# Patient Record
Sex: FEMALE | Race: BLACK OR AFRICAN AMERICAN | NOT HISPANIC OR LATINO | Employment: UNEMPLOYED | URBAN - METROPOLITAN AREA
[De-identification: names, ages, dates, MRNs, and addresses within clinical notes are randomized per-mention and may not be internally consistent; named-entity substitution may affect disease eponyms.]

---

## 2017-02-09 ENCOUNTER — HISTORICAL (OUTPATIENT)
Dept: PHYSICAL THERAPY | Facility: HOSPITAL | Age: 57
End: 2017-02-09

## 2017-02-23 ENCOUNTER — HISTORICAL (OUTPATIENT)
Dept: PHYSICAL THERAPY | Facility: HOSPITAL | Age: 57
End: 2017-02-23

## 2017-02-27 ENCOUNTER — HISTORICAL (OUTPATIENT)
Dept: PHYSICAL THERAPY | Facility: HOSPITAL | Age: 57
End: 2017-02-27

## 2017-03-02 ENCOUNTER — HISTORICAL (OUTPATIENT)
Dept: PHYSICAL THERAPY | Facility: HOSPITAL | Age: 57
End: 2017-03-02

## 2017-03-06 ENCOUNTER — HISTORICAL (OUTPATIENT)
Dept: PHYSICAL THERAPY | Facility: HOSPITAL | Age: 57
End: 2017-03-06

## 2017-03-08 ENCOUNTER — HISTORICAL (OUTPATIENT)
Dept: PHYSICAL THERAPY | Facility: HOSPITAL | Age: 57
End: 2017-03-08

## 2017-09-19 ENCOUNTER — HISTORICAL (OUTPATIENT)
Dept: WOUND CARE | Facility: HOSPITAL | Age: 57
End: 2017-09-19

## 2020-02-21 ENCOUNTER — HISTORICAL (OUTPATIENT)
Dept: RADIOLOGY | Facility: HOSPITAL | Age: 60
End: 2020-02-21

## 2020-09-11 ENCOUNTER — HOSPITAL ENCOUNTER (INPATIENT)
Facility: HOSPITAL | Age: 60
LOS: 13 days | Discharge: NURSING, REHAB OR LONG-TERM CARE | DRG: 100 | End: 2020-09-24
Attending: EMERGENCY MEDICINE | Admitting: HOSPITALIST
Payer: MEDICAID

## 2020-09-11 DIAGNOSIS — E87.6 HYPOKALEMIA: ICD-10-CM

## 2020-09-11 DIAGNOSIS — F33.3 SEVERE EPISODE OF RECURRENT MAJOR DEPRESSIVE DISORDER, WITH PSYCHOTIC FEATURES: ICD-10-CM

## 2020-09-11 DIAGNOSIS — I63.00 CEREBROVASCULAR ACCIDENT (CVA) DUE TO THROMBOSIS OF PRECEREBRAL ARTERY: ICD-10-CM

## 2020-09-11 DIAGNOSIS — R29.90 NEW ONSET SEIZURE WITH ABNORMAL NEUROLOGICAL EXAM WITHOUT HEAD TRAUMA: Primary | ICD-10-CM

## 2020-09-11 DIAGNOSIS — R90.89 ABNORMAL CT OF BRAIN: ICD-10-CM

## 2020-09-11 DIAGNOSIS — M50.90 CERVICAL DISC DISEASE: ICD-10-CM

## 2020-09-11 DIAGNOSIS — Z74.09 IMPAIRED MOBILITY AND ADLS: ICD-10-CM

## 2020-09-11 DIAGNOSIS — Z78.9 IMPAIRED MOBILITY AND ADLS: ICD-10-CM

## 2020-09-11 DIAGNOSIS — R44.3 HALLUCINATIONS: ICD-10-CM

## 2020-09-11 DIAGNOSIS — G40.901 STATUS EPILEPTICUS: ICD-10-CM

## 2020-09-11 DIAGNOSIS — R56.9 NEW ONSET SEIZURE: ICD-10-CM

## 2020-09-11 DIAGNOSIS — F32.89 OTHER DEPRESSION: ICD-10-CM

## 2020-09-11 DIAGNOSIS — M54.31 BILATERAL SCIATICA: ICD-10-CM

## 2020-09-11 DIAGNOSIS — M54.32 BILATERAL SCIATICA: ICD-10-CM

## 2020-09-11 DIAGNOSIS — G04.90 ENCEPHALITIS: ICD-10-CM

## 2020-09-11 DIAGNOSIS — T42.4X5A: ICD-10-CM

## 2020-09-11 DIAGNOSIS — R41.82 ACUTE ALTERATION IN MENTAL STATUS: ICD-10-CM

## 2020-09-11 DIAGNOSIS — G40.909 SEIZURE DISORDER: ICD-10-CM

## 2020-09-11 DIAGNOSIS — I63.9 STROKE: ICD-10-CM

## 2020-09-11 DIAGNOSIS — Z01.89 ENCOUNTER FOR IMAGING TO SCREEN FOR METAL PRIOR TO MRI: ICD-10-CM

## 2020-09-11 DIAGNOSIS — R56.9 NEW ONSET SEIZURE WITH ABNORMAL NEUROLOGICAL EXAM WITHOUT HEAD TRAUMA: Primary | ICD-10-CM

## 2020-09-11 LAB
ALBUMIN SERPL BCP-MCNC: 3.8 G/DL (ref 3.5–5.2)
ALP SERPL-CCNC: 88 U/L (ref 55–135)
ALT SERPL W/O P-5'-P-CCNC: 25 U/L (ref 10–44)
AMPHET+METHAMPHET UR QL: NEGATIVE
ANION GAP SERPL CALC-SCNC: 13 MMOL/L (ref 8–16)
ANION GAP SERPL CALC-SCNC: 14 MMOL/L (ref 8–16)
AST SERPL-CCNC: 25 U/L (ref 10–40)
BARBITURATES UR QL SCN>200 NG/ML: NEGATIVE
BASOPHILS # BLD AUTO: 0.05 K/UL (ref 0–0.2)
BASOPHILS NFR BLD: 0.5 % (ref 0–1.9)
BENZODIAZ UR QL SCN>200 NG/ML: NORMAL
BILIRUB SERPL-MCNC: 0.3 MG/DL (ref 0.1–1)
BILIRUB UR QL STRIP: NEGATIVE
BUN SERPL-MCNC: 12 MG/DL (ref 6–20)
BUN SERPL-MCNC: 14 MG/DL (ref 6–20)
BZE UR QL SCN: NEGATIVE
CALCIUM SERPL-MCNC: 9 MG/DL (ref 8.7–10.5)
CALCIUM SERPL-MCNC: 9.3 MG/DL (ref 8.7–10.5)
CANNABINOIDS UR QL SCN: NEGATIVE
CHLORIDE SERPL-SCNC: 98 MMOL/L (ref 95–110)
CHLORIDE SERPL-SCNC: 98 MMOL/L (ref 95–110)
CLARITY UR: CLEAR
CO2 SERPL-SCNC: 25 MMOL/L (ref 23–29)
CO2 SERPL-SCNC: 27 MMOL/L (ref 23–29)
COLOR UR: YELLOW
CREAT SERPL-MCNC: 0.7 MG/DL (ref 0.5–1.4)
CREAT SERPL-MCNC: 0.7 MG/DL (ref 0.5–1.4)
CREAT UR-MCNC: 64.7 MG/DL (ref 15–325)
CTP QC/QA: YES
DIFFERENTIAL METHOD: ABNORMAL
EOSINOPHIL # BLD AUTO: 0 K/UL (ref 0–0.5)
EOSINOPHIL NFR BLD: 0.3 % (ref 0–8)
ERYTHROCYTE [DISTWIDTH] IN BLOOD BY AUTOMATED COUNT: 14.3 % (ref 11.5–14.5)
EST. GFR  (AFRICAN AMERICAN): >60 ML/MIN/1.73 M^2
EST. GFR  (AFRICAN AMERICAN): >60 ML/MIN/1.73 M^2
EST. GFR  (NON AFRICAN AMERICAN): >60 ML/MIN/1.73 M^2
EST. GFR  (NON AFRICAN AMERICAN): >60 ML/MIN/1.73 M^2
ETHANOL UR-MCNC: <10 MG/DL
GLUCOSE SERPL-MCNC: 105 MG/DL (ref 70–110)
GLUCOSE SERPL-MCNC: 123 MG/DL (ref 70–110)
GLUCOSE UR QL STRIP: NEGATIVE
HCT VFR BLD AUTO: 34.8 % (ref 37–48.5)
HGB BLD-MCNC: 11.6 G/DL (ref 12–16)
HGB UR QL STRIP: NEGATIVE
IMM GRANULOCYTES # BLD AUTO: 0.04 K/UL (ref 0–0.04)
IMM GRANULOCYTES NFR BLD AUTO: 0.4 % (ref 0–0.5)
KETONES UR QL STRIP: NEGATIVE
LEUKOCYTE ESTERASE UR QL STRIP: NEGATIVE
LYMPHOCYTES # BLD AUTO: 1 K/UL (ref 1–4.8)
LYMPHOCYTES NFR BLD: 9.6 % (ref 18–48)
MAGNESIUM SERPL-MCNC: 1.9 MG/DL (ref 1.6–2.6)
MCH RBC QN AUTO: 29.6 PG (ref 27–31)
MCHC RBC AUTO-ENTMCNC: 33.3 G/DL (ref 32–36)
MCV RBC AUTO: 89 FL (ref 82–98)
METHADONE UR QL SCN>300 NG/ML: NEGATIVE
MONOCYTES # BLD AUTO: 0.5 K/UL (ref 0.3–1)
MONOCYTES NFR BLD: 4.9 % (ref 4–15)
NEUTROPHILS # BLD AUTO: 8.6 K/UL (ref 1.8–7.7)
NEUTROPHILS NFR BLD: 84.3 % (ref 38–73)
NITRITE UR QL STRIP: NEGATIVE
NRBC BLD-RTO: 0 /100 WBC
OPIATES UR QL SCN: NEGATIVE
PCP UR QL SCN>25 NG/ML: NEGATIVE
PH UR STRIP: 6 [PH] (ref 5–8)
PHOSPHATE SERPL-MCNC: 2.8 MG/DL (ref 2.7–4.5)
PLATELET # BLD AUTO: 328 K/UL (ref 150–350)
PMV BLD AUTO: 9.6 FL (ref 9.2–12.9)
POCT GLUCOSE: 127 MG/DL (ref 70–110)
POTASSIUM SERPL-SCNC: 2.9 MMOL/L (ref 3.5–5.1)
POTASSIUM SERPL-SCNC: 3.1 MMOL/L (ref 3.5–5.1)
PROT SERPL-MCNC: 6.7 G/DL (ref 6–8.4)
PROT UR QL STRIP: NEGATIVE
RBC # BLD AUTO: 3.92 M/UL (ref 4–5.4)
SARS-COV-2 RDRP RESP QL NAA+PROBE: NEGATIVE
SODIUM SERPL-SCNC: 137 MMOL/L (ref 136–145)
SODIUM SERPL-SCNC: 138 MMOL/L (ref 136–145)
SP GR UR STRIP: 1.01 (ref 1–1.03)
TOXICOLOGY INFORMATION: NORMAL
URN SPEC COLLECT METH UR: NORMAL
UROBILINOGEN UR STRIP-ACNC: NEGATIVE EU/DL
WBC # BLD AUTO: 10.23 K/UL (ref 3.9–12.7)

## 2020-09-11 PROCEDURE — 25500020 PHARM REV CODE 255: Performed by: HOSPITALIST

## 2020-09-11 PROCEDURE — U0002 COVID-19 LAB TEST NON-CDC: HCPCS | Performed by: EMERGENCY MEDICINE

## 2020-09-11 PROCEDURE — 25000003 PHARM REV CODE 250: Performed by: NURSE PRACTITIONER

## 2020-09-11 PROCEDURE — 63600175 PHARM REV CODE 636 W HCPCS: Performed by: NURSE PRACTITIONER

## 2020-09-11 PROCEDURE — 63600175 PHARM REV CODE 636 W HCPCS: Performed by: EMERGENCY MEDICINE

## 2020-09-11 PROCEDURE — 80048 BASIC METABOLIC PNL TOTAL CA: CPT

## 2020-09-11 PROCEDURE — 99223 1ST HOSP IP/OBS HIGH 75: CPT | Mod: ,,, | Performed by: NURSE PRACTITIONER

## 2020-09-11 PROCEDURE — 12000002 HC ACUTE/MED SURGE SEMI-PRIVATE ROOM

## 2020-09-11 PROCEDURE — A9585 GADOBUTROL INJECTION: HCPCS | Performed by: HOSPITALIST

## 2020-09-11 PROCEDURE — 83735 ASSAY OF MAGNESIUM: CPT

## 2020-09-11 PROCEDURE — 80053 COMPREHEN METABOLIC PANEL: CPT

## 2020-09-11 PROCEDURE — 99223 PR INITIAL HOSPITAL CARE,LEVL III: ICD-10-PCS | Mod: ,,, | Performed by: NURSE PRACTITIONER

## 2020-09-11 PROCEDURE — 80307 DRUG TEST PRSMV CHEM ANLYZR: CPT

## 2020-09-11 PROCEDURE — 81003 URINALYSIS AUTO W/O SCOPE: CPT

## 2020-09-11 PROCEDURE — 85025 COMPLETE CBC W/AUTO DIFF WBC: CPT

## 2020-09-11 PROCEDURE — 84100 ASSAY OF PHOSPHORUS: CPT

## 2020-09-11 RX ORDER — MULTIVITAMIN
1 TABLET ORAL DAILY
Status: ON HOLD | COMMUNITY
End: 2020-09-21 | Stop reason: CLARIF

## 2020-09-11 RX ORDER — DOCUSATE SODIUM 100 MG/1
100 CAPSULE, LIQUID FILLED ORAL 2 TIMES DAILY
Status: DISCONTINUED | OUTPATIENT
Start: 2020-09-11 | End: 2020-09-15

## 2020-09-11 RX ORDER — LEVETIRACETAM 10 MG/ML
1000 INJECTION INTRAVASCULAR ONCE
Status: COMPLETED | OUTPATIENT
Start: 2020-09-11 | End: 2020-09-11

## 2020-09-11 RX ORDER — LEVETIRACETAM 100 MG/ML
1000 SOLUTION ORAL ONCE
Status: DISCONTINUED | OUTPATIENT
Start: 2020-09-11 | End: 2020-09-11

## 2020-09-11 RX ORDER — SERTRALINE HYDROCHLORIDE 25 MG/1
25 TABLET, FILM COATED ORAL DAILY
Status: ON HOLD | COMMUNITY
End: 2020-09-21 | Stop reason: CLARIF

## 2020-09-11 RX ORDER — ONDANSETRON 8 MG/1
8 TABLET, ORALLY DISINTEGRATING ORAL EVERY 8 HOURS PRN
Status: DISCONTINUED | OUTPATIENT
Start: 2020-09-11 | End: 2020-09-12

## 2020-09-11 RX ORDER — QUETIAPINE FUMARATE 25 MG/1
12.5 TABLET, FILM COATED ORAL NIGHTLY
Status: ON HOLD | COMMUNITY
End: 2020-09-21 | Stop reason: DRUGHIGH

## 2020-09-11 RX ORDER — GADOBUTROL 604.72 MG/ML
7 INJECTION INTRAVENOUS
Status: COMPLETED | OUTPATIENT
Start: 2020-09-11 | End: 2020-09-11

## 2020-09-11 RX ORDER — LEVETIRACETAM 500 MG/1
1000 TABLET ORAL ONCE
Status: DISCONTINUED | OUTPATIENT
Start: 2020-09-11 | End: 2020-09-11

## 2020-09-11 RX ORDER — DIAZEPAM 10 MG/2ML
0.1 INJECTION INTRAMUSCULAR ONCE
Status: DISCONTINUED | OUTPATIENT
Start: 2020-09-11 | End: 2020-09-11

## 2020-09-11 RX ORDER — DIAZEPAM 10 MG/2ML
0.1 INJECTION INTRAMUSCULAR EVERY 8 HOURS PRN
Status: DISCONTINUED | OUTPATIENT
Start: 2020-09-11 | End: 2020-09-11

## 2020-09-11 RX ORDER — POTASSIUM CHLORIDE 7.45 MG/ML
10 INJECTION INTRAVENOUS
Status: COMPLETED | OUTPATIENT
Start: 2020-09-11 | End: 2020-09-11

## 2020-09-11 RX ORDER — SODIUM CHLORIDE 0.9 % (FLUSH) 0.9 %
10 SYRINGE (ML) INJECTION
Status: DISCONTINUED | OUTPATIENT
Start: 2020-09-11 | End: 2020-09-24 | Stop reason: HOSPADM

## 2020-09-11 RX ORDER — LEVETIRACETAM 10 MG/ML
1000 INJECTION INTRAVASCULAR EVERY 12 HOURS
Status: DISCONTINUED | OUTPATIENT
Start: 2020-09-12 | End: 2020-09-12

## 2020-09-11 RX ORDER — SERTRALINE HYDROCHLORIDE 25 MG/1
25 TABLET, FILM COATED ORAL DAILY
Status: DISCONTINUED | OUTPATIENT
Start: 2020-09-12 | End: 2020-09-15

## 2020-09-11 RX ORDER — LEVETIRACETAM 100 MG/ML
1000 SOLUTION ORAL 2 TIMES DAILY
Status: DISCONTINUED | OUTPATIENT
Start: 2020-09-11 | End: 2020-09-11

## 2020-09-11 RX ORDER — ONDANSETRON 2 MG/ML
4 INJECTION INTRAMUSCULAR; INTRAVENOUS EVERY 8 HOURS PRN
Status: DISCONTINUED | OUTPATIENT
Start: 2020-09-11 | End: 2020-09-11

## 2020-09-11 RX ORDER — GABAPENTIN 100 MG/1
100 CAPSULE ORAL 3 TIMES DAILY
Status: ON HOLD | COMMUNITY
End: 2020-09-21 | Stop reason: CLARIF

## 2020-09-11 RX ORDER — ACETAMINOPHEN 325 MG/1
650 TABLET ORAL EVERY 4 HOURS PRN
Status: DISCONTINUED | OUTPATIENT
Start: 2020-09-11 | End: 2020-09-16

## 2020-09-11 RX ORDER — SODIUM CHLORIDE 0.9 % (FLUSH) 0.9 %
10 SYRINGE (ML) INJECTION
Status: DISCONTINUED | OUTPATIENT
Start: 2020-09-11 | End: 2020-09-11

## 2020-09-11 RX ADMIN — POTASSIUM CHLORIDE 10 MEQ: 7.46 INJECTION, SOLUTION INTRAVENOUS at 01:09

## 2020-09-11 RX ADMIN — GADOBUTROL 7 ML: 604.72 INJECTION INTRAVENOUS at 03:09

## 2020-09-11 RX ADMIN — DEXTROSE 3000 MG: 50 INJECTION, SOLUTION INTRAVENOUS at 07:09

## 2020-09-11 RX ADMIN — LEVETIRACETAM INJECTION 1000 MG: 10 INJECTION INTRAVENOUS at 06:09

## 2020-09-11 NOTE — ED NOTES
Pt sleeping in bed, calm and cooperative but easily arousble. Pt has 2L NC, even/nonlabored breathing noted. Pt remains in paper scrubs, on continued cardiac monitor, BP and pulse ox. Cecile taylor at bedside with direct observation. Seizure precautions maintained. Will continue to monitor pt.

## 2020-09-11 NOTE — ED NOTES
Report received from REYNALDO Bahena.  Pt resting calmly in bed.  NAD noted.  Respirations even and unlabored.  Pt in facility provided scrubs.  Bear Huber, in direct observation of patient documenting q 15 minute checks.  Pt opens eyes to voice but does not verbally respond.

## 2020-09-11 NOTE — CONSULTS
NEUROLOGY CONSULT NOTE  [facility]    Patient Name:  Donna Arias  Date of Consult: 2020  Admit Date:    MR #:  [id number]  Acct #:  [id number]  :  1960    Physicians:  Primary Doctor No (Family); Kerry Tamayo MD  Consulting Physician:  Patria Evans MD       Chief Complaint/Reason for Consult: Seizure like activity      Assessment and Plan:  Donna Arias is a 60 y.o. w/ a h/o anxiety and depression ( unable to confrim pst history) presents from [facility] for evaluation of recurrent seizure like activity which hs persisted since she arrived. The patient was treated with 2 mg of ativan and 10 mg of Versed. In the ER she has had continued seizure activity lasting between 10 seconds to 2 minutes happening at every 5 minute or less interval.     Head CT reveals hypoattenuation in the right frontal lobe, brain MRI with changes involving the right frontal lobe increased signal , cortical banding which can be seen with seizure activity.     On examination the patient is alert but has intermittent head versive seizures with head turn to the left, left eye deviation, increased tone involving right upper extremity, inability to speak as well.     Assessment:  1. Status epilepticus  2. Right frontal lobe increased signal- possibly 2/2 seizure activity ( stroke less likely)    Recommendations:  1. Load with Keppra  4 grams iv x 1 ( 50 mg /kg) then 1 gram iv bid  2. If no benefit would load with Vimpat 10 mg/kg -  800mg iv x1   3. Stat cEEG  4. Obtain urine analaysis, urine tox screen  5. Monitor electrolytes, maintain magnesium >2     History of Present Illness:  Donna Arias is a 60 y.o.female on whom I have been asked to consult for evaluation and treatment of recurrent seizures.     I was unable to reach the patient's brother. I spoke with the Nurse taking care of her at [facility]. Per Nurse, she received report this morning that the patient was disoriented  "overnight, she was admitted on 9/9 for evaluation of an episode of confusion. She was apparently found in the neighbour's yard in a confused state and was admitted for further evaluation of the same.    Per Nurse, when she went in this am the patient was having recurrent 1 minute episodes of eyes rolling in the back of her head, bilateral upper extremity shaking followed by stiffness involving upper extremities. She would then "come out" of an episode. She would be able to answer questions like her name but not anything other than that. This continued until EMS arrived. She was given a one time dose of Ativan at the hospital without benefit.    EMS administered ativan and 10 mg versed. Per ER physician notes the patient was drowsy and unable to answer questions on arrival. Per patient's ER nurse she has episodes lasting few seconds every 5-20 minutes. The episodes are described as left head turn, left eye deviation , increased tone of the right hand followed by shaking of the right hand. The patient is able to talk 10-20 seconds after the seizure but is often confused.         Duration: 10-60 seconds  Location: left head deviation, eye deviation to the left  Intensity: moderate to severe  Aggravating factors: none  Relieving factors: none  Frequency: very frequent seizures   Timing: since she returned from MRI  Associated symptoms: confusion        Laboratory and Additional Data Reviewed:      Tests to date: All imaging reviewed personally by me in addition to cardiology reports.  MRI Brain W WO Contrast   Final Result      Restricted diffusion and edema throughout the right frontal lobe with sulcal effacement.  Findings concerning for acute infarction in the right CESILIA territory.  No definite underlying mass lesion or abnormal postcontrast enhancement, however, there is significant motion artifact which limits sensitivity.         Electronically signed by: Bravo Novak MD   Date:    09/11/2020   Time:    16:58 "      X-Ray Chest AP Portable   Final Result      No radiographic acute intrathoracic process seen on this single view.         Electronically signed by: Casey Stone MD   Date:    09/11/2020   Time:    13:58      X-Ray Abdomen AP 1 View (KUB)   Final Result      No acute process seen.         Electronically signed by: Adan Santos MD   Date:    09/11/2020   Time:    13:56      CT Head Without Contrast   Final Result      1. Hypoattenuation involving the superior right frontal lobe with involvement of cortex and underlying white matter.  While this may represent acute or subacute ischemia, additional differential consideration given the imaging appearance and clinical history would include neoplastic, infectious, inflammatory processes.  As such, further evaluation contrast-enhanced brain MRI would be recommended for further evaluation.   2. No acute intracranial hemorrhage or midline shift.   This critical information above was relayed by myself by telephone to Dr. Hughes on 09/11/2020 at 09:50.         Electronically signed by: Jose Carlos Penn   Date:    09/11/2020   Time:    09:55      MRI BRAIN EPILEPSY WITHOUT CONTRAST    (Results Pending)       Labs: All labs reviewed by me.  Lab Results   Component Value Date    WBC 10.23 09/11/2020    HGB 11.6 (L) 09/11/2020    HCT 34.8 (L) 09/11/2020     09/11/2020    ALT 25 09/11/2020    AST 25 09/11/2020     09/11/2020    K 3.1 (L) 09/11/2020    CL 98 09/11/2020    CREATININE 0.7 09/11/2020    BUN 12 09/11/2020         History:   No past medical history on file.  No past surgical history on file.  No family history on file.  Social History     Socioeconomic History    Marital status: Single     Spouse name: Not on file    Number of children: Not on file    Years of education: Not on file    Highest education level: Not on file   Occupational History    Not on file   Social Needs    Financial resource strain: Not on file    Food insecurity     Worry:  Not on file     Inability: Not on file    Transportation needs     Medical: Not on file     Non-medical: Not on file   Tobacco Use    Smoking status: Not on file   Substance and Sexual Activity    Alcohol use: Not on file    Drug use: Not on file    Sexual activity: Not on file   Lifestyle    Physical activity     Days per week: Not on file     Minutes per session: Not on file    Stress: Not on file   Relationships    Social connections     Talks on phone: Not on file     Gets together: Not on file     Attends Sikhism service: Not on file     Active member of club or organization: Not on file     Attends meetings of clubs or organizations: Not on file     Relationship status: Not on file   Other Topics Concern    Not on file   Social History Narrative    Not on file       Allergy Information:        I have reviewed the patient's allergies.       Benadryl [diphenhydramine hcl] and Prednisone    Home Medications:   No current facility-administered medications on file prior to encounter.      No current outpatient medications on file prior to encounter.       Hospital Medications Reviewed in EPIC   docusate sodium  100 mg Oral BID    levetiracetam IVPB  1,000 mg Intravenous Once       Review of Systems:    Review of Systems   Unable to perform ROS: Acuity of condition       Physical Examination:  Vital signs in last 24 hours:  Temp:  [98.6 °F (37 °C)] 98.6 °F (37 °C)  Pulse:  [] 98  Resp:  [18-25] 25  SpO2:  [96 %-100 %] 99 %  BP: (118-177)/(59-87) 153/74      GENERAL:  General appearance: Well, non-toxic appearing.  No apparent distress.    Extremities: normal.    MENTAL STATUS:  Alertness, attention span & concentration: patient has several seizures throughout exam lasting 10 -20 seconds, is able to speak and follow commands when not seizing. Ativan ordered  Language: normal.  Orientation to self, place & time: when not having seizure like activity able to state name, Presidents name and month,  unable to speak during seizure like events   Memory, recent & remote: 3/3 immediate recall , 0/3 recent recall    SPEECH:  Clear and fluent when she speaks intermittently     CRANIAL NERVES:  Cranial Nerves II-XII were examined.  II - Visual fields: normal.  III, IV, VI: PERRL, EOMI, No ptosis, No nystagmus.  V - Facial sensation: corneal reflex intact   VII - Face symmetry & mobility: Left facial droop   VIII - Hearing: normal when able to follow commands   IX, X - Palate: does not cooperate with testing   XI - Shoulder shrug: does not cooperate with testing   XII - Tongue protrusion: unable to assess, gives name     GROSS MOTOR:  Gait & station: drowsy, is having recurrent seizures   Tone: normal.  Abnormal movements: none.  Finger-nose & Heel-knee-shin: does not cooperate with testing     MUSCLE STRENGTH:   Patient follows commands intermittently, moves right upper and lower extremities antigravity. No movement of left lower extremity noted, minimally moves fingers on left       REFLEXES:    RIGHT Reflex   LEFT   2+ Biceps 2+   2+ Brachiorad. 2+   2+ Triceps 2+        2+ Patellar 2+   2+ Ankle 2+        Down PLANTAR mute     SENSORY:  Withdraws right upper and lower extremities to painful stimuli , does not withdraw left upper and lower extremities to painful stimuli     40 minutes spent face-to-face, >50% spent advising about: counseling and/or coordination of care           Patria Evans M.D    Medicine-Neurology, Clinical Neurophysiology    This note was created with voice recognition software.  Grammatical, syntax and spelling errors may be inevitable.

## 2020-09-11 NOTE — ED NOTES
Was told to HOLD Diazepam administration due to MRI results per MD arellano. Will continue to monitor.

## 2020-09-11 NOTE — ED NOTES
Pt sleeping in bed, calm and cooperative but easily arousble. Pt has 2L NC, even/nonlabored breathing noted. Pt on continued cardiac monitor, BP and pulse ox. Sitter juan daniel at bedside with direct observation. Seizure precautions maintained. Will continue to monitor pt.

## 2020-09-11 NOTE — ED NOTES
Noted minimal seizure like activity with a blank stair and light shaking that lasted a couple of seconds and then pt snaps out of it and starts to follow commands and answer questions being asked. MD notified. Will continue to monitor.

## 2020-09-11 NOTE — ED PROVIDER NOTES
Encounter Date: 9/11/2020    SCRIBE #1 NOTE: Tyrell PACHECO am scribing for, and in the presence of, Dr. Hughes.       History     Chief Complaint   Patient presents with    Seizures     Focal seizure this am per [facility]. Pt is currently PEC'D for hallucinations. No hx of seizures     Time seen by provider: 9:17 AM    This is a 60 y.o. female who presents via EMS due to new onset seizures. Per EMS, pt is under a PEC at [facility] for hallucinations. Pt has no past history of seizures. A second seizure was witnessed by EMS when they arrived on scene - they treated her with 10 mg Versed and 2 mg of Ativan.    At this time the patient is obtunded and unable to provide any history.    The history is provided by the EMS personnel. The history is limited by the condition of the patient.     Review of patient's allergies indicates:   Allergen Reactions    Benadryl [diphenhydramine hcl]     Prednisone      No past medical history on file.  No past surgical history on file.  No family history on file.  Social History     Tobacco Use    Smoking status: Not on file   Substance Use Topics    Alcohol use: Not on file    Drug use: Not on file     Review of Systems   Unable to perform ROS: Mental status change       Physical Exam     Initial Vitals [09/11/20 0850]   BP Pulse Resp Temp SpO2   128/69 (!) 113 18 98.6 °F (37 °C) 96 %      MAP       --         Physical Exam    Nursing note and vitals reviewed.  Constitutional: She appears well-developed and well-nourished. She appears lethargic. She is not diaphoretic. No distress.   HENT:   Head: Normocephalic and atraumatic.   Eyes: Conjunctivae and EOM are normal. Pupils are equal, round, and reactive to light. No scleral icterus.   Neck: Normal range of motion. Neck supple.   Cardiovascular: Regular rhythm and normal heart sounds. Exam reveals no gallop and no friction rub.    No murmur heard.  Tachycardia.   Pulmonary/Chest:  Breath sounds normal. No respiratory distress. She has no wheezes. She has no rhonchi. She has no rales.   Abdominal: Soft. Bowel sounds are normal. She exhibits no distension. There is no abdominal tenderness. There is no rebound and no guarding.   Musculoskeletal: Normal range of motion. No tenderness or edema.   Neurological: She appears lethargic. She displays no atrophy. She displays no seizure activity. GCS eye subscore is 1. GCS verbal subscore is 3. GCS motor subscore is 4.   Moves all extremities.  Uncooperative with neurological assessment.   Skin: Skin is warm and dry.         ED Course   Procedures  Labs Reviewed   CBC W/ AUTO DIFFERENTIAL - Abnormal; Notable for the following components:       Result Value    RBC 3.92 (*)     Hemoglobin 11.6 (*)     Hematocrit 34.8 (*)     Gran # (ANC) 8.6 (*)     Gran% 84.3 (*)     Lymph% 9.6 (*)     All other components within normal limits   COMPREHENSIVE METABOLIC PANEL - Abnormal; Notable for the following components:    Potassium 2.9 (*)     Glucose 123 (*)     All other components within normal limits   POCT GLUCOSE - Abnormal; Notable for the following components:    POCT Glucose 127 (*)     All other components within normal limits   MAGNESIUM   PHOSPHORUS   URINALYSIS, REFLEX TO URINE CULTURE   POCT GLUCOSE MONITORING CONTINUOUS          Imaging Results          X-Ray Chest AP Portable (Final result)  Result time 09/11/20 13:58:07    Final result by Casey Stone MD (09/11/20 13:58:07)                 Impression:      No radiographic acute intrathoracic process seen on this single view.      Electronically signed by: Casey Stone MD  Date:    09/11/2020  Time:    13:58             Narrative:    EXAMINATION:  XR CHEST AP PORTABLE    TECHNIQUE:  Single frontal view of the chest was performed.    COMPARISON:  None    FINDINGS:  Monitoring leads overlie the chest.  Patient is rotated.  Resolution is limited by body habitus with  underpenetration.    Cardiomediastinal silhouette is midline and within normal limits for age.  The lungs are well expanded without large consolidation, pleural effusion or pneumothorax noting slight nonspecific elevation of the right hemidiaphragm.  Pulmonary vasculature and hilar contours are within normal limits.  No acute osseous process seen.  PA and lateral views can be obtained.                               X-Ray Abdomen AP 1 View (KUB) (Final result)  Result time 09/11/20 13:56:13    Final result by Adan Santos III, MD (09/11/20 13:56:13)                 Impression:      No acute process seen.      Electronically signed by: Adan Santos MD  Date:    09/11/2020  Time:    13:56             Narrative:    EXAMINATION:  XR ABDOMEN AP 1 VIEW    FINDINGS:  No obstruction, ileus, or perforation seen.  No trauma seen.                               CT Head Without Contrast (Final result)  Result time 09/11/20 09:55:45    Final result by Jose Carlos Penn MD (09/11/20 09:55:45)                 Impression:      1. Hypoattenuation involving the superior right frontal lobe with involvement of cortex and underlying white matter.  While this may represent acute or subacute ischemia, additional differential consideration given the imaging appearance and clinical history would include neoplastic, infectious, inflammatory processes.  As such, further evaluation contrast-enhanced brain MRI would be recommended for further evaluation.  2. No acute intracranial hemorrhage or midline shift.  This critical information above was relayed by myself by telephone to Dr. Hughes on 09/11/2020 at 09:50.      Electronically signed by: Jose Carlos Penn  Date:    09/11/2020  Time:    09:55             Narrative:    EXAMINATION:  CT HEAD WITHOUT CONTRAST    CLINICAL HISTORY:  Seizure, nontraumatic (Age => 41y);    TECHNIQUE:  Low dose axial images were obtained through the head.  Coronal and sagittal reformations were also performed.  Contrast was not administered.    COMPARISON:  None.    FINDINGS:  There is hypoattenuation involving superomedial right frontal lobe.  There is some blurring of gray-white differentiation.  Hypoattenuation involve the underlying white matter.    There is no evidence of acute intracranial hemorrhage, mass effect, or midline shift.  Generalized age-related parenchymal volume loss is noted.  No evidence of hydrocephalus is seen.  No extra-axial fluid collection is appreciated.  Intracranial atherosclerosis.  No aggressive appearing osseous lesion.  Relatively minor paranasal sinus mucosal thickening is noted.  Leftward gaze deviation is appreciated.  Extracranial soft tissue structures unremarkable.                                 Medical Decision Making:   History:   Old Medical Records: I decided to obtain old medical records.  Clinical Tests:   Lab Tests: Ordered and Reviewed  Radiological Study: Ordered and Reviewed  ED Management:  Emergent evaluation of 60-year-old female who presents with complaint of new onset seizure.  Vital signs reveal tachycardia, afebrile.  Physical examination is limited, patient did receive a large amount of benzodiazepines to treat seizure activity prior to arrival and it now has altered mental status which I suspect is related to these drugs.  She does move all extremities.  Lab workup shows hypokalemia.  CT of the head shows abnormality in the right frontal lobe.  I did discuss with Stroke Neurology since last no normal 2 hr ago and possible ischemia - he agrees not a candidate for tPA.  MRI is ordered.  Patient is unable to answer MRI screening questions and chest and abdominal x-ray are ordered.  She is admitted to the hospitalist service for MRI and further care.  During reassessment at end of ER course, patient was able to state her name but still lethargic and unable to answer most questions.            Scribe Attestation:   Scribe #1: I performed the above scribed service and  the documentation accurately describes the services I performed. I attest to the accuracy of the note.    Attending Attestation:           Physician Attestation for Scribe:  Physician Attestation Statement for Scribe #1: I, Dr. Hughes, reviewed documentation, as scribed by Tyrell Pack in my presence, and it is both accurate and complete.                 ED Course as of Sep 11 1417   Fri Sep 11, 2020   0956 Discussed case with radiology. Paged vascular neurology.    [DM]   1003 I discussed the case with Dr. Leyva with vascular Neurology.  He agrees that patient is not currently a tPA candidate and recommends MRI as ordered.    [AK]   1307 I paged the hospitalist to discuss admission.    [AK]      ED Course User Index  [AK] Ingrid Hughes MD  [DM] Cheri Pack            Clinical Impression:     1. New onset seizure with abnormal neurological exam without head trauma    2. Encounter for imaging to screen for metal prior to MRI    3. Abnormal CT of brain    4. Benzodiazepine causing adverse effect in therapeutic use, initial encounter    5. Hypokalemia    6. Acute alteration in mental status              ED Disposition Condition    Observation                             Ingrid Hughes MD  09/11/20 [id number]

## 2020-09-11 NOTE — ED NOTES
Pt awake in bed, calm but very limited words have been spoken. Pt has 2L NC, even/nonlabored breathing noted. Pt remains in paper scrubs, on continued cardiac monitor, BP and pulse ox. Cecile taylor at bedside with direct observation. Seizure precautions maintained. Pt having multiple episodes of seizure like activity. Will continue to monitor pt.

## 2020-09-11 NOTE — ED TRIAGE NOTES
Per EMS, pt was sent from [facility] for multiple focal seizures today. He states they said that she was staring off into space and not responding. She had an event while with EMS and they stated her left arm was raised and had a jerking motion. She was given Ativan 2 mg IM per community care and Versed 10 mg IM per EMS. Pt is currenty PEC'd for hallucinations.

## 2020-09-11 NOTE — ED NOTES
Pt sleeping in bed, calm and cooperative but easily arousble. Pt has 2L NC, even/nonlabored breathing noted. Pt remains in paper scrubs, on continued cardiac monitor, BP and pulse ox. Sitter juan daniel at bedside with direct observation. Seizure precautions maintained. Will continue to monitor pt.

## 2020-09-11 NOTE — ED NOTES
Pt had seizure like activity with her eyes rolling to the back of her head. Seizure like activity lasting approximately 16 seconds. Will continue to monitor. MD notified.

## 2020-09-11 NOTE — ED NOTES
Pt sleeping in bed, calm and cooperative but easily arousble. Pt has 2L NC, even/nonlabored breathing noted. Pt on continued cardiac monitor, BP and pulse ox. Sitter juan daniel at bedside with direct observation. Seizure precautions initiated. Will continue to monitor pt.

## 2020-09-11 NOTE — HPI
Donna Arias is a 60 year old female with medical history of The patient has medical history of depression with anxiety, sciatica, and cervical disc disease. She presented to the [facility] via EMS with report of continuous seizures that were first noted this morning at approximately 0815 this morning.  Patient unable to provide history and history gotten from [facility].  Per medical record, the patient was admitted to [facility] as PEC from  [facility] ([place], [place]) after being found with hallucinations and altered mental status.  Report states that patient sustained fall approximately September 3rd, but no other information was available regarding possible head injury.  Per [facility] ED report, EMS treated patient en route with versed and ativan.  Initial imaging in ED with CT head with hypoattenuation involving the superior right frontal lobe with involvement of cortex and underlying white matter, but no evidence of hemorrhage.  MRI with changes involving the right frontal lobe increased signal without ACD correlation. Patient was admitted to Hospital Medicine for further management and evaluation of seizure activity.  Potassium 2.9 and was treated with IV potassium chloride 10 mEp for one dose.       After admission, General Neurology was consulted and the patient was loaded with Keppra 50 mg/kg (4gm).  Urine toxicology pending.  Case discussed with Neurocritical Care and agree with transfer to [facility] for continuous EEG monitoring in Neuro ICU.

## 2020-09-11 NOTE — ED NOTES
Patient moved to ED room 4 via EMS, patient assisted onto stretcher. Patient placed on cardiac monitor, continuous pulse oximetry and automatic blood pressure cuff. Bed placed in low locked position, side rails up x 2, call light is within reach of patient or family, orientation to room and explanation of wait provided to family and patient, alarms set and turned on for monitor and pulse ox, awaiting MD evaluation and orders, will continue to monitor.Bear Cary at bedside to monitor patient with direct visual 1:1 contact.

## 2020-09-11 NOTE — ED NOTES
Pt awake in bed, calm and cooperative but very limited words have been spoken. Pt has 2L NC, even/nonlabored breathing noted. Pt remains in paper scrubs, on continued cardiac monitor, BP and pulse ox. Jordonter claudia at bedside with direct observation. Seizure precautions maintained. Will continue to monitor pt.

## 2020-09-11 NOTE — ED NOTES
Pt had seizure like activity with light shaking and her eyes rolling to the back of her head. Seizure like activity lasting approximately 22 seconds. Will continue to monitor. MD notified.

## 2020-09-12 PROBLEM — R44.3 HALLUCINATIONS: Status: ACTIVE | Noted: 2020-09-12

## 2020-09-12 PROBLEM — I63.521 ACUTE RIGHT ACA STROKE: Status: ACTIVE | Noted: 2020-09-12

## 2020-09-12 LAB
ALBUMIN SERPL BCP-MCNC: 3.6 G/DL (ref 3.5–5.2)
ALP SERPL-CCNC: 84 U/L (ref 55–135)
ALT SERPL W/O P-5'-P-CCNC: 26 U/L (ref 10–44)
AMPHET+METHAMPHET UR QL: NEGATIVE
ANION GAP SERPL CALC-SCNC: 10 MMOL/L (ref 8–16)
ANION GAP SERPL CALC-SCNC: 14 MMOL/L (ref 8–16)
APTT BLDCRRT: 26.2 SEC (ref 21–32)
AST SERPL-CCNC: 23 U/L (ref 10–40)
BACTERIA #/AREA URNS AUTO: ABNORMAL /HPF
BARBITURATES UR QL SCN>200 NG/ML: NEGATIVE
BASOPHILS # BLD AUTO: 0.07 K/UL (ref 0–0.2)
BASOPHILS NFR BLD: 0.7 % (ref 0–1.9)
BENZODIAZ UR QL SCN>200 NG/ML: NORMAL
BILIRUB SERPL-MCNC: 0.4 MG/DL (ref 0.1–1)
BILIRUB UR QL STRIP: NEGATIVE
BUN SERPL-MCNC: 11 MG/DL (ref 6–20)
BUN SERPL-MCNC: 13 MG/DL (ref 6–20)
BZE UR QL SCN: NEGATIVE
CALCIUM SERPL-MCNC: 8.9 MG/DL (ref 8.7–10.5)
CALCIUM SERPL-MCNC: 9.2 MG/DL (ref 8.7–10.5)
CANNABINOIDS UR QL SCN: NEGATIVE
CHLORIDE SERPL-SCNC: 98 MMOL/L (ref 95–110)
CHLORIDE SERPL-SCNC: 99 MMOL/L (ref 95–110)
CLARITY UR REFRACT.AUTO: ABNORMAL
CO2 SERPL-SCNC: 26 MMOL/L (ref 23–29)
CO2 SERPL-SCNC: 28 MMOL/L (ref 23–29)
COLOR UR AUTO: YELLOW
CREAT SERPL-MCNC: 0.6 MG/DL (ref 0.5–1.4)
CREAT SERPL-MCNC: 0.7 MG/DL (ref 0.5–1.4)
CREAT UR-MCNC: 98 MG/DL (ref 15–325)
DIFFERENTIAL METHOD: ABNORMAL
EOSINOPHIL # BLD AUTO: 0.1 K/UL (ref 0–0.5)
EOSINOPHIL NFR BLD: 0.9 % (ref 0–8)
ERYTHROCYTE [DISTWIDTH] IN BLOOD BY AUTOMATED COUNT: 13.9 % (ref 11.5–14.5)
EST. GFR  (AFRICAN AMERICAN): >60 ML/MIN/1.73 M^2
EST. GFR  (AFRICAN AMERICAN): >60 ML/MIN/1.73 M^2
EST. GFR  (NON AFRICAN AMERICAN): >60 ML/MIN/1.73 M^2
EST. GFR  (NON AFRICAN AMERICAN): >60 ML/MIN/1.73 M^2
GLUCOSE SERPL-MCNC: 107 MG/DL (ref 70–110)
GLUCOSE SERPL-MCNC: 109 MG/DL (ref 70–110)
GLUCOSE UR QL STRIP: NEGATIVE
HCT VFR BLD AUTO: 34.7 % (ref 37–48.5)
HGB BLD-MCNC: 11.2 G/DL (ref 12–16)
HGB UR QL STRIP: ABNORMAL
IMM GRANULOCYTES # BLD AUTO: 0.03 K/UL (ref 0–0.04)
IMM GRANULOCYTES NFR BLD AUTO: 0.3 % (ref 0–0.5)
INR PPP: 1 (ref 0.8–1.2)
KETONES UR QL STRIP: ABNORMAL
LEUKOCYTE ESTERASE UR QL STRIP: NEGATIVE
LYMPHOCYTES # BLD AUTO: 1.4 K/UL (ref 1–4.8)
LYMPHOCYTES NFR BLD: 14.8 % (ref 18–48)
MCH RBC QN AUTO: 28.8 PG (ref 27–31)
MCHC RBC AUTO-ENTMCNC: 32.3 G/DL (ref 32–36)
MCV RBC AUTO: 89 FL (ref 82–98)
METHADONE UR QL SCN>300 NG/ML: NEGATIVE
MICROSCOPIC COMMENT: ABNORMAL
MONOCYTES # BLD AUTO: 0.7 K/UL (ref 0.3–1)
MONOCYTES NFR BLD: 7.1 % (ref 4–15)
NEUTROPHILS # BLD AUTO: 7.3 K/UL (ref 1.8–7.7)
NEUTROPHILS NFR BLD: 76.2 % (ref 38–73)
NITRITE UR QL STRIP: NEGATIVE
NRBC BLD-RTO: 0 /100 WBC
OPIATES UR QL SCN: NEGATIVE
PCP UR QL SCN>25 NG/ML: NEGATIVE
PH UR STRIP: 6 [PH] (ref 5–8)
PLATELET # BLD AUTO: 337 K/UL (ref 150–350)
PMV BLD AUTO: 8.8 FL (ref 9.2–12.9)
POCT GLUCOSE: 102 MG/DL (ref 70–110)
POCT GLUCOSE: 136 MG/DL (ref 70–110)
POTASSIUM SERPL-SCNC: 2.8 MMOL/L (ref 3.5–5.1)
POTASSIUM SERPL-SCNC: 3.3 MMOL/L (ref 3.5–5.1)
PROT SERPL-MCNC: 6.4 G/DL (ref 6–8.4)
PROT UR QL STRIP: NEGATIVE
PROTHROMBIN TIME: 11.2 SEC (ref 9–12.5)
RBC # BLD AUTO: 3.89 M/UL (ref 4–5.4)
RBC #/AREA URNS AUTO: 13 /HPF (ref 0–4)
SODIUM SERPL-SCNC: 137 MMOL/L (ref 136–145)
SODIUM SERPL-SCNC: 138 MMOL/L (ref 136–145)
SP GR UR STRIP: 1.02 (ref 1–1.03)
SQUAMOUS #/AREA URNS AUTO: 1 /HPF
T4 FREE SERPL-MCNC: 1.32 NG/DL (ref 0.71–1.51)
THYROGLOB AB SERPL IA-ACNC: <4 IU/ML (ref 0–3.9)
THYROPEROXIDASE IGG SERPL-ACNC: <6 IU/ML
TOXICOLOGY INFORMATION: NORMAL
TSH SERPL DL<=0.005 MIU/L-ACNC: 1.1 UIU/ML (ref 0.4–4)
URN SPEC COLLECT METH UR: ABNORMAL
VIT B12 SERPL-MCNC: >2000 PG/ML (ref 210–950)
WBC # BLD AUTO: 9.62 K/UL (ref 3.9–12.7)
WBC #/AREA URNS AUTO: 3 /HPF (ref 0–5)

## 2020-09-12 PROCEDURE — 80048 BASIC METABOLIC PNL TOTAL CA: CPT

## 2020-09-12 PROCEDURE — 63600175 PHARM REV CODE 636 W HCPCS: Performed by: NURSE PRACTITIONER

## 2020-09-12 PROCEDURE — 86255 FLUORESCENT ANTIBODY SCREEN: CPT | Mod: 59

## 2020-09-12 PROCEDURE — 25000003 PHARM REV CODE 250: Performed by: NURSE PRACTITIONER

## 2020-09-12 PROCEDURE — 81001 URINALYSIS AUTO W/SCOPE: CPT

## 2020-09-12 PROCEDURE — 99233 PR SUBSEQUENT HOSPITAL CARE,LEVL III: ICD-10-PCS | Mod: ,,, | Performed by: PSYCHIATRY & NEUROLOGY

## 2020-09-12 PROCEDURE — 83519 RIA NONANTIBODY: CPT | Mod: 59

## 2020-09-12 PROCEDURE — 95816 EEG AWAKE AND DROWSY: CPT | Mod: 26,,, | Performed by: PSYCHIATRY & NEUROLOGY

## 2020-09-12 PROCEDURE — 84443 ASSAY THYROID STIM HORMONE: CPT

## 2020-09-12 PROCEDURE — 63600175 PHARM REV CODE 636 W HCPCS: Performed by: PSYCHIATRY & NEUROLOGY

## 2020-09-12 PROCEDURE — 86703 HIV-1/HIV-2 1 RESULT ANTBDY: CPT

## 2020-09-12 PROCEDURE — 86617 LYME DISEASE ANTIBODY: CPT | Mod: 59

## 2020-09-12 PROCEDURE — 99233 SBSQ HOSP IP/OBS HIGH 50: CPT | Mod: ,,, | Performed by: NURSE PRACTITIONER

## 2020-09-12 PROCEDURE — C9254 INJECTION, LACOSAMIDE: HCPCS | Performed by: NURSE PRACTITIONER

## 2020-09-12 PROCEDURE — 99233 PR SUBSEQUENT HOSPITAL CARE,LEVL III: ICD-10-PCS | Mod: ,,, | Performed by: NURSE PRACTITIONER

## 2020-09-12 PROCEDURE — 93005 ELECTROCARDIOGRAM TRACING: CPT

## 2020-09-12 PROCEDURE — 96367 TX/PROPH/DG ADDL SEQ IV INF: CPT

## 2020-09-12 PROCEDURE — 85025 COMPLETE CBC W/AUTO DIFF WBC: CPT

## 2020-09-12 PROCEDURE — 82607 VITAMIN B-12: CPT

## 2020-09-12 PROCEDURE — 84425 ASSAY OF VITAMIN B-1: CPT

## 2020-09-12 PROCEDURE — 93010 EKG 12-LEAD: ICD-10-PCS | Mod: ,,, | Performed by: INTERNAL MEDICINE

## 2020-09-12 PROCEDURE — 85730 THROMBOPLASTIN TIME PARTIAL: CPT

## 2020-09-12 PROCEDURE — 80307 DRUG TEST PRSMV CHEM ANLYZR: CPT

## 2020-09-12 PROCEDURE — 95816 PR EEG,W/AWAKE & DROWSY RECORD: ICD-10-PCS | Mod: 26,,, | Performed by: PSYCHIATRY & NEUROLOGY

## 2020-09-12 PROCEDURE — 80321 ALCOHOLS BIOMARKERS 1OR 2: CPT

## 2020-09-12 PROCEDURE — 93010 ELECTROCARDIOGRAM REPORT: CPT | Mod: ,,, | Performed by: INTERNAL MEDICINE

## 2020-09-12 PROCEDURE — 96366 THER/PROPH/DIAG IV INF ADDON: CPT

## 2020-09-12 PROCEDURE — 84439 ASSAY OF FREE THYROXINE: CPT

## 2020-09-12 PROCEDURE — 86800 THYROGLOBULIN ANTIBODY: CPT

## 2020-09-12 PROCEDURE — 99285 PR EMERGENCY DEPT VISIT,LEVEL V: ICD-10-PCS | Mod: ,,, | Performed by: NURSE PRACTITIONER

## 2020-09-12 PROCEDURE — 63600175 PHARM REV CODE 636 W HCPCS: Performed by: STUDENT IN AN ORGANIZED HEALTH CARE EDUCATION/TRAINING PROGRAM

## 2020-09-12 PROCEDURE — 99233 SBSQ HOSP IP/OBS HIGH 50: CPT | Mod: ,,, | Performed by: PSYCHIATRY & NEUROLOGY

## 2020-09-12 PROCEDURE — 99285 EMERGENCY DEPT VISIT HI MDM: CPT | Mod: 25

## 2020-09-12 PROCEDURE — A4216 STERILE WATER/SALINE, 10 ML: HCPCS | Performed by: NURSE PRACTITIONER

## 2020-09-12 PROCEDURE — 86592 SYPHILIS TEST NON-TREP QUAL: CPT

## 2020-09-12 PROCEDURE — 96365 THER/PROPH/DIAG IV INF INIT: CPT

## 2020-09-12 PROCEDURE — 85610 PROTHROMBIN TIME: CPT

## 2020-09-12 PROCEDURE — 12000002 HC ACUTE/MED SURGE SEMI-PRIVATE ROOM

## 2020-09-12 PROCEDURE — 80053 COMPREHEN METABOLIC PANEL: CPT

## 2020-09-12 PROCEDURE — 82962 GLUCOSE BLOOD TEST: CPT

## 2020-09-12 PROCEDURE — 99285 EMERGENCY DEPT VISIT HI MDM: CPT | Mod: ,,, | Performed by: NURSE PRACTITIONER

## 2020-09-12 PROCEDURE — P9612 CATHETERIZE FOR URINE SPEC: HCPCS

## 2020-09-12 PROCEDURE — 25000003 PHARM REV CODE 250

## 2020-09-12 PROCEDURE — 36415 COLL VENOUS BLD VENIPUNCTURE: CPT

## 2020-09-12 PROCEDURE — 86376 MICROSOMAL ANTIBODY EACH: CPT

## 2020-09-12 RX ORDER — LEVETIRACETAM 10 MG/ML
1000 INJECTION INTRAVASCULAR EVERY 12 HOURS
Status: DISCONTINUED | OUTPATIENT
Start: 2020-09-12 | End: 2020-09-12

## 2020-09-12 RX ORDER — SODIUM,POTASSIUM PHOSPHATES 280-250MG
2 POWDER IN PACKET (EA) ORAL
Status: DISCONTINUED | OUTPATIENT
Start: 2020-09-12 | End: 2020-09-16

## 2020-09-12 RX ORDER — ASPIRIN 81 MG/1
81 TABLET ORAL DAILY
Status: DISCONTINUED | OUTPATIENT
Start: 2020-09-12 | End: 2020-09-15

## 2020-09-12 RX ORDER — LANOLIN ALCOHOL/MO/W.PET/CERES
800 CREAM (GRAM) TOPICAL
Status: DISCONTINUED | OUTPATIENT
Start: 2020-09-12 | End: 2020-09-16

## 2020-09-12 RX ORDER — LIDOCAINE HYDROCHLORIDE 10 MG/ML
INJECTION INFILTRATION; PERINEURAL
Status: COMPLETED
Start: 2020-09-12 | End: 2020-09-12

## 2020-09-12 RX ORDER — POTASSIUM CHLORIDE 1.5 G/1.58G
40 POWDER, FOR SOLUTION ORAL
Status: DISCONTINUED | OUTPATIENT
Start: 2020-09-12 | End: 2020-09-16

## 2020-09-12 RX ORDER — LIDOCAINE HYDROCHLORIDE 10 MG/ML
10 INJECTION INFILTRATION; PERINEURAL ONCE
Status: COMPLETED | OUTPATIENT
Start: 2020-09-12 | End: 2020-09-12

## 2020-09-12 RX ORDER — LABETALOL HCL 20 MG/4 ML
10 SYRINGE (ML) INTRAVENOUS EVERY 4 HOURS PRN
Status: DISCONTINUED | OUTPATIENT
Start: 2020-09-12 | End: 2020-09-24 | Stop reason: HOSPADM

## 2020-09-12 RX ORDER — LEVETIRACETAM 10 MG/ML
1000 INJECTION INTRAVASCULAR EVERY 12 HOURS
Status: DISCONTINUED | OUTPATIENT
Start: 2020-09-12 | End: 2020-09-14

## 2020-09-12 RX ORDER — HEPARIN SODIUM 5000 [USP'U]/ML
5000 INJECTION, SOLUTION INTRAVENOUS; SUBCUTANEOUS EVERY 8 HOURS
Status: DISCONTINUED | OUTPATIENT
Start: 2020-09-12 | End: 2020-09-18

## 2020-09-12 RX ORDER — SODIUM CHLORIDE 0.9 % (FLUSH) 0.9 %
3 SYRINGE (ML) INJECTION EVERY 8 HOURS
Status: DISCONTINUED | OUTPATIENT
Start: 2020-09-12 | End: 2020-09-24 | Stop reason: HOSPADM

## 2020-09-12 RX ORDER — POTASSIUM CHLORIDE 1.5 G/1.58G
60 POWDER, FOR SOLUTION ORAL
Status: DISCONTINUED | OUTPATIENT
Start: 2020-09-12 | End: 2020-09-16

## 2020-09-12 RX ORDER — CLOBAZAM 10 MG/1
10 TABLET ORAL DAILY
Status: DISCONTINUED | OUTPATIENT
Start: 2020-09-12 | End: 2020-09-14

## 2020-09-12 RX ORDER — ENOXAPARIN SODIUM 100 MG/ML
40 INJECTION SUBCUTANEOUS EVERY 24 HOURS
Status: DISCONTINUED | OUTPATIENT
Start: 2020-09-12 | End: 2020-09-12

## 2020-09-12 RX ORDER — POTASSIUM CHLORIDE 7.45 MG/ML
10 INJECTION INTRAVENOUS
Status: COMPLETED | OUTPATIENT
Start: 2020-09-12 | End: 2020-09-12

## 2020-09-12 RX ORDER — ONDANSETRON 2 MG/ML
4 INJECTION INTRAMUSCULAR; INTRAVENOUS EVERY 6 HOURS PRN
Status: DISCONTINUED | OUTPATIENT
Start: 2020-09-12 | End: 2020-09-24 | Stop reason: HOSPADM

## 2020-09-12 RX ORDER — ACETAMINOPHEN 650 MG/1
650 SUPPOSITORY RECTAL EVERY 6 HOURS PRN
Status: DISCONTINUED | OUTPATIENT
Start: 2020-09-12 | End: 2020-09-18

## 2020-09-12 RX ORDER — ATORVASTATIN CALCIUM 20 MG/1
40 TABLET, FILM COATED ORAL DAILY
Status: DISCONTINUED | OUTPATIENT
Start: 2020-09-12 | End: 2020-09-15

## 2020-09-12 RX ORDER — SODIUM CHLORIDE 9 MG/ML
INJECTION, SOLUTION INTRAVENOUS CONTINUOUS
Status: DISCONTINUED | OUTPATIENT
Start: 2020-09-12 | End: 2020-09-19

## 2020-09-12 RX ADMIN — SODIUM CHLORIDE 400 MG: 9 INJECTION, SOLUTION INTRAVENOUS at 10:09

## 2020-09-12 RX ADMIN — DOCUSATE SODIUM 100 MG: 100 CAPSULE, LIQUID FILLED ORAL at 08:09

## 2020-09-12 RX ADMIN — POTASSIUM CHLORIDE 10 MEQ: 7.46 INJECTION, SOLUTION INTRAVENOUS at 12:09

## 2020-09-12 RX ADMIN — ENOXAPARIN SODIUM 40 MG: 40 INJECTION SUBCUTANEOUS at 06:09

## 2020-09-12 RX ADMIN — ATORVASTATIN CALCIUM 40 MG: 20 TABLET, FILM COATED ORAL at 08:09

## 2020-09-12 RX ADMIN — THERA TABS 1 TABLET: TAB at 10:09

## 2020-09-12 RX ADMIN — LEVETIRACETAM INJECTION 1000 MG: 10 INJECTION INTRAVENOUS at 08:09

## 2020-09-12 RX ADMIN — LIDOCAINE HYDROCHLORIDE 10 MG: 10 INJECTION, SOLUTION INFILTRATION; PERINEURAL at 01:09

## 2020-09-12 RX ADMIN — SODIUM CHLORIDE: 0.9 INJECTION, SOLUTION INTRAVENOUS at 08:09

## 2020-09-12 RX ADMIN — ASPIRIN 81 MG: 81 TABLET, COATED ORAL at 08:09

## 2020-09-12 RX ADMIN — POTASSIUM CHLORIDE 10 MEQ: 7.46 INJECTION, SOLUTION INTRAVENOUS at 10:09

## 2020-09-12 RX ADMIN — HEPARIN SODIUM 5000 UNITS: 5000 INJECTION INTRAVENOUS; SUBCUTANEOUS at 08:09

## 2020-09-12 RX ADMIN — Medication 3 ML: at 10:09

## 2020-09-12 RX ADMIN — LORAZEPAM 2 MG: 2 INJECTION INTRAMUSCULAR; INTRAVENOUS at 12:09

## 2020-09-12 RX ADMIN — DOCUSATE SODIUM 100 MG: 100 CAPSULE, LIQUID FILLED ORAL at 10:09

## 2020-09-12 RX ADMIN — SERTRALINE 25 MG: 25 TABLET, FILM COATED ORAL at 10:09

## 2020-09-12 RX ADMIN — LIDOCAINE HYDROCHLORIDE 10 MG: 10 INJECTION INFILTRATION; PERINEURAL at 01:09

## 2020-09-12 RX ADMIN — POTASSIUM CHLORIDE 10 MEQ: 7.46 INJECTION, SOLUTION INTRAVENOUS at 02:09

## 2020-09-12 NOTE — CONSULTS
Lumbar puncture procedure note ( Unsuccessful)     Consent was not obtained from patient due to her recurrent seizures.  Consent was signed by 2 physicians given concern for infectious encephalitis    A time-out was performed. My hands were washed immediately prior to the procedure. I wore a mask with protective eyewear, sterile gown and sterile gloves throughout the procedure. The patient was placed in the left lateral position with help from the nursing staff. The area was cleansed and draped in usual sterile fashion using betadine scrub. Anesthesia was achieved with 1% lidocaine ( 10 cc) . A 20-gauge 3.5-inch spinal needle was placed in the L4-L5 and subsequently L5-S1 lumbar interspace.  Initially the spinal needle was placed in the L4-L5 I space with no return of spinal fluid after which the spinal fluid was placed in the L5-S1 interspace.  Unfortunately there was no return of spinal fluid and the procedure was stopped.  A sterile bandaid was placed over the puncture site. The patient had no immediate complications and tolerated the procedure well.  Estimated blood loss was less than 0.5cc.          Patria Evans MD  Medicine-Neurology, Clinical Neurophysiology

## 2020-09-12 NOTE — ASSESSMENT & PLAN NOTE
- On arrival to ED, K+ 2.9  - Replaced with potassium chloride 10mEq x1 dose  - Repeat BMP pending

## 2020-09-12 NOTE — ED NOTES
Hourly rounding on pt completed.  ED sitter Desirae at bedside for direct patient observation performing q 15 min checks per psych protocol.  Pt semi-fajardo's in stretcher, AAOx4, pt remains calm and cooperative, RR unlabored with equal bilateral expansion, Will continue to monitor.

## 2020-09-12 NOTE — ED NOTES
MD Evans at bedside, Pt had an LP performed with several unsuccessful attempts. Pt tolerated procedure well. Pt laying flat on back, resting. Will continue to monitor pt.

## 2020-09-12 NOTE — ASSESSMENT & PLAN NOTE
- History of depression with anxiety and prior psychiatric hospitalizations  - [facility] 9/9/2020 for hallucinations and altered mental status (per medical record from [facility]) and will lift  - Monitor for mood changes  - Continue home dose sertraline 25 mg daily

## 2020-09-12 NOTE — ED NOTES
Hourly rounding on pt completed.  ED sitter Desirae at bedside  for direct patient observation . Pt remains calm and cooperative, RR unlabored with equal bilateral expansion, Will continue to monitor

## 2020-09-12 NOTE — ED NOTES
Pt had seizure like activity that lasted 26 seconds. Finding pt more often starting to repeat herself. Will continue to monitor. MD arellano notified.

## 2020-09-12 NOTE — ED NOTES
Pt resting, NAD noted. Pt under direct observation by ED sitter Desirae. Will continue to monitor.
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
## 2020-09-12 NOTE — ED NOTES
Pt awake, able to follow commands, answer questions. Pt oriented X 4. No seizures noted at this time. Sitter remains at bedside for direct observation

## 2020-09-12 NOTE — CONSULTS
NEUROLOGY CONSULT NOTE  [facility]    Patient Name:  Donna Arias  Date of Consult: 2020  Admit Date:    MR #:  [id number]  Acct #:  [id number]  :  1960    Physicians:  Primary Doctor No (Family); Kerry Tamayo MD  Consulting Physician:  Patria Evans MD       Chief Complaint/Reason for Consult: Seizure like activity      Assessment and Plan:  Donna Arias is a 60 y.o. w/ a h/o anxiety and depression ( unable to confrim pst history) presents from [facility] for evaluation of recurrent seizure like activity which hs persisted since she arrived. The patient was treated with 2 mg of ativan and 10 mg of Versed. In the ER she has had continued seizure activity lasting between 10 seconds to 2 minutes happening at every 5 minute or less interval. She was treated with Keppra 4 gram iv, Vimpat 800mg iv with mild improvement in seizure frequency but she continues to have episodes every 30 minutes or so.     Head CT reveals hypoattenuation in the right frontal lobe, brain MRI with changes involving the right frontal lobe increased signal , cortical banding which can be seen with seizure activity.     On examination the patient is alert but has intermittent head versive seizures with head turn to the left, left eye deviation, increased tone involving right upper extremity, inability to speak as well.     Assessment:  1. Status epilepticus  2. Right frontal lobe increased signal- possibly 2/2 seizure activity ( stroke less likely), would evaluation for infectious/ inflammtory encephalitis   3. H/o C spine stenosis     Recommendations:  1. Load with Keppra  4 grams iv x 1 ( 50 mg /kg) given yesterday , continue 1 gram iv bid  2.Vimpat 10 mg/kg -  800mg iv x1 given then 100 mg bid   3. Stat cEEG- transfer to [facility] Neuro ICU   4. Clobazam 10 mg daily  5. Monitor electrolytes, maintain magnesium >2  , check serum paraneoplastic panel   6. Send CSF for cell count, culture, HSV PCR,  paraneoplastic panel, HSV PCR, WNV PCR, cytology , lyme, cryptococcal antigen   7. Consider treatment for HSV encephalitis pending LP.             Laboratory and Additional Data Reviewed:      Tests to date: All imaging reviewed personally by me in addition to cardiology reports.  MRI Brain W WO Contrast   Final Result   Addendum 1 of 1      Along with acute infarction, encephalitis should also be considered in the    differential.         Electronically signed by: Bravo Novak MD   Date:    09/12/2020   Time:    10:47      Final      X-Ray Chest AP Portable   Final Result      No radiographic acute intrathoracic process seen on this single view.         Electronically signed by: Casey Stone MD   Date:    09/11/2020   Time:    13:58      X-Ray Abdomen AP 1 View (KUB)   Final Result      No acute process seen.         Electronically signed by: Adan Santos MD   Date:    09/11/2020   Time:    13:56      CT Head Without Contrast   Final Result      1. Hypoattenuation involving the superior right frontal lobe with involvement of cortex and underlying white matter.  While this may represent acute or subacute ischemia, additional differential consideration given the imaging appearance and clinical history would include neoplastic, infectious, inflammatory processes.  As such, further evaluation contrast-enhanced brain MRI would be recommended for further evaluation.   2. No acute intracranial hemorrhage or midline shift.   This critical information above was relayed by myself by telephone to Dr. Hughes on 09/11/2020 at 09:50.         Electronically signed by: Jose Carlos Penn   Date:    09/11/2020   Time:    09:55          Labs: All labs reviewed by me.  Lab Results   Component Value Date    WBC 9.62 09/12/2020    HGB 11.2 (L) 09/12/2020    HCT 34.7 (L) 09/12/2020     09/12/2020    ALT 26 09/12/2020    AST 23 09/12/2020     09/12/2020    K 2.8 (L) 09/12/2020    CL 99 09/12/2020    CREATININE 0.6  09/12/2020    BUN 11 09/12/2020    TSH 1.100 09/12/2020         History:   History reviewed. No pertinent past medical history.  History reviewed. No pertinent surgical history.  Family History   Problem Relation Age of Onset    Epilepsy Mother     No Known Problems Father      Social History     Socioeconomic History    Marital status: Single     Spouse name: Not on file    Number of children: Not on file    Years of education: Not on file    Highest education level: Not on file   Occupational History    Not on file   Social Needs    Financial resource strain: Not on file    Food insecurity     Worry: Not on file     Inability: Not on file    Transportation needs     Medical: Not on file     Non-medical: Not on file   Tobacco Use    Smoking status: Current Every Day Smoker     Types: Cigarettes   Substance and Sexual Activity    Alcohol use: Yes    Drug use: Not on file    Sexual activity: Not on file   Lifestyle    Physical activity     Days per week: Not on file     Minutes per session: Not on file    Stress: Not on file   Relationships    Social connections     Talks on phone: Not on file     Gets together: Not on file     Attends Zoroastrian service: Not on file     Active member of club or organization: Not on file     Attends meetings of clubs or organizations: Not on file     Relationship status: Not on file   Other Topics Concern    Not on file   Social History Narrative    Not on file       Allergy Information:        I have reviewed the patient's allergies.       Benadryl [diphenhydramine hcl], Penicillins, and Prednisone    Home Medications:   No current facility-administered medications on file prior to encounter.      Current Outpatient Medications on File Prior to Encounter   Medication Sig Dispense Refill    gabapentin (NEURONTIN) 100 MG capsule Take 100 mg by mouth 3 (three) times daily.      multivitamin (ONE DAILY MULTIVITAMIN) per tablet Take 1 tablet by mouth once daily.       QUEtiapine (SEROQUEL) 25 MG Tab Take 12.5 mg by mouth every evening.      sertraline (ZOLOFT) 25 MG tablet Take 25 mg by mouth once daily.         Hospital Medications Reviewed in EPIC   cloBAZam  10 mg Oral Daily    docusate sodium  100 mg Oral BID    enoxaparin  40 mg Subcutaneous Q24H    [START ON 9/13/2020] lacosamide (VIMPAT) IVPB  200 mg Intravenous Q12H    levetiracetam IVPB  1,000 mg Intravenous Q12H    lorazepam  2 mg Intravenous Once    multivitamin  1 tablet Oral Daily    potassium chloride in water  10 mEq Intravenous Q1H    sertraline  25 mg Oral Daily       Review of Systems:    Review of Systems   Unable to perform ROS: Acuity of condition       Physical Examination:  Vital signs in last 24 hours:  Pulse:  [] 95  Resp:  [18-36] 21  SpO2:  [94 %-100 %] 100 %  BP: (112-177)/() 155/77      GENERAL:  General appearance: Well, non-toxic appearing.  No apparent distress.    Extremities: normal.    MENTAL STATUS:  Alertness, attention span & concentration: patient had 2  seizures throughout exam lasting 30 seconds, is able to speak and follow commands when not seizing. Ativan ordered  Language: normal.  Orientation to self, place & time: when not having seizure like activity able to state name, Presidents name and month, unable to speak during seizure like events   Memory, recent & remote: 3/3 immediate recall , 0/3 recent recall    SPEECH:  Clear and fluent when she speaks intermittently     CRANIAL NERVES:  Cranial Nerves II-XII were examined.  II - Visual fields: normal.  III, IV, VI: PERRL, EOMI, No ptosis, No nystagmus.  V - Facial sensation: corneal reflex intact   VII - Face symmetry & mobility:Mild  Left facial droop   VIII - Hearing: normal when able to follow commands   IX, X - Palate: normal elevation no asymmetry   XI - Shoulder shrug: intact bilaterally  XII - Tongue protrusion: intact     GROSS MOTOR:  Gait & station:  Not examined due to recurrent seizures   Tone:  normal.  Abnormal movements: none.  Finger-nose & Heel-knee-shin: intact     MUSCLE STRENGTH:   5/5 right upper and left upper extremity, left foot DF, pF-4/5     REFLEXES:    RIGHT Reflex   LEFT   2+ Biceps 2+   2+ Brachiorad. 2+   2+ Triceps 2+        3+ Patellar 3+   2+ Ankle 2+        Down PLANTAR down     SENSORY:  Intact to light touch bilaterally      40 minutes spent face-to-face, >50% spent advising about: counseling and/or coordination of care           Patria Evans M.D    Medicine-Neurology, Clinical Neurophysiology    This note was created with voice recognition software.  Grammatical, syntax and spelling errors may be inevitable.

## 2020-09-12 NOTE — H&P
[facility] Hospital Medicine  History & Physical    Patient Name: Donna Arias  MRN: [id number]  Admission Date: 9/11/2020  Attending Physician: Kerry Tamayo MD   Primary Care Provider: Primary Doctor No         Patient information was obtained from past medical records and ER records.     Subjective:     Principal Problem:New onset seizure    Chief Complaint:   Chief Complaint   Patient presents with    Seizures     Focal seizure this am per [facility]. Pt is currently PEC'D for hallucinations. No hx of seizures        HPI: Donna Arias is a 60 year old female with medical history of The patient has medical history of depression with anxiety, sciatica, and cervical disc disease. She presented to the [facility] via EMS with report of continuous seizures that were first noted this morning at approximately 0815 this morning.  Patient unable to provide history and history gotten from [facility].  Per medical record, the patient was admitted to [facility] as PEC from  [facility] ([place], [place]) after being found with hallucinations and altered mental status.  Report states that patient sustained fall approximately September 3rd, but no other information was available regarding possible head injury.  Per [facility] ED report, EMS treated patient en route with versed and ativan.  Initial imaging in ED with CT head with hypoattenuation involving the superior right frontal lobe with involvement of cortex and underlying white matter, but no evidence of hemorrhage.  MRI with changes involving the right frontal lobe increased signal without ACD correlation. Patient was admitted to Hospital Medicine for further management and evaluation of seizure activity.  Potassium 2.9 and was treated with IV potassium chloride 10 mEp for one dose.       After admission, General Neurology was consulted and the patient was loaded with Keppra 50 mg/kg (4gm).   Urine toxicology pending.  Case discussed with Neurocritical Care and agree with transfer to [facility] for continuous EEG monitoring in Neuro ICU.          History reviewed. No pertinent past medical history.    History reviewed. No pertinent surgical history.    Review of patient's allergies indicates:   Allergen Reactions    Benadryl [diphenhydramine hcl]     Penicillins Other (See Comments)     unknown    Prednisone        No current facility-administered medications on file prior to encounter.      Current Outpatient Medications on File Prior to Encounter   Medication Sig    gabapentin (NEURONTIN) 100 MG capsule Take 100 mg by mouth 3 (three) times daily.    multivitamin (ONE DAILY MULTIVITAMIN) per tablet Take 1 tablet by mouth once daily.    QUEtiapine (SEROQUEL) 25 MG Tab Take 12.5 mg by mouth every evening.    sertraline (ZOLOFT) 25 MG tablet Take 25 mg by mouth once daily.     Family History     Problem Relation (Age of Onset)    Epilepsy Mother    No Known Problems Father        Tobacco Use    Smoking status: Current Every Day Smoker     Types: Cigarettes   Substance and Sexual Activity    Alcohol use: Yes    Drug use: Not on file    Sexual activity: Not on file     Review of Systems   Unable to perform ROS: Mental status change     Objective:     Vital Signs (Most Recent):  Temp: 98.6 °F (37 °C) (09/11/20 0850)  Pulse: 91 (09/11/20 1902)  Resp: (!) 25 (09/11/20 1433)  BP: (!) 143/68 (09/11/20 1902)  SpO2: 100 % (09/11/20 1902) Vital Signs (24h Range):  Temp:  [98.6 °F (37 °C)] 98.6 °F (37 °C)  Pulse:  [] 91  Resp:  [18-25] 25  SpO2:  [96 %-100 %] 100 %  BP: (118-177)/(59-87) 143/68     Weight: 78 kg (171 lb 15.3 oz)  Body mass index is 28.65 kg/m².    Physical Exam  Vitals signs and nursing note reviewed.   Constitutional:       Appearance: She is well-developed.   HENT:      Head: Normocephalic and atraumatic.      Mouth/Throat:      Mouth: Mucous membranes are dry.       Pharynx: Oropharynx is clear.   Eyes:      General: Lids are normal. No scleral icterus.     Conjunctiva/sclera: Conjunctivae normal.      Pupils: Pupils are equal, round, and reactive to light.   Neck:      Musculoskeletal: Normal range of motion and neck supple. No neck rigidity.      Vascular: No JVD.   Cardiovascular:      Rate and Rhythm: Regular rhythm. Tachycardia present.      Pulses: Normal pulses.      Heart sounds: Normal heart sounds.   Pulmonary:      Effort: Pulmonary effort is normal.      Breath sounds: Normal breath sounds.   Abdominal:      General: Abdomen is flat. Bowel sounds are normal.      Palpations: Abdomen is soft.      Tenderness: There is no abdominal tenderness.   Skin:     General: Skin is warm and dry.   Neurological:      Mental Status: She is lethargic.      GCS: GCS eye subscore is 4. GCS verbal subscore is 5. GCS motor subscore is 6.      Sensory: Sensation is intact.      Motor: Weakness and seizure activity present.      Coordination: Coordination abnormal.      Gait: Gait (not tested) normal.      Comments: Oriented to self  Follows command   Psychiatric:         Attention and Perception: She is inattentive.         Behavior: Behavior is withdrawn.           CRANIAL NERVES     CN III, IV, VI   Pupils are equal, round, and reactive to light.       Significant Labs:   CBC:   Recent Labs   Lab 09/11/20  0915   WBC 10.23   HGB 11.6*   HCT 34.8*        CMP:   Recent Labs   Lab 09/11/20 0915 09/11/20  1716    138   K 2.9* 3.1*   CL 98 98   CO2 25 27   * 105   BUN 14 12   CREATININE 0.7 0.7   CALCIUM 9.0 9.3   PROT 6.7  --    ALBUMIN 3.8  --    BILITOT 0.3  --    ALKPHOS 88  --    AST 25  --    ALT 25  --    ANIONGAP 14 13   EGFRNONAA >60 >60     Urine Studies:   Recent Labs   Lab 09/11/20  1909   COLORU Yellow   APPEARANCEUA Clear   PHUR 6.0   SPECGRAV 1.015   PROTEINUA Negative   GLUCUA Negative   KETONESU Negative   BILIRUBINUA Negative   OCCULTUA Negative    NITRITE Negative   UROBILINOGEN Negative   LEUKOCYTESUR Negative       All pertinent labs within the past 24 hours have been reviewed.    Significant Imaging: I have reviewed all pertinent imaging results/findings within the past 24 hours.     MRI Brain W WO Contrast  Narrative: EXAMINATION:  MRI BRAIN W WO CONTRAST    CLINICAL HISTORY:  abnormal head CT;    TECHNIQUE:  Multiplanar multisequence MR imaging of the brain was performed before and after the administration of 7 mL Gadavist intravenous contrast.    COMPARISON:  Same-day CT    FINDINGS:  Significant motion artifact throughout all sequences.    There is restricted diffusion in the right frontal lobe cortex with associated T2 hyperintensity and mild sulcal effacement.    Ventricles and sulci are normal in size for age without evidence of hydrocephalus.  No intracranial blood products.  No extra-axial blood or fluid collections.  No definite abnormal enhancement.    Normal vascular flow voids are preserved.    Skull/Extracranial Contents (limited evaluation): Bone marrow signal intensity is normal.  Impression: Restricted diffusion and edema throughout the right frontal lobe with sulcal effacement.  Findings concerning for acute infarction in the right CESILIA territory.  No definite underlying mass lesion or abnormal postcontrast enhancement, however, there is significant motion artifact which limits sensitivity.    Electronically signed by: Bravo Novak MD  Date:    09/11/2020  Time:    16:58  X-Ray Chest AP Portable  Narrative: EXAMINATION:  XR CHEST AP PORTABLE    TECHNIQUE:  Single frontal view of the chest was performed.    COMPARISON:  None    FINDINGS:  Monitoring leads overlie the chest.  Patient is rotated.  Resolution is limited by body habitus with underpenetration.    Cardiomediastinal silhouette is midline and within normal limits for age.  The lungs are well expanded without large consolidation, pleural effusion or pneumothorax noting slight  nonspecific elevation of the right hemidiaphragm.  Pulmonary vasculature and hilar contours are within normal limits.  No acute osseous process seen.  PA and lateral views can be obtained.  Impression: No radiographic acute intrathoracic process seen on this single view.    Electronically signed by: Casey Stone MD  Date:    09/11/2020  Time:    13:58  X-Ray Abdomen AP 1 View (KUB)  Narrative: EXAMINATION:  XR ABDOMEN AP 1 VIEW    FINDINGS:  No obstruction, ileus, or perforation seen.  No trauma seen.  Impression: No acute process seen.    Electronically signed by: Adan Santos MD  Date:    09/11/2020  Time:    13:56  CT Head Without Contrast  Narrative: EXAMINATION:  CT HEAD WITHOUT CONTRAST    CLINICAL HISTORY:  Seizure, nontraumatic (Age => 41y);    TECHNIQUE:  Low dose axial images were obtained through the head.  Coronal and sagittal reformations were also performed. Contrast was not administered.    COMPARISON:  None.    FINDINGS:  There is hypoattenuation involving superomedial right frontal lobe.  There is some blurring of gray-white differentiation.  Hypoattenuation involve the underlying white matter.    There is no evidence of acute intracranial hemorrhage, mass effect, or midline shift.  Generalized age-related parenchymal volume loss is noted.  No evidence of hydrocephalus is seen.  No extra-axial fluid collection is appreciated.  Intracranial atherosclerosis.  No aggressive appearing osseous lesion.  Relatively minor paranasal sinus mucosal thickening is noted.  Leftward gaze deviation is appreciated.  Extracranial soft tissue structures unremarkable.  Impression: 1. Hypoattenuation involving the superior right frontal lobe with involvement of cortex and underlying white matter.  While this may represent acute or subacute ischemia, additional differential consideration given the imaging appearance and clinical history would include neoplastic, infectious, inflammatory processes.  As such, further  evaluation contrast-enhanced brain MRI would be recommended for further evaluation.  2. No acute intracranial hemorrhage or midline shift.  This critical information above was relayed by myself by telephone to Dr. Hughes on 09/11/2020 at 09:50.    Electronically signed by: Jose Carlos Penn  Date:    09/11/2020  Time:    09:55      Assessment/Plan:     * New onset seizure  - Patient presented with new onset seizure activity with report of possible head trauma 9/3/2020  - Per medical record, patient reported found in neighbor yard with altered mental status and report of hallucinations on 9/09/2020  - Subsequent PEC 9/10/2020 and sent to [facility] in [place], [place]  - On arrival to ED, Head CT reveals hypoattenuation in the right frontal lobe, brain MRI with changes involving the right frontal lobe increased signal   - General Neurology consult placed  - Load with Keppra 50 mg/kg (4 gm) and continue Keppra 1000 mg twice daily  - Decision to transfer patient to Neuro Critical Care at [facility] for continuous EEG monitoring      Severe episode of recurrent major depressive disorder, with psychotic features  - History of depression with anxiety  - PEC 9/9/2020 for hallucinations and altered mental status (per medical record from [facility])  - Continue home dose sertraline 25 mg daily        Cervical disc disease  - Noted from medical record  - Monitor      Bilateral sciatica  - Noted from medical record  - Monitor      Hypokalemia  - On arrival to ED, K+ 2.9  - Replaced with potassium chloride 10mEq x1 dose  - Repeat BMP pending      VTE Risk Mitigation (From admission, onward)         Ordered     IP VTE LOW RISK PATIENT  Once      09/11/20 1915     Place sequential compression device  Until discontinued      09/11/20 1718                   Casandra Noyola NP  Department of Hospital Medicine   [facility]

## 2020-09-12 NOTE — ED NOTES
Pt had seizure like activity that lasted 14.5 seconds. MD Evans at bedside to witness. Will continue to monitor.

## 2020-09-12 NOTE — ED NOTES
Hourly rounding on pt completed.  ED sitter Desirae at bedsideperforming q 15 min checks per psych protocol. Pt semi-fajardo's in stretcher, AAOx4, pt remains calm and cooperative, RR unlabored with equal bilateral expansion, Will continue to monitor

## 2020-09-12 NOTE — ED NOTES
Pt care assumed. Pt resting in bed. Sitter at bedside for direct observation. Pt lethargic. Slowly able to open eyes, unable to keep open, only able to follow command for rt hand , unable to follow commands to move toes or left hand . Pupils reactive, round, brisk and equal, size 3 mm bilaterally. No seizure activity noted at this time. Will continue to monitor.

## 2020-09-12 NOTE — ED NOTES
Hourly rounding on pt completed.  ED sitter Desirae at bedside with no personal items for direct patient observation performing q 15 min checks per psych protocol. PEC protocol continued, pt in paper scrubs and non-skid socks, no harmful objects in room, pt remains free from injury.  Pt semi-fajardo's in stretcher, AAOx4, pt remains calm and cooperative, RR unlabored with equal bilateral expansion, Will continue to monitor

## 2020-09-12 NOTE — SUBJECTIVE & OBJECTIVE
History reviewed. No pertinent past medical history.    History reviewed. No pertinent surgical history.    Review of patient's allergies indicates:   Allergen Reactions    Benadryl [diphenhydramine hcl]     Penicillins Other (See Comments)     unknown    Prednisone        No current facility-administered medications on file prior to encounter.      Current Outpatient Medications on File Prior to Encounter   Medication Sig    gabapentin (NEURONTIN) 100 MG capsule Take 100 mg by mouth 3 (three) times daily.    multivitamin (ONE DAILY MULTIVITAMIN) per tablet Take 1 tablet by mouth once daily.    QUEtiapine (SEROQUEL) 25 MG Tab Take 12.5 mg by mouth every evening.    sertraline (ZOLOFT) 25 MG tablet Take 25 mg by mouth once daily.     Family History     Problem Relation (Age of Onset)    Epilepsy Mother    No Known Problems Father        Tobacco Use    Smoking status: Current Every Day Smoker     Types: Cigarettes   Substance and Sexual Activity    Alcohol use: Yes    Drug use: Not on file    Sexual activity: Not on file     Review of Systems   Unable to perform ROS: Mental status change     Objective:     Vital Signs (Most Recent):  Temp: 98.6 °F (37 °C) (09/11/20 0850)  Pulse: 91 (09/11/20 1902)  Resp: (!) 25 (09/11/20 1433)  BP: (!) 143/68 (09/11/20 1902)  SpO2: 100 % (09/11/20 1902) Vital Signs (24h Range):  Temp:  [98.6 °F (37 °C)] 98.6 °F (37 °C)  Pulse:  [] 91  Resp:  [18-25] 25  SpO2:  [96 %-100 %] 100 %  BP: (118-177)/(59-87) 143/68     Weight: 78 kg (171 lb 15.3 oz)  Body mass index is 28.65 kg/m².    Physical Exam  Vitals signs and nursing note reviewed.   Constitutional:       Appearance: She is well-developed.   HENT:      Head: Normocephalic and atraumatic.      Mouth/Throat:      Mouth: Mucous membranes are dry.      Pharynx: Oropharynx is clear.   Eyes:      General: Lids are normal. No scleral icterus.     Conjunctiva/sclera: Conjunctivae normal.      Pupils: Pupils are equal, round,  and reactive to light.   Neck:      Musculoskeletal: Normal range of motion and neck supple. No neck rigidity.      Vascular: No JVD.   Cardiovascular:      Rate and Rhythm: Regular rhythm. Tachycardia present.      Pulses: Normal pulses.      Heart sounds: Normal heart sounds.   Pulmonary:      Effort: Pulmonary effort is normal.      Breath sounds: Normal breath sounds.   Abdominal:      General: Abdomen is flat. Bowel sounds are normal.      Palpations: Abdomen is soft.      Tenderness: There is no abdominal tenderness.   Skin:     General: Skin is warm and dry.   Neurological:      Mental Status: She is lethargic.      GCS: GCS eye subscore is 4. GCS verbal subscore is 5. GCS motor subscore is 6.      Sensory: Sensation is intact.      Motor: Weakness and seizure activity present.      Coordination: Coordination abnormal.      Gait: Gait (not tested) normal.      Comments: Oriented to self  Follows command   Psychiatric:         Attention and Perception: She is inattentive.         Behavior: Behavior is withdrawn.           CRANIAL NERVES     CN III, IV, VI   Pupils are equal, round, and reactive to light.       Significant Labs:   CBC:   Recent Labs   Lab 09/11/20  0915   WBC 10.23   HGB 11.6*   HCT 34.8*        CMP:   Recent Labs   Lab 09/11/20 0915 09/11/20  1716    138   K 2.9* 3.1*   CL 98 98   CO2 25 27   * 105   BUN 14 12   CREATININE 0.7 0.7   CALCIUM 9.0 9.3   PROT 6.7  --    ALBUMIN 3.8  --    BILITOT 0.3  --    ALKPHOS 88  --    AST 25  --    ALT 25  --    ANIONGAP 14 13   EGFRNONAA >60 >60     Urine Studies:   Recent Labs   Lab 09/11/20  1909   COLORU Yellow   APPEARANCEUA Clear   PHUR 6.0   SPECGRAV 1.015   PROTEINUA Negative   GLUCUA Negative   KETONESU Negative   BILIRUBINUA Negative   OCCULTUA Negative   NITRITE Negative   UROBILINOGEN Negative   LEUKOCYTESUR Negative       All pertinent labs within the past 24 hours have been reviewed.    Significant Imaging: I have reviewed  all pertinent imaging results/findings within the past 24 hours.     MRI Brain W WO Contrast  Narrative: EXAMINATION:  MRI BRAIN W WO CONTRAST    CLINICAL HISTORY:  abnormal head CT;    TECHNIQUE:  Multiplanar multisequence MR imaging of the brain was performed before and after the administration of 7 mL Gadavist intravenous contrast.    COMPARISON:  Same-day CT    FINDINGS:  Significant motion artifact throughout all sequences.    There is restricted diffusion in the right frontal lobe cortex with associated T2 hyperintensity and mild sulcal effacement.    Ventricles and sulci are normal in size for age without evidence of hydrocephalus.  No intracranial blood products.  No extra-axial blood or fluid collections.  No definite abnormal enhancement.    Normal vascular flow voids are preserved.    Skull/Extracranial Contents (limited evaluation): Bone marrow signal intensity is normal.  Impression: Restricted diffusion and edema throughout the right frontal lobe with sulcal effacement.  Findings concerning for acute infarction in the right CESILIA territory.  No definite underlying mass lesion or abnormal postcontrast enhancement, however, there is significant motion artifact which limits sensitivity.    Electronically signed by: Bravo Novak MD  Date:    09/11/2020  Time:    16:58  X-Ray Chest AP Portable  Narrative: EXAMINATION:  XR CHEST AP PORTABLE    TECHNIQUE:  Single frontal view of the chest was performed.    COMPARISON:  None    FINDINGS:  Monitoring leads overlie the chest.  Patient is rotated.  Resolution is limited by body habitus with underpenetration.    Cardiomediastinal silhouette is midline and within normal limits for age.  The lungs are well expanded without large consolidation, pleural effusion or pneumothorax noting slight nonspecific elevation of the right hemidiaphragm.  Pulmonary vasculature and hilar contours are within normal limits.  No acute osseous process seen.  PA and lateral views can  be obtained.  Impression: No radiographic acute intrathoracic process seen on this single view.    Electronically signed by: Casey Stone MD  Date:    09/11/2020  Time:    13:58  X-Ray Abdomen AP 1 View (KUB)  Narrative: EXAMINATION:  XR ABDOMEN AP 1 VIEW    FINDINGS:  No obstruction, ileus, or perforation seen.  No trauma seen.  Impression: No acute process seen.    Electronically signed by: Adan Santos MD  Date:    09/11/2020  Time:    13:56  CT Head Without Contrast  Narrative: EXAMINATION:  CT HEAD WITHOUT CONTRAST    CLINICAL HISTORY:  Seizure, nontraumatic (Age => 41y);    TECHNIQUE:  Low dose axial images were obtained through the head.  Coronal and sagittal reformations were also performed. Contrast was not administered.    COMPARISON:  None.    FINDINGS:  There is hypoattenuation involving superomedial right frontal lobe.  There is some blurring of gray-white differentiation.  Hypoattenuation involve the underlying white matter.    There is no evidence of acute intracranial hemorrhage, mass effect, or midline shift.  Generalized age-related parenchymal volume loss is noted.  No evidence of hydrocephalus is seen.  No extra-axial fluid collection is appreciated.  Intracranial atherosclerosis.  No aggressive appearing osseous lesion.  Relatively minor paranasal sinus mucosal thickening is noted.  Leftward gaze deviation is appreciated.  Extracranial soft tissue structures unremarkable.  Impression: 1. Hypoattenuation involving the superior right frontal lobe with involvement of cortex and underlying white matter.  While this may represent acute or subacute ischemia, additional differential consideration given the imaging appearance and clinical history would include neoplastic, infectious, inflammatory processes.  As such, further evaluation contrast-enhanced brain MRI would be recommended for further evaluation.  2. No acute intracranial hemorrhage or midline shift.  This critical information above was  relayed by myself by telephone to Dr. Hughes on 09/11/2020 at 09:50.    Electronically signed by: Jose Carlos Penn  Date:    09/11/2020  Time:    09:55

## 2020-09-12 NOTE — ASSESSMENT & PLAN NOTE
- Patient presented with new onset seizure activity with report of possible head trauma 9/3/2020  - Per medical record, patient reported found in neighbor yard with altered mental status and report of hallucinations on 9/09/2020  - Subsequent PEC 9/10/2020 and sent to [facility] in [place], [place]  - On arrival to ED, Head CT reveals hypoattenuation in the right frontal lobe, brain MRI with changes involving the right frontal lobe increased signal   - General Neurology consult placed  - Load with Keppra 50 mg/kg (4 gm) and continue Keppra 1000 mg twice daily  - Decision to transfer patient to Neuro Critical Care at [facility] for continuous EEG monitoring

## 2020-09-12 NOTE — ASSESSMENT & PLAN NOTE
- On arrival to ED, K+ 2.9, 3.1, 2.8  - Replace as needed    - Follow with labs 2 hours after potassium administration

## 2020-09-12 NOTE — ASSESSMENT & PLAN NOTE
- History of depression with anxiety  - [facility] 9/9/2020 for hallucinations and altered mental status (per medical record from [facility])  - Continue home dose sertraline 25 mg daily

## 2020-09-12 NOTE — SUBJECTIVE & OBJECTIVE
Interval History: Patient continues with iintermittent seizures with head turn to the left, left eye deviation with facial twitching lasting approximate 20 seconds to 41 seconds.  Vital signs stable.  Vimpat loading dose in progress now.  Discussed case with [facility] Broderick and patient awaiting transfer to Neuro ICU.  EEG will be ordered while awaiting transfer.  Will lift PEC as set to .     Review of Systems   Unable to perform ROS: Mental status change     Objective:     Vital Signs (Most Recent):  Temp: 98.6 °F (37 °C) (20 0850)  Pulse: 92 (20 0832)  Resp: (!) 36 (20 0131)  BP: (!) 143/79 (20 0832)  SpO2: 97 % (20 0832) Vital Signs (24h Range):  Pulse:  [] 92  Resp:  [18-36] 36  SpO2:  [94 %-100 %] 97 %  BP: (112-177)/() 143/79     Weight: 78 kg (171 lb 15.3 oz)  Body mass index is 28.65 kg/m².    Intake/Output Summary (Last 24 hours) at 2020 0919  Last data filed at 2020  Gross per 24 hour   Intake 300 ml   Output 1700 ml   Net -1400 ml      Physical Exam  Vitals signs and nursing note reviewed.   Constitutional:       Appearance: She is well-developed. She is ill-appearing.   HENT:      Head: Normocephalic and atraumatic.      Mouth/Throat:      Mouth: Mucous membranes are dry.      Pharynx: Oropharynx is clear.   Eyes:      General: Lids are normal. No scleral icterus.     Conjunctiva/sclera: Conjunctivae normal.      Pupils: Pupils are equal, round, and reactive to light.   Neck:      Musculoskeletal: Normal range of motion and neck supple. No neck rigidity.      Vascular: No JVD.   Cardiovascular:      Rate and Rhythm: Regular rhythm. Tachycardia present.      Pulses: Normal pulses.      Heart sounds: Normal heart sounds.   Pulmonary:      Effort: Pulmonary effort is normal. No respiratory distress.      Breath sounds: Normal breath sounds.   Abdominal:      General: Abdomen is flat. Bowel sounds are normal.      Palpations:  Abdomen is soft.      Tenderness: There is no abdominal tenderness.   Skin:     General: Skin is warm and dry.   Neurological:      Mental Status: She is alert.      GCS: GCS eye subscore is 4. GCS verbal subscore is 5. GCS motor subscore is 6.      Sensory: Sensation is intact.      Motor: Weakness and seizure activity present.      Coordination: Coordination normal.      Gait: Gait (not tested) normal.      Comments: Oriented to self and place  Follows commands when not actively seizing   Psychiatric:         Attention and Perception: She is attentive.         Behavior: Behavior is not withdrawn.      Comments: When not actively seizing, can answer questions and follow commands         Significant Labs:   CBC:   Recent Labs   Lab 09/11/20  0915 09/12/20  0630   WBC 10.23 9.62   HGB 11.6* 11.2*   HCT 34.8* 34.7*    337     CMP:   Recent Labs   Lab 09/11/20  0915 09/11/20  1716 09/12/20  0630    138 137   K 2.9* 3.1* 2.8*   CL 98 98 99   CO2 25 27 28   * 105 109   BUN 14 12 11   CREATININE 0.7 0.7 0.6   CALCIUM 9.0 9.3 8.9   PROT 6.7  --  6.4   ALBUMIN 3.8  --  3.6   BILITOT 0.3  --  0.4   ALKPHOS 88  --  84   AST 25  --  23   ALT 25  --  26   ANIONGAP 14 13 10   EGFRNONAA >60 >60 >60     Urine Studies:   Recent Labs   Lab 09/11/20  1909   COLORU Yellow   APPEARANCEUA Clear   PHUR 6.0   SPECGRAV 1.015   PROTEINUA Negative   GLUCUA Negative   KETONESU Negative   BILIRUBINUA Negative   OCCULTUA Negative   NITRITE Negative   UROBILINOGEN Negative   LEUKOCYTESUR Negative     All pertinent labs within the past 24 hours have been reviewed.    Significant Imaging: I have reviewed all pertinent imaging results/findings within the past 24 hours.

## 2020-09-12 NOTE — ED NOTES
Pt AAOx3, laying in bed calm and cooperative. Pt has even/nonlabored breathing. Pt remains in paper scrubs in a clean, clutter free environment. Pt on continued cardiac, BP and pulse ox. Siteve Howe at bedside with direct observation on pt. Pt denies any SI and any needs at this time. Will continue to monitor.

## 2020-09-12 NOTE — ED NOTES
Pt identifiers Donna Arias were checked and are correct  LOC: The patient is awake, alert, aware of environment with an appropriate affect. Oriented x4, speaking appropriately  APPEARANCE: Pt resting comfortably, in no acute distress, pt is clean and well groomed, clothing properly fastened  Pt has a ramsey catheter to  bag patent draining yellow colored urine  SKIN: Skin warm, dry and intact, normal skin turgor, moist mucus membranes  RESPIRATORY: Airway is open and patent, respirations are spontaneous, even and unlabored, normal effort and rate Breath sounds clear eunice to all lung fields on auscultation  CARDIAC: Normal rate and rhythm, no peripheral edema noted, capillary refill < 3 seconds, bilateral radial pulses 2+  ABDOMEN: Soft, nontender, nondistended. Bowel sounds present to all four quad of abd on auscultation  NEUROLOGIC: PERRL, facial expression is symmetrical, patient moving all extremities spontaneously, normal sensation in all extremities when touched with a finger.  Follows all commands appropriately  MUSCULOSKELETAL: No obvious deformities.

## 2020-09-12 NOTE — ED NOTES
Spoke to pt brother with pt permission. Provided him with a over view and update on pt care. Notified him that I would update/changed his number in the chart. Will continue to monitor pt and update physician on call from the brother.

## 2020-09-12 NOTE — ED NOTES
Pt reports she takes effexor and ativan at night. Reviewed with Hospitalist, NP will not order effexor or ativan, pt NPO status at this time

## 2020-09-12 NOTE — ED NOTES
Pt had focal seizure, head turned to left , eyes gazed to left and twitching to face lasting 30 seconds

## 2020-09-12 NOTE — ED NOTES
Spoke to MD Evans of speaking to pt brother and notified her of pt continued seizure activity. Provided MD with new number for brother for her to call him. Will continue to monitor pt.

## 2020-09-12 NOTE — ASSESSMENT & PLAN NOTE
- On arrival to ED, Head CT reveals hypoattenuation in the right frontal lobe, brain MRI with changes involving the right frontal lobe increased signal   - Continues with intermittent seizures notable by leftward gaze and head turning, +/- facial twitching lasting approximately 20 sec to 41 sec  - General Neurology following   - Load with Keppra 50 mg/kg (4 gm) and continue Keppra 1000 mg twice daily  - Load Vimpat 400 mg IV one dose and continue Vimpat 200 mg twice daily  - [facility]  - EEG pending while awaiting transfer
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
## 2020-09-12 NOTE — ED NOTES
Pt resting while laying flat on back. Pt is arousble by voice and stimulus to her upper extremities. No seizure activity noted at this time. Will continue to monitor.

## 2020-09-12 NOTE — ED NOTES
Performed straight cath on pt with ok orders from MD arellano. During pt straight cath noticed her L sided weakness and less sensitivity to her L leg. Pt is starting to communicate with me better but still zoning out and falling back into he seizure like activity.

## 2020-09-12 NOTE — ED NOTES
Per Neurologist Dr. Evans, hold vimpat administration for now unless patient starts with seizures again.

## 2020-09-12 NOTE — ED PROVIDER NOTES
Encounter Date: 9/11/2020       History     Chief Complaint   Patient presents with    Seizures     Focal seizure this am per [facility]. Pt is currently PEC'D for hallucinations. No hx of seizures    Transfer [facility]     need EEG monitor and it was not available at [facility]      Donna Arias is a 60 y.o. female with history of depression, not taking meds for last month, who was transferred from [facility] for EEG. Per chart review, the patient presented from facility where she was under PEC on 09/11/2020 for hallucinations.  Patient reportedly had no history of seizures prior to that.   While she was in the ER, she had continued seizure activity occurring every 5 min or so.  She was treated with Keppra, Vimpat.  She was evaluated by the Neurology team at [facility] and admitted to the hospital.  Neurology attempted lumbar puncture, but was unsuccessful.   She had a CT scan and MRI scan of the brain, increased signal in the right frontal lobe that Neurology thought might be consistent with seizure activity.  In the emergency room currently, the patient responds to some questionsm but not all of them.      The history is provided by medical records. The history is limited by the condition of the patient. No  was used.        Review of patient's allergies indicates:   Allergen Reactions    Benadryl [diphenhydramine hcl]     Penicillins Other (See Comments)     unknown    Prednisone      Past Medical History:   Diagnosis Date    Anxiety     Depression     Hypertension      History reviewed. No pertinent surgical history.  Family History   Problem Relation Age of Onset    Epilepsy Mother     No Known Problems Father      Social History     Tobacco Use    Smoking status: Current Every Day Smoker     Types: Cigarettes   Substance Use Topics    Alcohol use: Yes    Drug use: Never     Review of Systems   Unable to perform ROS: Mental status change   Constitutional: Negative for fever.    Neurological: Positive for seizures. Negative for facial asymmetry.       Unable to obtain review of systems  Physical Exam     Initial Vitals [09/11/20 0850]   BP Pulse Resp Temp SpO2   128/69 (!) 113 18 98.6 °F (37 °C) 96 %      MAP       --         Physical Exam    Nursing note and vitals reviewed.  Constitutional: She appears well-developed and well-nourished.   HENT:   Head: Normocephalic and atraumatic.   Eyes: Conjunctivae are normal. Pupils are equal, round, and reactive to light.    Ocular movements could not be tested secondary to patient not cooperative with exam     Neck: Neck supple. No tracheal deviation present.   No meningismus    Cardiovascular: Normal rate and regular rhythm.   Pulmonary/Chest: Breath sounds normal. No respiratory distress.   Abdominal: Soft. She exhibits no distension.   Musculoskeletal: Normal range of motion. No edema.   Neurological:   Moves all extremities spontaneously  Nods her head to answer questions    Skin: Skin is warm and dry. Capillary refill takes less than 2 seconds.       Vitals: Afebrile.  Vital signs stable.  General: No acute distress.  Cardio: Regular rate.  Chest: No increased work of breathing.      ED Course   Procedures  Labs Reviewed   CBC W/ AUTO DIFFERENTIAL - Abnormal; Notable for the following components:       Result Value    RBC 3.92 (*)     Hemoglobin 11.6 (*)     Hematocrit 34.8 (*)     Gran # (ANC) 8.6 (*)     Gran% 84.3 (*)     Lymph% 9.6 (*)     All other components within normal limits   COMPREHENSIVE METABOLIC PANEL - Abnormal; Notable for the following components:    Potassium 2.9 (*)     Glucose 123 (*)     All other components within normal limits   BASIC METABOLIC PANEL - Abnormal; Notable for the following components:    Potassium 3.1 (*)     All other components within normal limits   COMPREHENSIVE METABOLIC PANEL - Abnormal; Notable for the following components:    Potassium 2.8 (*)     All other components within normal limits     Narrative:     In AM   CBC W/ AUTO DIFFERENTIAL - Abnormal; Notable for the following components:    RBC 3.89 (*)     Hemoglobin 11.2 (*)     Hematocrit 34.7 (*)     MPV 8.8 (*)     Gran% 76.2 (*)     Lymph% 14.8 (*)     All other components within normal limits   VITAMIN B12 - Abnormal; Notable for the following components:    Vitamin B-12 >2000 (*)     All other components within normal limits    Narrative:     Collection has been rescheduled by [facility] at 09/12/2020 13:09 Reason:   Patient unavailable Dr in room doing a procedure   BASIC METABOLIC PANEL - Abnormal; Notable for the following components:    Potassium 3.3 (*)     All other components within normal limits   URINALYSIS, REFLEX TO URINE CULTURE - Abnormal; Notable for the following components:    Appearance, UA Hazy (*)     Ketones, UA Trace (*)     Occult Blood UA 2+ (*)     All other components within normal limits    Narrative:     Specimen Source->Urine   URINALYSIS MICROSCOPIC - Abnormal; Notable for the following components:    RBC, UA 13 (*)     All other components within normal limits    Narrative:     Specimen Source->Urine   POCT GLUCOSE - Abnormal; Notable for the following components:    POCT Glucose 127 (*)     All other components within normal limits   POCT GLUCOSE - Abnormal; Notable for the following components:    POCT Glucose 136 (*)     All other components within normal limits   CRYPTOCOCCAL ANTIGEN, CSF   CULTURE, CSF  (INCLUDES STAIN)   URINALYSIS, REFLEX TO URINE CULTURE    Narrative:     Specimen Source->Urine   MAGNESIUM   PHOSPHORUS   TOXICOLOGY SCREEN, URINE, RANDOM (COMPLIANCE)    Narrative:     Specimen Source->Urine   TSH    Narrative:     In AM   T4, FREE    Narrative:     In AM   PROTIME-INR   APTT   THYROID PEROXIDASE ANTIBODY   THYROGLOBULIN AB SCREEN   DRUG SCREEN PANEL, URINE EMERGENCY    Narrative:     Specimen Source->Urine   CSF CELL COUNT WITH DIFFERENTIAL   HERPES SIMPLEX (HSV) BY RAPID PCR, CSF   GLUCOSE, CSF    HIV 1 / 2 ANTIBODY   PARANEOPLASTIC AUTOANTIBODY EVAULATION, SERUM   MENINGITIS - ENCEPHALITIS PANEL, CSF   LYME DISEASE WESTERN BLOT   WNV ANTIBODIES, CSF   RPR   PHOSPHATIDYLETHANOL (PETH)    Narrative:     Collection has been rescheduled by TCD at 09/12/2020 13:09 Reason:   Patient unavailable Dr in room doing a procedure   VITAMIN B1    Narrative:     Collection has been rescheduled by TCD at 09/12/2020 13:09 Reason:   Patient unavailable Dr in room doing a procedure   COMPREHENSIVE METABOLIC PANEL   LIPID PANEL   HEMOGLOBIN A1C   CBC W/ AUTO DIFFERENTIAL   MAGNESIUM   PHOSPHORUS   TROPONIN I   CK-MB   PROTIME-INR   SARS-COV-2 RDRP GENE   TYPE & SCREEN   POCT GLUCOSE   POCT GLUCOSE MONITORING CONTINUOUS   POCT GLUCOSE MONITORING CONTINUOUS     EKG Readings: (Independently Interpreted)   Initial Reading: No STEMI. Rhythm: Normal Sinus Rhythm. Heart Rate: 99. Ectopy: No Ectopy. Conduction: Normal. Clinical Impression: Normal Sinus Rhythm       Imaging Results          X-Ray Chest AP Single View (Final result)  Result time 09/12/20 20:07:09    Final result by Casey Stone MD (09/12/20 20:07:09)                 Impression:      No detrimental change or radiographic acute intrathoracic process seen.      Electronically signed by: Casey Stone MD  Date:    09/12/2020  Time:    20:07             Narrative:    EXAMINATION:  XR CHEST 1 VIEW    CLINICAL HISTORY:  stroke;    TECHNIQUE:  Single frontal view of the chest was performed.    COMPARISON:  Chest radiograph 09/11/2020    FINDINGS:  Monitoring leads overlie the chest.  Large body habitus.  No detrimental change.  Trachea is midline and patent.  Cardiomediastinal silhouette is midline and within normal limits.  Pulmonary vasculature and hilar contours are within normal limits.  The lungs are symmetrically well expanded and clear.  No pleural effusion or pneumothorax.  Osseous structures appear intact.                               MRI Brain W WO Contrast  (Edited Result - FINAL)  Result time 09/12/20 10:47:08   Procedure changed from MRI Brain With Contrast     Addendum 1 of 1 by Bravo Novak MD (09/12/20 10:47:08)      Along with acute infarction, encephalitis should also be considered in the differential.      Electronically signed by: Bravo Novak MD  Date:    09/12/2020  Time:    10:47               Final result by Bravo Novak MD (09/11/20 16:58:30)                 Impression:      Restricted diffusion and edema throughout the right frontal lobe with sulcal effacement.  Findings concerning for acute infarction in the right CESILIA territory.  No definite underlying mass lesion or abnormal postcontrast enhancement, however, there is significant motion artifact which limits sensitivity.      Electronically signed by: Bravo Novak MD  Date:    09/11/2020  Time:    16:58             Narrative:    EXAMINATION:  MRI BRAIN W WO CONTRAST    CLINICAL HISTORY:  abnormal head CT;    TECHNIQUE:  Multiplanar multisequence MR imaging of the brain was performed before and after the administration of 7 mL Gadavist intravenous contrast.    COMPARISON:  Same-day CT    FINDINGS:  Significant motion artifact throughout all sequences.    There is restricted diffusion in the right frontal lobe cortex with associated T2 hyperintensity and mild sulcal effacement.    Ventricles and sulci are normal in size for age without evidence of hydrocephalus.  No intracranial blood products.  No extra-axial blood or fluid collections.  No definite abnormal enhancement.    Normal vascular flow voids are preserved.    Skull/Extracranial Contents (limited evaluation): Bone marrow signal intensity is normal.                               X-Ray Chest AP Portable (Final result)  Result time 09/11/20 13:58:07    Final result by Casey Stone MD (09/11/20 13:58:07)                 Impression:      No radiographic acute intrathoracic process seen on this single view.      Electronically  signed by: Casey Stone MD  Date:    09/11/2020  Time:    13:58             Narrative:    EXAMINATION:  XR CHEST AP PORTABLE    TECHNIQUE:  Single frontal view of the chest was performed.    COMPARISON:  None    FINDINGS:  Monitoring leads overlie the chest.  Patient is rotated.  Resolution is limited by body habitus with underpenetration.    Cardiomediastinal silhouette is midline and within normal limits for age.  The lungs are well expanded without large consolidation, pleural effusion or pneumothorax noting slight nonspecific elevation of the right hemidiaphragm.  Pulmonary vasculature and hilar contours are within normal limits.  No acute osseous process seen.  PA and lateral views can be obtained.                               X-Ray Abdomen AP 1 View (KUB) (Final result)  Result time 09/11/20 13:56:13    Final result by Adan Santos III, MD (09/11/20 13:56:13)                 Impression:      No acute process seen.      Electronically signed by: Adan Santos MD  Date:    09/11/2020  Time:    13:56             Narrative:    EXAMINATION:  XR ABDOMEN AP 1 VIEW    FINDINGS:  No obstruction, ileus, or perforation seen.  No trauma seen.                               CT Head Without Contrast (Final result)  Result time 09/11/20 09:55:45    Final result by Jose Carlos Penn MD (09/11/20 09:55:45)                 Impression:      1. Hypoattenuation involving the superior right frontal lobe with involvement of cortex and underlying white matter.  While this may represent acute or subacute ischemia, additional differential consideration given the imaging appearance and clinical history would include neoplastic, infectious, inflammatory processes.  As such, further evaluation contrast-enhanced brain MRI would be recommended for further evaluation.  2. No acute intracranial hemorrhage or midline shift.  This critical information above was relayed by myself by telephone to Dr. Hughes on 09/11/2020 at  09:50.      Electronically signed by: Jose Carlos Penn  Date:    09/11/2020  Time:    09:55             Narrative:    EXAMINATION:  CT HEAD WITHOUT CONTRAST    CLINICAL HISTORY:  Seizure, nontraumatic (Age => 41y);    TECHNIQUE:  Low dose axial images were obtained through the head.  Coronal and sagittal reformations were also performed. Contrast was not administered.    COMPARISON:  None.    FINDINGS:  There is hypoattenuation involving superomedial right frontal lobe.  There is some blurring of gray-white differentiation.  Hypoattenuation involve the underlying white matter.    There is no evidence of acute intracranial hemorrhage, mass effect, or midline shift.  Generalized age-related parenchymal volume loss is noted.  No evidence of hydrocephalus is seen.  No extra-axial fluid collection is appreciated.  Intracranial atherosclerosis.  No aggressive appearing osseous lesion.  Relatively minor paranasal sinus mucosal thickening is noted.  Leftward gaze deviation is appreciated.  Extracranial soft tissue structures unremarkable.                                 Medical Decision Making:   History:   Old Medical Records: I decided to obtain old medical records.  Old Records Summarized: records from previous admission(s).  Initial Assessment:   Donna Arias is a 60 y.o. female who is transferred here for EEG.  We will discuss with neuro critical care/ the Neurology team regarding management.  The EEG machines are in shortage currently, we will place a cap on and evaluate per neuro team. If in status, will likely end up in N[facility].  Also note, the patient did have a PEC from outside facility, unclear if still active.   Concerning this, we will not place new PEC.   Also, we will begin her Keppra again 1 g IV every 12 hr redosed again at 8:00 p.m..  Clinical Tests:   Lab Tests: Ordered and Reviewed  Radiological Study: Ordered and Reviewed  Medical Tests: Ordered and Reviewed              Attending Attestation:   Physician  Attestation Statement for Resident:  As the supervising MD   Physician Attestation Statement: I have personally seen and examined this patient.   I agree with the above history. -: 61 yo female transferred from OSH for EEG.   Previously in psychiatric facility for hallucinations, transferred to OSH for seizures.  Required transfer to this facility for EEG.   Unclear if patient has active PEC.    As the supervising MD I agree with the above PE.   -: Moves all extremities, answering questions by nodding her head, but occasional replies.  Pupils equal and reactive.    As the supervising MD I agree with the above treatment, course, plan, and disposition.   -: Neurology consulted for EEG.   Cap placed in ED and patient accepted for admission.  Psychiatry consulted by neuro team to delineate PEC status.   I have reviewed and agree with the residents interpretation of the following: lab data and CT scans.  I have reviewed the following: records from a referring facility and CT reports.                    ED Course as of Sep 12 2258   Fri Sep 11, 2020   0956 Discussed case with radiology. Paged vascular neurology.    [DM]   1003 I discussed the case with Dr. Leyva with vascular Neurology.  He agrees that patient is not currently a tPA candidate and recommends MRI as ordered.    [AK]   1307 I paged the hospitalist to discuss admission.    [AK]      ED Course User Index  [AK] Ingrid Hughes MD  [DM] Deandrane Major            Clinical Impression:       ICD-10-CM ICD-9-CM   1. New onset seizure with abnormal neurological exam without head trauma  R56.9 780.39    R29.90 781.99   2. Encounter for imaging to screen for metal prior to MRI  Z13.89 V82.89   3. Abnormal CT of brain  R90.89 793.0   4. Benzodiazepine causing adverse effect in therapeutic use, initial encounter  T42.4X5A E939.4   5. Hypokalemia  E87.6 276.8   6. Acute alteration in mental status  R41.82 780.97   7. New onset seizure  R56.9 780.39   8. Bilateral  sciatica  M54.31 724.3    M54.32    9. Cervical disc disease  M50.90 722.91   10. Severe episode of recurrent major depressive disorder, with psychotic features  F33.3 296.34   11. Seizure disorder  G40.909 345.90   12. Encephalitis  G04.90 323.9   13. Stroke  I63.9 434.91   14. Stroke  I63.9 434.91             ED Disposition Condition    Admit                             Noe Louie MD  09/12/20 9615

## 2020-09-12 NOTE — ED TRIAGE NOTES
Pt arrived by stretcher by EMS from [facility] for an EEG   Pt was seen at a [facility] and then went to [facility] for evaluation of focal seizures that has been happening for approx. 1 month   Sent from [facility] for EEG monitoring

## 2020-09-12 NOTE — ED NOTES
Received report from REYNALDO Bahena. Assumed care of patient at this time. Patient lying on stretcher with seizure precautions in place.  AAOx4 and appropriate then will having seizure and become disoriented. Restroom and comfort needs addressed. RR even and unlabored.  SRx2 up, bed in lowest position, call light within reach, ED sitter, Fatia at bedside.  Will continue to monitor.

## 2020-09-12 NOTE — ED NOTES
Pt stating her bilateral upper extremities (hands) are feeling a little numb, denies sensitivity changes. Pt states she feels my hands the same on both sides. Will continue to monitor.

## 2020-09-12 NOTE — ED NOTES
Notified NP plauche about >877 on the BS I performed. Awaiting orders. Will continue to monitor pt.

## 2020-09-12 NOTE — ED NOTES
Pt appears to be having focal seizure Pt turned head to left , eyes gazed to left for approx 30 seconds

## 2020-09-12 NOTE — ED NOTES
On assessment this morning, Pt IV was leaking and then became infiltrated. Removed pt IV and as I was at the bedside for a couple of minutes. Pt was able to communicate with me AAOx3, but kept drifting her head to the left and zoning out. Pt has had 4 episodes of seizure like activity that lasts seconds. The longest one was 20 seconds. After the seizures occur pt snaps out of them and then starts to talk to me. MD notified, awaiting orders and will continue to monitor.

## 2020-09-12 NOTE — ED NOTES
Hourly rounding on pt completed.  ED sitter Hermes, at bedside for direct patient observation performing q 15 min checks per psych protocol.   Pt laying flat on stretcher, seizure precautions in place, pt remains calm and cooperative, RR unlabored with equal bilateral expansion, Will continue to monitor

## 2020-09-12 NOTE — ED NOTES
EMS stated pt was having seizure like activity and her HR jumped up to 115. EMS stated it lasted a couple of seconds. Will notify NP and MD. Will continue to monitor.

## 2020-09-13 LAB
ABO + RH BLD: NORMAL
ALBUMIN SERPL BCP-MCNC: 3.5 G/DL (ref 3.5–5.2)
ALBUMIN SERPL BCP-MCNC: 3.6 G/DL (ref 3.5–5.2)
ALP SERPL-CCNC: 81 U/L (ref 55–135)
ALP SERPL-CCNC: 85 U/L (ref 55–135)
ALT SERPL W/O P-5'-P-CCNC: 23 U/L (ref 10–44)
ALT SERPL W/O P-5'-P-CCNC: 23 U/L (ref 10–44)
ANION GAP SERPL CALC-SCNC: 10 MMOL/L (ref 8–16)
ANION GAP SERPL CALC-SCNC: 11 MMOL/L (ref 8–16)
AST SERPL-CCNC: 19 U/L (ref 10–40)
AST SERPL-CCNC: 20 U/L (ref 10–40)
BASOPHILS # BLD AUTO: 0.08 K/UL (ref 0–0.2)
BASOPHILS # BLD AUTO: 0.08 K/UL (ref 0–0.2)
BASOPHILS NFR BLD: 0.9 % (ref 0–1.9)
BASOPHILS NFR BLD: 0.9 % (ref 0–1.9)
BILIRUB SERPL-MCNC: 0.4 MG/DL (ref 0.1–1)
BILIRUB SERPL-MCNC: 0.4 MG/DL (ref 0.1–1)
BLD GP AB SCN CELLS X3 SERPL QL: NORMAL
BUN SERPL-MCNC: 10 MG/DL (ref 6–20)
BUN SERPL-MCNC: 8 MG/DL (ref 6–20)
CALCIUM SERPL-MCNC: 8.5 MG/DL (ref 8.7–10.5)
CALCIUM SERPL-MCNC: 8.9 MG/DL (ref 8.7–10.5)
CHLORIDE SERPL-SCNC: 102 MMOL/L (ref 95–110)
CHLORIDE SERPL-SCNC: 103 MMOL/L (ref 95–110)
CHOLEST SERPL-MCNC: 145 MG/DL (ref 120–199)
CHOLEST/HDLC SERPL: 3 {RATIO} (ref 2–5)
CK MB SERPL-MCNC: 1.2 NG/ML (ref 0.1–6.5)
CK MB SERPL-RTO: 1.6 % (ref 0–5)
CK SERPL-CCNC: 75 U/L (ref 20–180)
CLARITY CSF: CLEAR
CO2 SERPL-SCNC: 26 MMOL/L (ref 23–29)
CO2 SERPL-SCNC: 29 MMOL/L (ref 23–29)
COLOR CSF: ABNORMAL
CREAT SERPL-MCNC: 0.6 MG/DL (ref 0.5–1.4)
CREAT SERPL-MCNC: 0.6 MG/DL (ref 0.5–1.4)
DIFFERENTIAL METHOD: ABNORMAL
DIFFERENTIAL METHOD: ABNORMAL
EOSINOPHIL # BLD AUTO: 0.1 K/UL (ref 0–0.5)
EOSINOPHIL # BLD AUTO: 0.1 K/UL (ref 0–0.5)
EOSINOPHIL NFR BLD: 1.3 % (ref 0–8)
EOSINOPHIL NFR BLD: 1.4 % (ref 0–8)
ERYTHROCYTE [DISTWIDTH] IN BLOOD BY AUTOMATED COUNT: 13.8 % (ref 11.5–14.5)
ERYTHROCYTE [DISTWIDTH] IN BLOOD BY AUTOMATED COUNT: 14 % (ref 11.5–14.5)
EST. GFR  (AFRICAN AMERICAN): >60 ML/MIN/1.73 M^2
EST. GFR  (AFRICAN AMERICAN): >60 ML/MIN/1.73 M^2
EST. GFR  (NON AFRICAN AMERICAN): >60 ML/MIN/1.73 M^2
EST. GFR  (NON AFRICAN AMERICAN): >60 ML/MIN/1.73 M^2
ESTIMATED AVG GLUCOSE: 117 MG/DL (ref 68–131)
GLUCOSE CSF-MCNC: 50 MG/DL (ref 40–70)
GLUCOSE SERPL-MCNC: 100 MG/DL (ref 70–110)
GLUCOSE SERPL-MCNC: 102 MG/DL (ref 70–110)
HBA1C MFR BLD HPLC: 5.7 % (ref 4–5.6)
HCT VFR BLD AUTO: 34.2 % (ref 37–48.5)
HCT VFR BLD AUTO: 37.4 % (ref 37–48.5)
HDLC SERPL-MCNC: 48 MG/DL (ref 40–75)
HDLC SERPL: 33.1 % (ref 20–50)
HGB BLD-MCNC: 11.1 G/DL (ref 12–16)
HGB BLD-MCNC: 11.6 G/DL (ref 12–16)
IMM GRANULOCYTES # BLD AUTO: 0.01 K/UL (ref 0–0.04)
IMM GRANULOCYTES # BLD AUTO: 0.03 K/UL (ref 0–0.04)
IMM GRANULOCYTES NFR BLD AUTO: 0.1 % (ref 0–0.5)
IMM GRANULOCYTES NFR BLD AUTO: 0.3 % (ref 0–0.5)
INR PPP: 1 (ref 0.8–1.2)
INR PPP: 1 (ref 0.8–1.2)
LDLC SERPL CALC-MCNC: 85.2 MG/DL (ref 63–159)
LYMPHOCYTES # BLD AUTO: 1.8 K/UL (ref 1–4.8)
LYMPHOCYTES # BLD AUTO: 2 K/UL (ref 1–4.8)
LYMPHOCYTES NFR BLD: 19.7 % (ref 18–48)
LYMPHOCYTES NFR BLD: 21.9 % (ref 18–48)
LYMPHOCYTES NFR CSF MANUAL: 46 % (ref 40–80)
MAGNESIUM SERPL-MCNC: 2 MG/DL (ref 1.6–2.6)
MAGNESIUM SERPL-MCNC: 2.1 MG/DL (ref 1.6–2.6)
MCH RBC QN AUTO: 28.7 PG (ref 27–31)
MCH RBC QN AUTO: 29.5 PG (ref 27–31)
MCHC RBC AUTO-ENTMCNC: 31 G/DL (ref 32–36)
MCHC RBC AUTO-ENTMCNC: 32.5 G/DL (ref 32–36)
MCV RBC AUTO: 91 FL (ref 82–98)
MCV RBC AUTO: 93 FL (ref 82–98)
MONOCYTES # BLD AUTO: 0.5 K/UL (ref 0.3–1)
MONOCYTES # BLD AUTO: 0.6 K/UL (ref 0.3–1)
MONOCYTES NFR BLD: 5.4 % (ref 4–15)
MONOCYTES NFR BLD: 6.1 % (ref 4–15)
MONOS+MACROS NFR CSF MANUAL: 7 % (ref 15–45)
NEUTROPHILS # BLD AUTO: 6.5 K/UL (ref 1.8–7.7)
NEUTROPHILS # BLD AUTO: 6.5 K/UL (ref 1.8–7.7)
NEUTROPHILS NFR BLD: 69.6 % (ref 38–73)
NEUTROPHILS NFR BLD: 72.4 % (ref 38–73)
NEUTROPHILS NFR CSF MANUAL: 47 % (ref 0–6)
NONHDLC SERPL-MCNC: 97 MG/DL
NRBC BLD-RTO: 0 /100 WBC
NRBC BLD-RTO: 0 /100 WBC
PHOSPHATE SERPL-MCNC: 3 MG/DL (ref 2.7–4.5)
PHOSPHATE SERPL-MCNC: 3.5 MG/DL (ref 2.7–4.5)
PLATELET # BLD AUTO: 327 K/UL (ref 150–350)
PLATELET # BLD AUTO: 330 K/UL (ref 150–350)
PMV BLD AUTO: 9.3 FL (ref 9.2–12.9)
PMV BLD AUTO: 9.5 FL (ref 9.2–12.9)
POCT GLUCOSE: 88 MG/DL (ref 70–110)
POTASSIUM SERPL-SCNC: 2.7 MMOL/L (ref 3.5–5.1)
POTASSIUM SERPL-SCNC: 2.8 MMOL/L (ref 3.5–5.1)
POTASSIUM SERPL-SCNC: 3.4 MMOL/L (ref 3.5–5.1)
PROT SERPL-MCNC: 6.3 G/DL (ref 6–8.4)
PROT SERPL-MCNC: 6.4 G/DL (ref 6–8.4)
PROTHROMBIN TIME: 11.2 SEC (ref 9–12.5)
PROTHROMBIN TIME: 11.2 SEC (ref 9–12.5)
RBC # BLD AUTO: 3.76 M/UL (ref 4–5.4)
RBC # BLD AUTO: 4.04 M/UL (ref 4–5.4)
RBC # CSF: 2500 /CU MM
SODIUM SERPL-SCNC: 140 MMOL/L (ref 136–145)
SODIUM SERPL-SCNC: 141 MMOL/L (ref 136–145)
SPECIMEN VOL CSF: 3 ML
TRIGL SERPL-MCNC: 59 MG/DL (ref 30–150)
TROPONIN I SERPL DL<=0.01 NG/ML-MCNC: <0.006 NG/ML (ref 0–0.03)
WBC # BLD AUTO: 9.02 K/UL (ref 3.9–12.7)
WBC # BLD AUTO: 9.31 K/UL (ref 3.9–12.7)
WBC # CSF: 5 /CU MM (ref 0–5)

## 2020-09-13 PROCEDURE — 62270 PR SPINAL PUNCTURE,LUMBAR,DIAGNOSTIC: ICD-10-PCS | Mod: ,,, | Performed by: PSYCHIATRY & NEUROLOGY

## 2020-09-13 PROCEDURE — 63600175 PHARM REV CODE 636 W HCPCS: Performed by: STUDENT IN AN ORGANIZED HEALTH CARE EDUCATION/TRAINING PROGRAM

## 2020-09-13 PROCEDURE — 95700 EEG CONT REC W/VID EEG TECH: CPT

## 2020-09-13 PROCEDURE — C9254 INJECTION, LACOSAMIDE: HCPCS | Performed by: NURSE PRACTITIONER

## 2020-09-13 PROCEDURE — 20000000 HC ICU ROOM

## 2020-09-13 PROCEDURE — 87483 CNS DNA AMP PROBE TYPE 12-25: CPT

## 2020-09-13 PROCEDURE — 86403 PARTICLE AGGLUT ANTBDY SCRN: CPT

## 2020-09-13 PROCEDURE — 95720 PR EEG, W/VIDEO, CONT RECORD, I&R, >12<26 HRS: ICD-10-PCS | Mod: ,,, | Performed by: PSYCHIATRY & NEUROLOGY

## 2020-09-13 PROCEDURE — 63600175 PHARM REV CODE 636 W HCPCS: Performed by: NURSE PRACTITIONER

## 2020-09-13 PROCEDURE — 82945 GLUCOSE OTHER FLUID: CPT

## 2020-09-13 PROCEDURE — 85610 PROTHROMBIN TIME: CPT | Mod: 91

## 2020-09-13 PROCEDURE — 99233 SBSQ HOSP IP/OBS HIGH 50: CPT | Mod: ,,, | Performed by: PSYCHIATRY & NEUROLOGY

## 2020-09-13 PROCEDURE — 62270 DX LMBR SPI PNXR: CPT | Mod: ,,, | Performed by: PSYCHIATRY & NEUROLOGY

## 2020-09-13 PROCEDURE — 94761 N-INVAS EAR/PLS OXIMETRY MLT: CPT

## 2020-09-13 PROCEDURE — 80053 COMPREHEN METABOLIC PANEL: CPT

## 2020-09-13 PROCEDURE — 87070 CULTURE OTHR SPECIMN AEROBIC: CPT

## 2020-09-13 PROCEDURE — 83735 ASSAY OF MAGNESIUM: CPT | Mod: 91

## 2020-09-13 PROCEDURE — 86788 WEST NILE VIRUS AB IGM: CPT

## 2020-09-13 PROCEDURE — 82550 ASSAY OF CK (CPK): CPT

## 2020-09-13 PROCEDURE — 25000003 PHARM REV CODE 250: Performed by: NURSE PRACTITIONER

## 2020-09-13 PROCEDURE — 87205 SMEAR GRAM STAIN: CPT

## 2020-09-13 PROCEDURE — A4216 STERILE WATER/SALINE, 10 ML: HCPCS | Performed by: NURSE PRACTITIONER

## 2020-09-13 PROCEDURE — 89051 BODY FLUID CELL COUNT: CPT

## 2020-09-13 PROCEDURE — 85025 COMPLETE CBC W/AUTO DIFF WBC: CPT | Mod: 91

## 2020-09-13 PROCEDURE — 87529 HSV DNA AMP PROBE: CPT

## 2020-09-13 PROCEDURE — 95714 VEEG EA 12-26 HR UNMNTR: CPT

## 2020-09-13 PROCEDURE — 80061 LIPID PANEL: CPT

## 2020-09-13 PROCEDURE — 90792 PR PSYCHIATRIC DIAGNOSTIC EVALUATION W/MEDICAL SERVICES: ICD-10-PCS | Mod: ,,, | Performed by: PSYCHIATRY & NEUROLOGY

## 2020-09-13 PROCEDURE — 85610 PROTHROMBIN TIME: CPT

## 2020-09-13 PROCEDURE — 95720 EEG PHY/QHP EA INCR W/VEEG: CPT | Mod: ,,, | Performed by: PSYCHIATRY & NEUROLOGY

## 2020-09-13 PROCEDURE — 84484 ASSAY OF TROPONIN QUANT: CPT

## 2020-09-13 PROCEDURE — 86850 RBC ANTIBODY SCREEN: CPT

## 2020-09-13 PROCEDURE — 84100 ASSAY OF PHOSPHORUS: CPT

## 2020-09-13 PROCEDURE — 84132 ASSAY OF SERUM POTASSIUM: CPT

## 2020-09-13 PROCEDURE — 80053 COMPREHEN METABOLIC PANEL: CPT | Mod: 91

## 2020-09-13 PROCEDURE — 83735 ASSAY OF MAGNESIUM: CPT

## 2020-09-13 PROCEDURE — 84157 ASSAY OF PROTEIN OTHER: CPT

## 2020-09-13 PROCEDURE — 83036 HEMOGLOBIN GLYCOSYLATED A1C: CPT

## 2020-09-13 PROCEDURE — 90792 PSYCH DIAG EVAL W/MED SRVCS: CPT | Mod: ,,, | Performed by: PSYCHIATRY & NEUROLOGY

## 2020-09-13 PROCEDURE — 84100 ASSAY OF PHOSPHORUS: CPT | Mod: 91

## 2020-09-13 PROCEDURE — 82553 CREATINE MB FRACTION: CPT

## 2020-09-13 PROCEDURE — 99233 PR SUBSEQUENT HOSPITAL CARE,LEVL III: ICD-10-PCS | Mod: ,,, | Performed by: PSYCHIATRY & NEUROLOGY

## 2020-09-13 RX ORDER — MAGNESIUM SULFATE HEPTAHYDRATE 40 MG/ML
4 INJECTION, SOLUTION INTRAVENOUS
Status: DISCONTINUED | OUTPATIENT
Start: 2020-09-13 | End: 2020-09-18

## 2020-09-13 RX ORDER — POTASSIUM CHLORIDE 7.45 MG/ML
10 INJECTION INTRAVENOUS
Status: DISCONTINUED | OUTPATIENT
Start: 2020-09-13 | End: 2020-09-15

## 2020-09-13 RX ORDER — LORAZEPAM 2 MG/ML
2 INJECTION INTRAMUSCULAR
Status: DISCONTINUED | OUTPATIENT
Start: 2020-09-13 | End: 2020-09-24 | Stop reason: HOSPADM

## 2020-09-13 RX ORDER — MAGNESIUM SULFATE HEPTAHYDRATE 40 MG/ML
2 INJECTION, SOLUTION INTRAVENOUS
Status: DISCONTINUED | OUTPATIENT
Start: 2020-09-13 | End: 2020-09-18

## 2020-09-13 RX ORDER — POTASSIUM CHLORIDE 7.45 MG/ML
40 INJECTION INTRAVENOUS
Status: DISCONTINUED | OUTPATIENT
Start: 2020-09-13 | End: 2020-09-15

## 2020-09-13 RX ORDER — POTASSIUM CHLORIDE 7.45 MG/ML
60 INJECTION INTRAVENOUS
Status: DISCONTINUED | OUTPATIENT
Start: 2020-09-13 | End: 2020-09-15

## 2020-09-13 RX ADMIN — SERTRALINE 25 MG: 25 TABLET, FILM COATED ORAL at 09:09

## 2020-09-13 RX ADMIN — SODIUM CHLORIDE: 0.9 INJECTION, SOLUTION INTRAVENOUS at 09:09

## 2020-09-13 RX ADMIN — HEPARIN SODIUM 5000 UNITS: 5000 INJECTION INTRAVENOUS; SUBCUTANEOUS at 05:09

## 2020-09-13 RX ADMIN — POTASSIUM CHLORIDE 60 MEQ: 1.5 POWDER, FOR SOLUTION ORAL at 06:09

## 2020-09-13 RX ADMIN — POTASSIUM CHLORIDE 60 MEQ: 7.46 INJECTION, SOLUTION INTRAVENOUS at 06:09

## 2020-09-13 RX ADMIN — Medication 3 ML: at 05:09

## 2020-09-13 RX ADMIN — LEVETIRACETAM INJECTION 1000 MG: 10 INJECTION INTRAVENOUS at 09:09

## 2020-09-13 RX ADMIN — SODIUM CHLORIDE 200 MG: 0.9 INJECTION, SOLUTION INTRAVENOUS at 01:09

## 2020-09-13 RX ADMIN — ASPIRIN 81 MG: 81 TABLET, COATED ORAL at 09:09

## 2020-09-13 RX ADMIN — SODIUM CHLORIDE 200 MG: 0.9 INJECTION, SOLUTION INTRAVENOUS at 02:09

## 2020-09-13 NOTE — SUBJECTIVE & OBJECTIVE
Psychiatric Review Of Systems - Is patient experiencing or having changes in:  sleep: yes  appetite: yes  weight: yes  energy/anergy: yes  interest/pleasure/anhedonia: yes  somatic symptoms: yes  guilty/hopelessness: yes  concentration: yes  S.I.B.s/risky behavior: no  SI/SA:  no     anxiety/panic: yes  Agoraphobia:  no  Social phobia:  no  Recurrent nightmares:  no  hyper startle response:  no  Avoidance: yes  Recurrent thoughts:  no  Recurrent behaviors:  no     Irritability: no  Racing thoughts: yes  Impulsive behaviors: no  Pressured speech:  no     Paranoia:no  Delusions: no  AVH: intermittent, but denies current     Psychiatric History:  Diagnose(s): Depression, Anxiety  Previous Medication Trials: Zoloft, Effexor,Xanax   Previous Psychiatric Hospitalizations: Yes   Previous Suicide Attempts: No  History of Violence: No  Outpatient Psychiatrist: Yes - Dr. Duffy     Social History:  Marital Status: single  Children: 0   Employment Status: retired  Education: high school diploma/GED  Special Ed: no   History: no  Housing Status: Lives in [facility]  Developmental History: No  History of Abuse: No  Access to Gun: Yes      Substance Abuse History:  Recreational Drugs: Denies  Use of Alcohol: Occasional use  Rehab History: No  Tobacco Use: No  Use of Caffeine: No  Legal consequences of chemical use: No  Is the patient aware of the biomedical complications associated with substance abuse and mental illness? Yes      Legal History:  Past Charges/Incarcerations: No  Pending Charges: No     Family Psychiatric History:   No     Psychosocial Factors:  Psychosocial Stressors: health.   Functioning Relationships: good relationship with brother     Collateral:   See HPI     Medical Review Of Systems:  Pertinent items noted in HPI     Scheduled Meds:   aspirin  81 mg Oral Daily    atorvastatin  40 mg Oral Daily    cloBAZam  10 mg Oral Daily    docusate sodium  100 mg Oral BID    heparin (porcine)   5,000 Units Subcutaneous Q8H    [START ON 9/13/2020] lacosamide (VIMPAT) IVPB  200 mg Intravenous Q12H    levetiracetam IVPB  1,000 mg Intravenous Q12H    multivitamin  1 tablet Oral Daily    sertraline  25 mg Oral Daily    sodium chloride 0.9%  3 mL Intravenous Q8H      acetaminophen, acetaminophen, labetalol, magnesium oxide, magnesium oxide, ondansetron, potassium chloride, potassium chloride, potassium chloride, potassium, sodium phosphates, potassium, sodium phosphates, potassium, sodium phosphates, sodium chloride 0.9%             Psychotherapeutics (From admission, onward)     Start     Stop Route Frequency Ordered     09/12/20 0900   sertraline tablet 25 mg      -- Oral Daily 09/11/20 1915          PRN Meds:  acetaminophen, acetaminophen, labetalol, magnesium oxide, magnesium oxide, ondansetron, potassium chloride, potassium chloride, potassium chloride, potassium, sodium phosphates, potassium, sodium phosphates, potassium, sodium phosphates, sodium chloride 0.9%  Home Meds:          Prior to Admission medications    Medication Sig Start Date End Date Taking? Authorizing Provider   gabapentin (NEURONTIN) 100 MG capsule Take 100 mg by mouth 3 (three) times daily.       Historical Provider   multivitamin (ONE DAILY MULTIVITAMIN) per tablet Take 1 tablet by mouth once daily.       Historical Provider   QUEtiapine (SEROQUEL) 25 MG Tab Take 12.5 mg by mouth every evening.       Historical Provider   sertraline (ZOLOFT) 25 MG tablet Take 25 mg by mouth once daily.       Historical Provider      Allergies:  Benadryl [diphenhydramine hcl], Penicillins, and Prednisone  Past Medical/Surgical History:       Past Medical History:   Diagnosis Date    Anxiety      Depression      Hypertension        History reviewed. No pertinent surgical history.    Past Medical History:   Diagnosis Date    Anxiety     Depression     Hypertension      History reviewed. No pertinent surgical history.  Family History     Problem  "Relation (Age of Onset)    Epilepsy Mother    No Known Problems Father        Tobacco Use    Smoking status: Current Every Day Smoker     Types: Cigarettes   Substance and Sexual Activity    Alcohol use: Yes    Drug use: Never    Sexual activity: Not Currently     Review of patient's allergies indicates:   Allergen Reactions    Benadryl [diphenhydramine hcl]     Penicillins Other (See Comments)     unknown    Prednisone        No current facility-administered medications on file prior to encounter.      Current Outpatient Medications on File Prior to Encounter   Medication Sig    gabapentin (NEURONTIN) 100 MG capsule Take 100 mg by mouth 3 (three) times daily.    multivitamin (ONE DAILY MULTIVITAMIN) per tablet Take 1 tablet by mouth once daily.    QUEtiapine (SEROQUEL) 25 MG Tab Take 12.5 mg by mouth every evening.    sertraline (ZOLOFT) 25 MG tablet Take 25 mg by mouth once daily.     Psychotherapeutics (From admission, onward)    Start     Stop Route Frequency Ordered    09/12/20 0900  sertraline tablet 25 mg      -- Oral Daily 09/11/20 1915          Objective:     Vital Signs (Most Recent):  Temp: 97.6 °F (36.4 °C) (09/13/20 0849)  Pulse: 102 (09/13/20 1102)  Resp: (!) 24 (09/13/20 1101)  BP: (!) 157/77 (09/13/20 1102)  SpO2: 99 % (09/13/20 1102) Vital Signs (24h Range):  Temp:  [97.6 °F (36.4 °C)-99 °F (37.2 °C)] 97.6 °F (36.4 °C)  Pulse:  [] 102  Resp:  [15-24] 24  SpO2:  [95 %-100 %] 99 %  BP: (111-183)/(57-94) 157/77     Height: 5' 2" (157.5 cm)  Weight: 72.6 kg (160 lb)  Body mass index is 29.26 kg/m².      Intake/Output Summary (Last 24 hours) at 9/13/2020 1340  Last data filed at 9/13/2020 1100  Gross per 24 hour   Intake 400 ml   Output 2050 ml   Net -1650 ml     OBJECTIVE      Vital Signs:  Temp:  [98.1 °F (36.7 °C)-99 °F (37.2 °C)]   Pulse:  []   Resp:  [16-36]   BP: (111-168)/()   SpO2:  [94 %-100 %]      Modified CAM-ICU:  1. Acute change and/or fluctuating course of " "mental status: Yes  2. Inattention (SAVEAHAART): Yes  ? "Squeeze my hand, only when you hear, the letter 'A'."  3. Altered Level of Consciousness: No  4. Disorganized Thinking (Errors >1/6): No  ? "Will a stone float on water?"  ? "Are there fish in the sea?"  ? "Does one pound weigh more than two?"  ? "Can you use a hammer to pound a nail?"  ? Command(s):  § "Hold up 2 fingers."  § "Now do the same thing with the other hand."     Score: 1+2 AND, either 3 or 4 present = Positive CAM-ICU     Mental Status Exam:  Appearance: unremarkable, age appropriate  Level of Consciousness: Alert and awake  Behavior/Cooperation: normal, cooperative  Psychomotor: psychomotor retardation   Speech: normal tone, normal pitch, normal volume, slowed, delayed  Language: english, fluid  Orientation: person, place, situation, time/date, year  Attention Span/Concentration: Spelled HOUSE forwards  Memory: Registers and recalls 3/3 objects at 5 minutes  Mood: "Sad"  Affect: blunted  Thought Process: linear, normal and logical  Associations: normal and logical  Thought Content: no suicidality, no homicidality, delusions, or paranoia, Reports some AH of "voices" but does not tell what they are  Fund of Knowledge: Aware of current events  Abstraction: Unable to assess  Insight: Limited  Judgment: Limited     Laboratory Data:  Recent Results         Recent Results (from the past 48 hour(s))   POCT glucose     Collection Time: 09/11/20  9:07 AM   Result Value Ref Range     POCT Glucose 127 (H) 70 - 110 mg/dL   CBC auto differential     Collection Time: 09/11/20  9:15 AM   Result Value Ref Range     WBC 10.23 3.90 - 12.70 K/uL     RBC 3.92 (L) 4.00 - 5.40 M/uL     Hemoglobin 11.6 (L) 12.0 - 16.0 g/dL     Hematocrit 34.8 (L) 37.0 - 48.5 %     Mean Corpuscular Volume 89 82 - 98 fL     Mean Corpuscular Hemoglobin 29.6 27.0 - 31.0 pg     Mean Corpuscular Hemoglobin Conc 33.3 32.0 - 36.0 g/dL     RDW 14.3 11.5 - 14.5 %     Platelets 328 150 - 350 K/uL "     MPV 9.6 9.2 - 12.9 fL     Immature Granulocytes 0.4 0.0 - 0.5 %     Gran # (ANC) 8.6 (H) 1.8 - 7.7 K/uL     Immature Grans (Abs) 0.04 0.00 - 0.04 K/uL     Lymph # 1.0 1.0 - 4.8 K/uL     Mono # 0.5 0.3 - 1.0 K/uL     Eos # 0.0 0.0 - 0.5 K/uL     Baso # 0.05 0.00 - 0.20 K/uL     nRBC 0 0 /100 WBC     Gran% 84.3 (H) 38.0 - 73.0 %     Lymph% 9.6 (L) 18.0 - 48.0 %     Mono% 4.9 4.0 - 15.0 %     Eosinophil% 0.3 0.0 - 8.0 %     Basophil% 0.5 0.0 - 1.9 %     Differential Method Automated     Comprehensive metabolic panel     Collection Time: 09/11/20  9:15 AM   Result Value Ref Range     Sodium 137 136 - 145 mmol/L     Potassium 2.9 (L) 3.5 - 5.1 mmol/L     Chloride 98 95 - 110 mmol/L     CO2 25 23 - 29 mmol/L     Glucose 123 (H) 70 - 110 mg/dL     BUN, Bld 14 6 - 20 mg/dL     Creatinine 0.7 0.5 - 1.4 mg/dL     Calcium 9.0 8.7 - 10.5 mg/dL     Total Protein 6.7 6.0 - 8.4 g/dL     Albumin 3.8 3.5 - 5.2 g/dL     Total Bilirubin 0.3 0.1 - 1.0 mg/dL     Alkaline Phosphatase 88 55 - 135 U/L     AST 25 10 - 40 U/L     ALT 25 10 - 44 U/L     Anion Gap 14 8 - 16 mmol/L     eGFR if African American >60 >60 mL/min/1.73 m^2     eGFR if non African American >60 >60 mL/min/1.73 m^2   Magnesium     Collection Time: 09/11/20  9:15 AM   Result Value Ref Range     Magnesium 1.9 1.6 - 2.6 mg/dL   Phosphorus     Collection Time: 09/11/20  9:15 AM   Result Value Ref Range     Phosphorus 2.8 2.7 - 4.5 mg/dL   POCT COVID-19 Rapid Screening     Collection Time: 09/11/20  4:45 PM   Result Value Ref Range     POC Rapid COVID Negative Negative      Acceptable Yes     Basic metabolic panel     Collection Time: 09/11/20  5:16 PM   Result Value Ref Range     Sodium 138 136 - 145 mmol/L     Potassium 3.1 (L) 3.5 - 5.1 mmol/L     Chloride 98 95 - 110 mmol/L     CO2 27 23 - 29 mmol/L     Glucose 105 70 - 110 mg/dL     BUN, Bld 12 6 - 20 mg/dL     Creatinine 0.7 0.5 - 1.4 mg/dL     Calcium 9.3 8.7 - 10.5 mg/dL     Anion Gap 13 8 - 16  mmol/L     eGFR if African American >60 >60 mL/min/1.73 m^2     eGFR if non African American >60 >60 mL/min/1.73 m^2   Urinalysis, Reflex to Urine Culture Urine, Clean Catch     Collection Time: 09/11/20  7:09 PM     Specimen: Urine   Result Value Ref Range     Specimen UA Urine, Catheterized       Color, UA Yellow Yellow, Straw, Jennifer     Appearance, UA Clear Clear     pH, UA 6.0 5.0 - 8.0     Specific Gravity, UA 1.015 1.005 - 1.030     Protein, UA Negative Negative     Glucose, UA Negative Negative     Ketones, UA Negative Negative     Bilirubin (UA) Negative Negative     Occult Blood UA Negative Negative     Nitrite, UA Negative Negative     Urobilinogen, UA Negative <2.0 EU/dL     Leukocytes, UA Negative Negative   Toxicology screen, urine     Collection Time: 09/11/20  7:09 PM   Result Value Ref Range     Alcohol, Urine <10 <10 mg/dL     Benzodiazepines Presumptive Positive       Methadone metabolites Negative       Cocaine (Metab.) Negative       Opiate Scrn, Ur Negative       Barbiturate Screen, Ur Negative       Amphetamine Screen, Ur Negative       THC Negative       Phencyclidine Negative       Creatinine, Random Ur 64.7 15.0 - 325.0 mg/dL     Toxicology Information SEE COMMENT     Comprehensive metabolic panel     Collection Time: 09/12/20  6:30 AM   Result Value Ref Range     Sodium 137 136 - 145 mmol/L     Potassium 2.8 (L) 3.5 - 5.1 mmol/L     Chloride 99 95 - 110 mmol/L     CO2 28 23 - 29 mmol/L     Glucose 109 70 - 110 mg/dL     BUN, Bld 11 6 - 20 mg/dL     Creatinine 0.6 0.5 - 1.4 mg/dL     Calcium 8.9 8.7 - 10.5 mg/dL     Total Protein 6.4 6.0 - 8.4 g/dL     Albumin 3.6 3.5 - 5.2 g/dL     Total Bilirubin 0.4 0.1 - 1.0 mg/dL     Alkaline Phosphatase 84 55 - 135 U/L     AST 23 10 - 40 U/L     ALT 26 10 - 44 U/L     Anion Gap 10 8 - 16 mmol/L     eGFR if African American >60 >60 mL/min/1.73 m^2     eGFR if non African American >60 >60 mL/min/1.73 m^2   CBC auto differential     Collection Time:  09/12/20  6:30 AM   Result Value Ref Range     WBC 9.62 3.90 - 12.70 K/uL     RBC 3.89 (L) 4.00 - 5.40 M/uL     Hemoglobin 11.2 (L) 12.0 - 16.0 g/dL     Hematocrit 34.7 (L) 37.0 - 48.5 %     Mean Corpuscular Volume 89 82 - 98 fL     Mean Corpuscular Hemoglobin 28.8 27.0 - 31.0 pg     Mean Corpuscular Hemoglobin Conc 32.3 32.0 - 36.0 g/dL     RDW 13.9 11.5 - 14.5 %     Platelets 337 150 - 350 K/uL     MPV 8.8 (L) 9.2 - 12.9 fL     Immature Granulocytes 0.3 0.0 - 0.5 %     Gran # (ANC) 7.3 1.8 - 7.7 K/uL     Immature Grans (Abs) 0.03 0.00 - 0.04 K/uL     Lymph # 1.4 1.0 - 4.8 K/uL     Mono # 0.7 0.3 - 1.0 K/uL     Eos # 0.1 0.0 - 0.5 K/uL     Baso # 0.07 0.00 - 0.20 K/uL     nRBC 0 0 /100 WBC     Gran% 76.2 (H) 38.0 - 73.0 %     Lymph% 14.8 (L) 18.0 - 48.0 %     Mono% 7.1 4.0 - 15.0 %     Eosinophil% 0.9 0.0 - 8.0 %     Basophil% 0.7 0.0 - 1.9 %     Differential Method Automated     TSH     Collection Time: 09/12/20  6:30 AM   Result Value Ref Range     TSH 1.100 0.400 - 4.000 uIU/mL   T4, free     Collection Time: 09/12/20  6:30 AM   Result Value Ref Range     Free T4 1.32 0.71 - 1.51 ng/dL   Protime-INR     Collection Time: 09/12/20 11:51 AM   Result Value Ref Range     Prothrombin Time 11.2 9.0 - 12.5 sec     INR 1.0 0.8 - 1.2   APTT     Collection Time: 09/12/20 11:51 AM   Result Value Ref Range     aPTT 26.2 21.0 - 32.0 sec   POCT glucose     Collection Time: 09/12/20 11:54 AM   Result Value Ref Range     POCT Glucose 136 (H) 70 - 110 mg/dL   Thyroid peroxidase antibody     Collection Time: 09/12/20 12:23 PM   Result Value Ref Range     Thyroperoxidase Antibodies <6.0 <6.0 IU/mL   Anti-thyroglobulin antibody     Collection Time: 09/12/20 12:23 PM   Result Value Ref Range     Thyroglobulin Ab Screen <4.0 0.0 - 3.9 IU/mL   Vitamin B12     Collection Time: 09/12/20  2:51 PM   Result Value Ref Range     Vitamin B-12 >2000 (H) 210 - 950 pg/mL   Basic metabolic panel     Collection Time: 09/12/20  2:51 PM   Result  Value Ref Range     Sodium 138 136 - 145 mmol/L     Potassium 3.3 (L) 3.5 - 5.1 mmol/L     Chloride 98 95 - 110 mmol/L     CO2 26 23 - 29 mmol/L     Glucose 107 70 - 110 mg/dL     BUN, Bld 13 6 - 20 mg/dL     Creatinine 0.7 0.5 - 1.4 mg/dL     Calcium 9.2 8.7 - 10.5 mg/dL     Anion Gap 14 8 - 16 mmol/L     eGFR if African American >60 >60 mL/min/1.73 m^2     eGFR if non African American >60 >60 mL/min/1.73 m^2   POCT glucose     Collection Time: 09/12/20  8:16 PM   Result Value Ref Range     POCT Glucose 102 70 - 110 mg/dL   Drug screen panel, emergency     Collection Time: 09/12/20  8:17 PM   Result Value Ref Range     Benzodiazepines Presumptive Positive       Methadone metabolites Negative       Cocaine (Metab.) Negative       Opiate Scrn, Ur Negative       Barbiturate Screen, Ur Negative       Amphetamine Screen, Ur Negative       THC Negative       Phencyclidine Negative       Creatinine, Random Ur 98.0 15.0 - 325.0 mg/dL     Toxicology Information SEE COMMENT     Urinalysis, Reflex to Urine Culture Urine, Clean Catch     Collection Time: 09/12/20  8:18 PM     Specimen: Urine   Result Value Ref Range     Specimen UA Urine, Catheterized       Color, UA Yellow Yellow, Straw, Jennifer     Appearance, UA Hazy (A) Clear     pH, UA 6.0 5.0 - 8.0     Specific Gravity, UA 1.020 1.005 - 1.030     Protein, UA Negative Negative     Glucose, UA Negative Negative     Ketones, UA Trace (A) Negative     Bilirubin (UA) Negative Negative     Occult Blood UA 2+ (A) Negative     Nitrite, UA Negative Negative     Leukocytes, UA Negative Negative   Urinalysis Microscopic     Collection Time: 09/12/20  8:18 PM   Result Value Ref Range     RBC, UA 13 (H) 0 - 4 /hpf     WBC, UA 3 0 - 5 /hpf     Bacteria Rare None-Occ /hpf     Squam Epithel, UA 1 /hpf     Microscopic Comment SEE COMMENT           No results found for: PHENYTOIN, PHENOBARB, VALPROATE, CBMZ  Imaging:      Imaging Results                   X-Ray Chest AP Single View (Final  result)  Result time 09/12/20 20:07:09                Final result by Casey Stone MD (09/12/20 20:07:09)                               Impression:        No detrimental change or radiographic acute intrathoracic process seen.        Electronically signed by:     Casey Stone MD  Date:                                            09/12/2020  Time:                                            20:07                         Narrative:     EXAMINATION:  XR CHEST 1 VIEW     CLINICAL HISTORY:  stroke;     TECHNIQUE:  Single frontal view of the chest was performed.     COMPARISON:  Chest radiograph 09/11/2020     FINDINGS:  Monitoring leads overlie the chest.  Large body habitus.  No detrimental change.  Trachea is midline and patent.  Cardiomediastinal silhouette is midline and within normal limits.  Pulmonary vasculature and hilar contours are within normal limits.  The lungs are symmetrically well expanded and clear.  No pleural effusion or pneumothorax.  Osseous structures appear intact.                                           MRI Brain W WO Contrast (Edited Result - FINAL)  Result time 09/12/20 10:47:08   Procedure changed from MRI Brain With Contrast                 Addendum 1 of 1 by Bravo Novak MD (09/12/20 10:47:08)        Along with acute infarction, encephalitis should also be considered in the differential.        Electronically signed by:       Bravo Novak MD  Date:                                        09/12/2020  Time:                                       10:47                            Final result by Bravo Novak MD (09/11/20 16:58:30)                               Impression:        Restricted diffusion and edema throughout the right frontal lobe with sulcal effacement.  Findings concerning for acute infarction in the right CESILIA territory.  No definite underlying mass lesion or abnormal postcontrast enhancement, however, there is significant motion artifact which limits  sensitivity.        Electronically signed by:     Bravo Novak MD  Date:                                            09/11/2020  Time:                                            16:58                         Narrative:     EXAMINATION:  MRI BRAIN W WO CONTRAST     CLINICAL HISTORY:  abnormal head CT;     TECHNIQUE:  Multiplanar multisequence MR imaging of the brain was performed before and after the administration of 7 mL Gadavist intravenous contrast.     COMPARISON:  Same-day CT     FINDINGS:  Significant motion artifact throughout all sequences.     There is restricted diffusion in the right frontal lobe cortex with associated T2 hyperintensity and mild sulcal effacement.     Ventricles and sulci are normal in size for age without evidence of hydrocephalus.  No intracranial blood products.  No extra-axial blood or fluid collections.  No definite abnormal enhancement.     Normal vascular flow voids are preserved.     Skull/Extracranial Contents (limited evaluation): Bone marrow signal intensity is normal.                                                  X-Ray Chest AP Portable (Final result)  Result time 09/11/20 13:58:07      Final result by Casey Stone MD (09/11/20 13:58:07)                               Impression:        No radiographic acute intrathoracic process seen on this single view.        Electronically signed by:     Casey Stone MD  Date:                                            09/11/2020  Time:                                            13:58                         Narrative:     EXAMINATION:  XR CHEST AP PORTABLE     TECHNIQUE:  Single frontal view of the chest was performed.     COMPARISON:  None     FINDINGS:  Monitoring leads overlie the chest.  Patient is rotated.  Resolution is limited by body habitus with underpenetration.     Cardiomediastinal silhouette is midline and within normal limits for age.  The lungs are well expanded without large consolidation, pleural effusion or  pneumothorax noting slight nonspecific elevation of the right hemidiaphragm.  Pulmonary vasculature and hilar contours are within normal limits.  No acute osseous process seen.  PA and lateral views can be obtained.                                                  X-Ray Abdomen AP 1 View (KUB) (Final result)  Result time 09/11/20 13:56:13                Final result by Adan Santos III, MD (09/11/20 13:56:13)                               Impression:        No acute process seen.        Electronically signed by:     Adan Santos MD  Date:                                            09/11/2020  Time:                                            13:56                         Narrative:     EXAMINATION:  XR ABDOMEN AP 1 VIEW     FINDINGS:  No obstruction, ileus, or perforation seen.  No trauma seen.                                                  CT Head Without Contrast (Final result)  Result time 09/11/20 09:55:45                Final result by Jose Carlos Penn MD (09/11/20 09:55:45)                               Impression:        1. Hypoattenuation involving the superior right frontal lobe with involvement of cortex and underlying white matter.  While this may represent acute or subacute ischemia, additional differential consideration given the imaging appearance and clinical history would include neoplastic, infectious, inflammatory processes.  As such, further evaluation contrast-enhanced brain MRI would be recommended for further evaluation.  2. No acute intracranial hemorrhage or midline shift.  This critical information above was relayed by myself by telephone to Dr. Hughes on 09/11/2020 at 09:50.        Electronically signed by:     Jose Carlos Penn  Date:                                            09/11/2020  Time:                                            09:55                         Narrative:     EXAMINATION:  CT HEAD WITHOUT CONTRAST     CLINICAL HISTORY:  Seizure, nontraumatic (Age => 41y);      TECHNIQUE:  Low dose axial images were obtained through the head.  Coronal and sagittal reformations were also performed. Contrast was not administered.     COMPARISON:  None.     FINDINGS:  There is hypoattenuation involving superomedial right frontal lobe.  There is some blurring of gray-white differentiation.  Hypoattenuation involve the underlying white matter.     There is no evidence of acute intracranial hemorrhage, mass effect, or midline shift.  Generalized age-related parenchymal volume loss is noted.  No evidence of hydrocephalus is seen.  No extra-axial fluid collection is appreciated.  Intracranial atherosclerosis.  No aggressive appearing osseous lesion.  Relatively minor paranasal sinus mucosal thickening is noted.  Leftward gaze deviation is appreciated.  Extracranial soft tissue structures unremarkable.

## 2020-09-13 NOTE — PT/OT/SLP PROGRESS
Speech Language Pathology  Pt not seen    Donna Arias  MRN: [id number]    Patient not seen today secondary to pt with potential active seizures and not appropriate for PO trials. Awaiting EEG eval. SLP will follow up.     Desirae Sanders CCC-SLP  9/13/2020

## 2020-09-13 NOTE — SUBJECTIVE & OBJECTIVE
Neurologic Chief Complaint: seizures, possibly 2/2 to infectious etiology    Subjective:     Interval History: Patient is seen for follow-up neurological assessment and treatment recommendations: seizures. Epilespy note does not indicate seizure activity, concern for other encephalopathy. MRI suggested encephalitis as possible etiology.  LP obtained, pending results.     HPI, Past Medical, Family, and Social History remains the same as documented in the initial encounter.     Review of Systems   Constitutional: Negative for chills, fatigue and fever.   HENT: Negative for drooling.    Respiratory: Negative for shortness of breath.    Cardiovascular: Negative for chest pain.   Gastrointestinal: Negative for anal bleeding.   Genitourinary: Negative for hematuria.   Neurological: Positive for seizures. Negative for facial asymmetry, speech difficulty, weakness and headaches.   Psychiatric/Behavioral: Negative for agitation and confusion. The patient is not nervous/anxious.      Scheduled Meds:   aspirin  81 mg Oral Daily    atorvastatin  40 mg Oral Daily    cloBAZam  10 mg Oral Daily    docusate sodium  100 mg Oral BID    heparin (porcine)  5,000 Units Subcutaneous Q8H    lacosamide (VIMPAT) IVPB  200 mg Intravenous Q12H    levetiracetam IVPB  1,000 mg Intravenous Q12H    multivitamin  1 tablet Oral Daily    sertraline  25 mg Oral Daily    sodium chloride 0.9%  3 mL Intravenous Q8H     Continuous Infusions:   sodium chloride 0.9% 75 mL/hr at 09/13/20 1805     PRN Meds:acetaminophen, acetaminophen, calcium gluconate IVPB, calcium gluconate IVPB, calcium gluconate IVPB, labetalol, lorazepam, magnesium oxide, magnesium oxide, magnesium sulfate IVPB, magnesium sulfate IVPB, ondansetron, potassium chloride in water **AND** potassium chloride in water **AND** potassium chloride in water, potassium chloride, potassium chloride, potassium chloride, potassium, sodium phosphates, potassium, sodium phosphates,  potassium, sodium phosphates, sodium chloride 0.9%, sodium phosphate IVPB, sodium phosphate IVPB, sodium phosphate IVPB    Objective:     Vital Signs (Most Recent):  Temp: 97.9 °F (36.6 °C) (09/13/20 1553)  Pulse: 100 (09/13/20 1805)  Resp: 19 (09/13/20 1805)  BP: 137/64 (09/13/20 1805)  SpO2: 100 % (09/13/20 1805)  BP Location: Right arm    Vital Signs Range (Last 24H):  Temp:  [97.6 °F (36.4 °C)-99 °F (37.2 °C)]   Pulse:  []   Resp:  [15-25]   BP: (134-183)/(63-94)   SpO2:  [95 %-100 %]   BP Location: Right arm    Physical Exam  Constitutional:       Appearance: Normal appearance.   HENT:      Head: Normocephalic.   Eyes:      Extraocular Movements: Extraocular movements intact.   Cardiovascular:      Rate and Rhythm: Normal rate.   Pulmonary:      Effort: Pulmonary effort is normal.   Abdominal:      Palpations: Abdomen is soft.   Skin:     General: Skin is warm and dry.   Neurological:      General: No focal deficit present.      Mental Status: She is alert and oriented to person, place, and time.      Cranial Nerves: Cranial nerves are intact.      Sensory: Sensation is intact.   Psychiatric:         Speech: Speech normal.         Neurological Exam:   LOC: alert and obtunded  Attention Span: poor  Language: No aphasia, Mute  Articulation: Mute/Anarthric  EOM (CN III, IV, VI): Gaze preference  left    Laboratory:  CMP:   Recent Labs   Lab 09/13/20  0500 09/13/20  1711   CALCIUM 8.5*  --    ALBUMIN 3.6  --    PROT 6.4  --      --    K 2.7* 3.4*   CO2 26  --      --    BUN 8  --    CREATININE 0.6  --    ALKPHOS 85  --    ALT 23  --    AST 19  --    BILITOT 0.4  --      CBC:   Recent Labs   Lab 09/13/20  0500   WBC 9.02   RBC 4.04   HGB 11.6*   HCT 37.4      MCV 93   MCH 28.7   MCHC 31.0*     Hgb A1C:   Recent Labs   Lab 09/13/20  0107   HGBA1C 5.7*     TSH:   Recent Labs   Lab 09/12/20  0630   TSH 1.100       Diagnostic Results     Brain Imaging   MRI Brain 9/13  Impression:  Restricted  diffusion and edema throughout the right frontal lobe with sulcal effacement.  Findings concerning for acute infarction in the right CESILIA territory.  No definite underlying mass lesion or abnormal postcontrast enhancement, however, there is significant motion artifact which limits sensitivity.  Along with acute infarction, encephalitis should also be considered in the differential.

## 2020-09-13 NOTE — ASSESSMENT & PLAN NOTE
60 y.o. female PMH HTN, depression and anxiety presents as transfer from OSH for seizures. MRI read concerning for acute infarction in R CESILIA. Vascular Neurology consulted for stroke concern. NIH 0. Seizure like activity while examining pt, L sided gaze and head twitching. Patient event marked on EEG, notified primary team and nurse.     CT head on 9/11 showed hypoattenuation in the right frontal lobe. MRI brain with changes involving the right frontal lobe increased signal, cortical banding which can be seen with seizure activity. Given presentation stroke less likely.     - Continuous EEG in place. Management per primary   - Please contact Vascular Neurology for any questions or concerns.

## 2020-09-13 NOTE — PLAN OF CARE
Psychiatric Care Plan    POC reviewed with Donna Arias and family at 1400. Pt verbalized understanding. Questions and concerns addressed. No acute events today. Pt progressing toward goals. Will continue to monitor. See below and flowsheets for full assessment and VS info.       Neuro:  Rudyard Coma Scale  Best Eye Response: 4-->(E4) spontaneous  Best Motor Response: 4-->(M4) withdraws from pain  Best Verbal Response: 4-->(V4) confused  Eben Coma Scale Score: 12  Assessment Qualifiers: no eye obstruction present        24 hr Temp:  [97.6 °F (36.4 °C)-99 °F (37.2 °C)]     CV:      BP goals:   SBP < 220  MAP > 65    Resp:   O2 Device (Oxygen Therapy): room air       Plan:  continuous EEG, LP    GI/:  AVERY Total Score: 20  Diet/Nutrition Received: NPO  Last Bowel Movement: 09/12/20       Intake/Output Summary (Last 24 hours) at 9/13/2020 1720  Last data filed at 9/13/2020 1705  Gross per 24 hour   Intake 1842.5 ml   Output 1445 ml   Net 397.5 ml     Unmeasured Output  Stool Occurrence: 1    Labs/Accuchecks:  Recent Labs   Lab 09/13/20  0500   WBC 9.02   RBC 4.04   HGB 11.6*   HCT 37.4         Recent Labs   Lab 09/13/20  0500      K 2.7*   CO2 26      BUN 8   CREATININE 0.6   ALKPHOS 85   ALT 23   AST 19   BILITOT 0.4      Recent Labs   Lab 09/12/20  1151  09/13/20  0657   INR 1.0   < > 1.0   APTT 26.2  --   --     < > = values in this interval not displayed.      Recent Labs   Lab 09/13/20  0107   CPK 75   CPKMB 1.2   TROPONINI <0.006   MB 1.6       Electrolytes: Electrolytes replaced  Accuchecks: Q6H    Gtts:   sodium chloride 0.9% 75 mL/hr at 09/13/20 1705       LDA/Wounds:  Lines/Drains/Airways       Drain                   Urethral Catheter 09/12/20 1600 Straight-tip 16 Fr. 1 day              Peripheral Intravenous Line                   Peripheral IV - Single Lumen 09/12/20 0935 Left Antecubital 1 day         Peripheral IV - Single Lumen 09/13/20 0656 24 G Left Hand less than 1 day                   Wounds: No  Wound care consulted: No

## 2020-09-13 NOTE — HPI
Ms Arias is a 61 yo female with sig pmhx of depression with noncompliance of meds for a month, Anxiety, and HTN who presents to [facility] for a higher level of care for right CESILIA and R/O seizure. Patient also PEC on 9/11/20 at OSH for Hallucinations. Per OSH note pt had continued seizure activity occurring every 5 min and was treated with Keppra and Vimpat. Neurology attempted lumbar puncture x 2, but was unsuccessful. CT scan and MRI brain revealed increased signal in the right frontal lobe that Neurology thought might be consistent with seizure activity. Patient is currently AAO x 3 and has no neurological deficits on exam.   9/17 EEG shows fluctuating epileptiform activity. Requiring frequent I/O caths. silodosin started. MRI brain today. Wound care consulted for skin breakdown to coccyx.
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
## 2020-09-13 NOTE — SUBJECTIVE & OBJECTIVE
Interval History:      Review of Systems   Reason unable to perform ROS: answers yes or no to general questions otherwise difficult to acquire ROS    Constitutional: Positive for activity change. Negative for fever.   Respiratory: Negative for cough and shortness of breath.    Gastrointestinal: Negative for nausea.   Musculoskeletal: Negative for joint swelling.   Skin: Negative for color change.   Neurological: Positive for seizures and headaches. Negative for dizziness.        Catatonic like seizure      Objective:     Vitals:  Temp: 97.9 °F (36.6 °C)  Pulse: 83  BP: (!) 159/79  MAP (mmHg): 111  Resp: 18  SpO2: 100 %  O2 Device (Oxygen Therapy): room air    Temp  Min: 97.6 °F (36.4 °C)  Max: 99 °F (37.2 °C)  Pulse  Min: 75  Max: 111  BP  Min: 134/63  Max: 183/84  MAP (mmHg)  Min: 91  Max: 131  Resp  Min: 15  Max: 24  SpO2  Min: 95 %  Max: 100 %    09/12 0701 - 09/13 0700  In: 600   Out: 1050 [Urine:1050]   Unmeasured Output  Stool Occurrence: 1       Physical Exam  Vitals signs reviewed.   Constitutional:       General: She is not in acute distress.  HENT:      Head: Normocephalic.      Mouth/Throat:      Mouth: Mucous membranes are moist.   Eyes:      Pupils: Pupils are equal, round, and reactive to light.   Neck:      Musculoskeletal: Neck supple.   Cardiovascular:      Rate and Rhythm: Normal rate and regular rhythm.      Pulses: Normal pulses.   Pulmonary:      Effort: Pulmonary effort is normal. No respiratory distress.      Breath sounds: Normal breath sounds.   Abdominal:      General: Abdomen is flat.      Palpations: Abdomen is soft.   Musculoskeletal:      Comments: Difficult to assess. Pt does move LE and will squeeze your hand BL UE    Skin:     General: Skin is warm.   Neurological:      Cranial Nerves: No cranial nerve deficit.          Medications:  Continuoussodium chloride 0.9%, Last Rate: 75 mL/hr at 09/13/20 1605    Scheduledaspirin, 81 mg, Daily  atorvastatin, 40 mg, Daily  cloBAZam, 10 mg,  Daily  docusate sodium, 100 mg, BID  heparin (porcine), 5,000 Units, Q8H  lacosamide (VIMPAT) IVPB, 200 mg, Q12H  levetiracetam IVPB, 1,000 mg, Q12H  multivitamin, 1 tablet, Daily  sertraline, 25 mg, Daily  sodium chloride 0.9%, 3 mL, Q8H    PRNacetaminophen, 650 mg, Q6H PRN  acetaminophen, 650 mg, Q4H PRN  calcium gluconate IVPB, 1 g, PRN  calcium gluconate IVPB, 2 g, PRN  calcium gluconate IVPB, 3 g, PRN  labetalol, 10 mg, Q4H PRN  magnesium oxide, 800 mg, PRN  magnesium oxide, 800 mg, PRN  magnesium sulfate IVPB, 2 g, PRN  magnesium sulfate IVPB, 4 g, PRN  ondansetron, 4 mg, Q6H PRN  potassium chloride in water, 40 mEq, PRN    And  potassium chloride in water, 60 mEq, PRN    And  potassium chloride in water, 10 mEq, PRN  potassium chloride, 40 mEq, PRN  potassium chloride, 40 mEq, PRN  potassium chloride, 60 mEq, PRN  potassium, sodium phosphates, 2 packet, PRN  potassium, sodium phosphates, 2 packet, PRN  potassium, sodium phosphates, 2 packet, PRN  sodium chloride 0.9%, 10 mL, PRN  sodium phosphate IVPB, 15 mmol, PRN  sodium phosphate IVPB, 20.01 mmol, PRN  sodium phosphate IVPB, 30 mmol, PRN      Today I personally reviewed pertinent medications, lines/drains/airways, imaging, cardiology results, laboratory results, microbiology results, notably:    Diet  Diet NPO  Diet NPO

## 2020-09-13 NOTE — ED NOTES
RADHA Lezama, NP informed of pt with left arm, leg and right leg drift at time, with some effort against gravity.  Pt also with frequent episodes of left gaze with not following commands, episodes lasting less than 30 seconds.  During episodes pt blinks when object approaches eyes.  VSS, pt maintaining airway.  Lise states that he will round on the pt.  No new orders received.

## 2020-09-13 NOTE — HOSPITAL COURSE
9/13: Seen in ED with NCC. Patient with vEEG showing mild slowing of the background suggestive of a mild encephalopathy but no evidence of an epileptic process;no seizures were recorded during this study. Concern for infectious encephalitis.

## 2020-09-13 NOTE — PROGRESS NOTES
[facility]  Vascular Neurology  Comprehensive Stroke Center  Progress Note    Assessment/Plan:     * New onset seizure with abnormal neurological exam without head trauma   60 y.o. female PMH HTN, depression and anxiety presents as transfer from OSH for seizures. MRI read concerning for acute infarction in R CESILIA. Vascular Neurology consulted for stroke concern. NIH 0. Seizure like activity while examining pt, L sided gaze and head twitching. Patient event marked on EEG, notified primary team and nurse.     CT head on 9/11 showed hypoattenuation in the right frontal lobe. MRI brain with changes involving the right frontal lobe increased signal, cortical banding which can be seen with seizure activity. Given presentation stroke less likely.     - Continuous EEG in place. Management per primary. Epilepsy not convinced of seizure per vEEG 9/13  - Please contact Vascular Neurology for any questions or concerns.   - LP obtained, results pending           9/13: Seen in ED with NCC. Patient with vEEG showing mild slowing of the background suggestive of a mild encephalopathy but no evidence of an epileptic process;no seizures were recorded during this study. Concern for infectious encephalitis.    STROKE DOCUMENTATION        NIH Scale:  1a. Level of Consciousness: 1-->Not alert, but arousable by minor stimulation to obey, answer, or respond  1b. LOC Questions: 0-->Answers both questions correctly  1c. LOC Commands: 0-->Performs both tasks correctly  2. Best Gaze: 1-->Partial gaze palsy, gaze is abnormal in one or both eyes, but forced deviation or total gaze paresis is not present  3. Visual: 0-->No visual loss  4. Facial Palsy: 0-->Normal symmetrical movements  5a. Motor Arm, Left: 0-->No drift, limb holds 90 (or 45) degrees for full 10 secs  5b. Motor Arm, Right: 0-->No drift, limb holds 90 (or 45) degrees for full 10 secs  6a. Motor Leg, Left: 0-->No drift, leg holds 30 degree position for full 5  secs  6b. Motor Leg, Right: 0-->No drift, leg holds 30 degree position for full 5 secs  7. Limb Ataxia: 0-->Absent  8. Sensory: 0-->Normal, no sensory loss  9. Best Language: 3-->Mute, global aphasia, no usable speech or auditory comprehension  10. Dysarthria: 0-->Normal  11. Extinction and Inattention (formerly Neglect): 0-->No abnormality  Total (NIH Stroke Scale): 5       Modified Janna Score: 0  Eben Coma Scale:12   ABCD2 Score:    [id number] Score:   HAS -BLED Score:   ICH Score:   Hunt & Fisher Classification:      Hemorrhagic change of an Ischemic Stroke: Does this patient have an ischemic stroke with hemorrhagic changes? No     Neurologic Chief Complaint: seizures, possibly 2/2 to infectious etiology    Subjective:     Interval History: Patient is seen for follow-up neurological assessment and treatment recommendations: seizures. Epilespy note does not indicate seizure activity, concern for other encephalopathy. MRI suggested encephalitis as possible etiology.  LP obtained, pending results.     HPI, Past Medical, Family, and Social History remains the same as documented in the initial encounter.     Review of Systems   Constitutional: Negative for chills, fatigue and fever.   HENT: Negative for drooling.    Respiratory: Negative for shortness of breath.    Cardiovascular: Negative for chest pain.   Gastrointestinal: Negative for anal bleeding.   Genitourinary: Negative for hematuria.   Neurological: Positive for seizures. Negative for facial asymmetry, speech difficulty, weakness and headaches.   Psychiatric/Behavioral: Negative for agitation and confusion. The patient is not nervous/anxious.      Scheduled Meds:   aspirin  81 mg Oral Daily    atorvastatin  40 mg Oral Daily    cloBAZam  10 mg Oral Daily    docusate sodium  100 mg Oral BID    heparin (porcine)  5,000 Units Subcutaneous Q8H    lacosamide (VIMPAT) IVPB  200 mg Intravenous Q12H    levetiracetam IVPB  1,000 mg Intravenous Q12H     multivitamin  1 tablet Oral Daily    sertraline  25 mg Oral Daily    sodium chloride 0.9%  3 mL Intravenous Q8H     Continuous Infusions:   sodium chloride 0.9% 75 mL/hr at 09/13/20 1805     PRN Meds:acetaminophen, acetaminophen, calcium gluconate IVPB, calcium gluconate IVPB, calcium gluconate IVPB, labetalol, lorazepam, magnesium oxide, magnesium oxide, magnesium sulfate IVPB, magnesium sulfate IVPB, ondansetron, potassium chloride in water **AND** potassium chloride in water **AND** potassium chloride in water, potassium chloride, potassium chloride, potassium chloride, potassium, sodium phosphates, potassium, sodium phosphates, potassium, sodium phosphates, sodium chloride 0.9%, sodium phosphate IVPB, sodium phosphate IVPB, sodium phosphate IVPB    Objective:     Vital Signs (Most Recent):  Temp: 97.9 °F (36.6 °C) (09/13/20 1553)  Pulse: 100 (09/13/20 1805)  Resp: 19 (09/13/20 1805)  BP: 137/64 (09/13/20 1805)  SpO2: 100 % (09/13/20 1805)  BP Location: Right arm    Vital Signs Range (Last 24H):  Temp:  [97.6 °F (36.4 °C)-99 °F (37.2 °C)]   Pulse:  []   Resp:  [15-25]   BP: (134-183)/(63-94)   SpO2:  [95 %-100 %]   BP Location: Right arm    Physical Exam  Constitutional:       Appearance: Normal appearance.   HENT:      Head: Normocephalic.   Eyes:      Extraocular Movements: Extraocular movements intact.   Cardiovascular:      Rate and Rhythm: Normal rate.   Pulmonary:      Effort: Pulmonary effort is normal.   Abdominal:      Palpations: Abdomen is soft.   Skin:     General: Skin is warm and dry.   Neurological:      General: No focal deficit present.      Mental Status: She is alert and oriented to person, place, and time.      Cranial Nerves: Cranial nerves are intact.      Sensory: Sensation is intact.   Psychiatric:         Speech: Speech normal.         Neurological Exam:   LOC: alert and obtunded  Attention Span: poor  Language: No aphasia, Mute  Articulation: Mute/Anarthric  EOM (CN III, IV,  VI): Gaze preference  left    Laboratory:  CMP:   Recent Labs   Lab 09/13/20  0500 09/13/20  1711   CALCIUM 8.5*  --    ALBUMIN 3.6  --    PROT 6.4  --      --    K 2.7* 3.4*   CO2 26  --      --    BUN 8  --    CREATININE 0.6  --    ALKPHOS 85  --    ALT 23  --    AST 19  --    BILITOT 0.4  --      CBC:   Recent Labs   Lab 09/13/20  0500   WBC 9.02   RBC 4.04   HGB 11.6*   HCT 37.4      MCV 93   MCH 28.7   MCHC 31.0*     Hgb A1C:   Recent Labs   Lab 09/13/20  0107   HGBA1C 5.7*     TSH:   Recent Labs   Lab 09/12/20  0630   TSH 1.100       Diagnostic Results     Brain Imaging   MRI Brain 9/13  Impression:  Restricted diffusion and edema throughout the right frontal lobe with sulcal effacement.  Findings concerning for acute infarction in the right CESILIA territory.  No definite underlying mass lesion or abnormal postcontrast enhancement, however, there is significant motion artifact which limits sensitivity.  Along with acute infarction, encephalitis should also be considered in the differential.        Tracie Sellers MD  Comprehensive Stroke Center  Department of Vascular Neurology   [facility]

## 2020-09-13 NOTE — ED NOTES
Received report.  Pt in bed with siderails padded for seizure precautions.  Pt presently awake and alert.  Pt updated on status and v/u.  LOC: The patient is awake, alert and aware of environment with an appropriate affect, the patient is oriented x 3 and speaking appropriately.    APPEARANCE: Patient resting comfortably and in no acute distress, patient is clean and well groomed, patient's clothing is properly fastened.  SKIN: The skin is warm and dry, color consistent with ethnicity, patient has normal skin turgor and moist mucus membranes.  MUSCULOSKELETAL: Patient moving all extremities spontaneously, no obvious swelling or deformities noted.  RESPIRATORY: Airway is open and patent, respirations are spontaneous, patient has a normal effort and rate, no accessory muscle use noted.  CARDIAC: Patient has tachycardia and regular rhythm, no periphreal edema noted, capillary refill < 3 seconds.  ABDOMEN: Soft and non tender to palpation, no distention noted, normoactive bowel sounds present in all four quadrants.  NEUROLOGIC:  facial expression is symmetrical, patient moving all extremities spontaneously, + tingling sensation in all extremities when touched with a finger.  Attempts to follow all commands appropriately.  : Cox catheter in place draining clear yellow urine.

## 2020-09-13 NOTE — PT/OT/SLP PROGRESS
Physical Therapy  Missed Visit    Patient Name:  Donna Arias   MRN:  [id number]  Admit Date: 9/11/2020  Admitting Diagnosis:  New onset seizure with abnormal neurological exam without head trauma   Length of Stay: 2 days  Recent Surgery: * No surgery found *      Patient not seen today secondary to Other (Comment)(Pt with worsening neuro status, appears to be having seizures. RN aware.). Will follow-up as POC allows.    Taylor Sifuentes PT, DPT  9/13/2020  Pager: [phone]

## 2020-09-13 NOTE — CONSULTS
[facility]  Vascular Neurology  Comprehensive Stroke Center  Consult Note    Inpatient consult to Vascular (Stroke) Neurology  Consult performed by: Manjit Santillan NP  Consult ordered by: Andres Clements NP        Assessment/Plan:     Patient is a 60 y.o. year old female with:    * New onset seizure with abnormal neurological exam without head trauma   60 y.o. female PMH HTN, depression and anxiety presents as transfer from OSH for seizures. MRI read concerning for acute infarction in R CESILIA. Vascular Neurology consulted for stroke concern. NIH 0. Seizure like activity while examining pt, L sided gaze and head twitching. Patient event marked on EEG, notified primary team and nurse.     CT head on 9/11 showed hypoattenuation in the right frontal lobe. MRI brain with changes involving the right frontal lobe increased signal, cortical banding which can be seen with seizure activity. Given presentation stroke less likely.     - Continuous EEG in place. Management per primary   - Please contact Vascular Neurology for any questions or concerns.           STROKE DOCUMENTATION          NIH Scale:  1a. Level of Consciousness: 0-->Alert, keenly responsive  1b. LOC Questions: 0-->Answers both questions correctly  1c. LOC Commands: 0-->Performs both tasks correctly  2. Best Gaze: 0-->Normal  3. Visual: 0-->No visual loss  4. Facial Palsy: 0-->Normal symmetrical movements  5a. Motor Arm, Left: 0-->No drift, limb holds 90 (or 45) degrees for full 10 secs  5b. Motor Arm, Right: 0-->No drift, limb holds 90 (or 45) degrees for full 10 secs  6a. Motor Leg, Left: 0-->No drift, leg holds 30 degree position for full 5 secs  6b. Motor Leg, Right: 0-->No drift, leg holds 30 degree position for full 5 secs  7. Limb Ataxia: 0-->Absent  8. Sensory: 0-->Normal, no sensory loss  9. Best Language: 0-->No aphasia, normal  10. Dysarthria: 0-->Normal  11. Extinction and Inattention (formerly Neglect): 0-->No  abnormality  Total (NIH Stroke Scale): 0    Modified Janna Score: 0  Eben Coma Scale:    ABCD2 Score:    [id number] Score:   HAS -BLED Score:   ICH Score:   Hunt & Fisher Classification:       Thrombolysis Candidate? No, Seizure at onset , Strong suspicion for stroke mimic or alternative diagnosis     Delays to Thrombolysis?  No    Interventional Revascularization Candidate?   Is the patient eligible for mechanical endovascular reperfusion (RONALD)?  No; No significant neurological deficit      Hemorrhagic change of an Ischemic Stroke: Does this patient have an ischemic stroke with hemorrhagic changes? No     Subjective:     History of Present Illness:  Donna Arias is a 60 y.o. female PMH HTN, depression and anxiety presents as transfer from OSH for seizures. MRI read concerning for acute infarction in R CESILIA. Vascular Neurology consulted for stroke concern. NIH 0. Seizure like activity while examining pt, L sided gaze and head twitching. Patient event marked on EEG, notified primary team and nurse.     CT head on 9/11 showed hypoattenuation in the right frontal lobe. MRI brain with changes involving the right frontal lobe increased signal, cortical banding which can be seen with seizure activity. Given presentation stroke less likely. Continuous EEG in place.         Past Medical History:   Diagnosis Date    Anxiety     Depression     Hypertension      History reviewed. No pertinent surgical history.  Family History   Problem Relation Age of Onset    Epilepsy Mother     No Known Problems Father      Social History     Tobacco Use    Smoking status: Current Every Day Smoker     Types: Cigarettes   Substance Use Topics    Alcohol use: Yes    Drug use: Never     Review of patient's allergies indicates:   Allergen Reactions    Benadryl [diphenhydramine hcl]     Penicillins Other (See Comments)     unknown    Prednisone        Medications: I have reviewed the current medication administration record.    (Not  in a hospital admission)      Review of Systems   Constitutional: Negative for chills, fatigue and fever.   Respiratory: Negative for shortness of breath.    Cardiovascular: Negative for chest pain.   Neurological: Positive for seizures. Negative for facial asymmetry, speech difficulty, weakness and headaches.   Psychiatric/Behavioral: Negative for agitation and confusion. The patient is not nervous/anxious.      Objective:     Vital Signs (Most Recent):  Temp: 99 °F (37.2 °C) (09/12/20 2001)  Pulse: 105 (09/12/20 2101)  Resp: 18 (09/12/20 2107)  BP: (!) 168/86 (09/12/20 2101)  SpO2: 97 % (09/12/20 2100)    Vital Signs Range (Last 24H):  Temp:  [98.1 °F (36.7 °C)-99 °F (37.2 °C)]   Pulse:  []   Resp:  [16-36]   BP: (111-168)/()   SpO2:  [94 %-100 %]     Physical Exam  Constitutional:       Appearance: Normal appearance.   HENT:      Head: Normocephalic.   Eyes:      Extraocular Movements: Extraocular movements intact.   Cardiovascular:      Rate and Rhythm: Normal rate.   Pulmonary:      Effort: Pulmonary effort is normal.   Abdominal:      Palpations: Abdomen is soft.   Skin:     General: Skin is warm and dry.   Neurological:      General: No focal deficit present.      Mental Status: She is alert and oriented to person, place, and time.      Cranial Nerves: Cranial nerves are intact.      Sensory: Sensation is intact.   Psychiatric:         Speech: Speech normal.         Neurological Exam:   LOC: alert  Attention Span: Good   Language: No aphasia  Articulation: No dysarthria  Orientation: Person, Place, Time   Visual Fields: Full  EOM (CN III, IV, VI): Full/intact  Facial Movement (CN VII): Symmetric facial expression    Motor: Arm left  Normal 5/5  Leg left  Normal 5/5  Arm right  Normal 5/5  Leg right Normal 5/5  Cebellar: No evidence of appendicular or axial ataxia  Sensation: Intact to light touch, temperature and vibration  Tone: Normal tone throughout      Laboratory:  CMP:   Recent Labs   Lab  09/12/20  0630 09/12/20  1451   CALCIUM 8.9 9.2   ALBUMIN 3.6  --    PROT 6.4  --     138   K 2.8* 3.3*   CO2 28 26   CL 99 98   BUN 11 13   CREATININE 0.6 0.7   ALKPHOS 84  --    ALT 26  --    AST 23  --    BILITOT 0.4  --      CBC:   Recent Labs   Lab 09/12/20  0630   WBC 9.62   RBC 3.89*   HGB 11.2*   HCT 34.7*      MCV 89   MCH 28.8   MCHC 32.3     Lipid Panel: No results for input(s): CHOL, LDLCALC, HDL, TRIG in the last 168 hours.  Hgb A1C: No results for input(s): HGBA1C in the last 168 hours.  TSH:   Recent Labs   Lab 09/12/20  0630   TSH 1.100       Diagnostic Results:      Brain imaging:  MRI brain 9/12/2020  MRI brain with changes involving the right frontal lobe increased signal, cortical banding which can be seen with seizure activity.    CT head 9/11/2020 (OSH)  1. Hypoattenuation involving the superior right frontal lobe with involvement of cortex and underlying white matter.  While this may represent acute or subacute ischemia, additional differential consideration given the imaging appearance and clinical history would include neoplastic, infectious, inflammatory processes.  As such, further evaluation contrast-enhanced brain MRI would be recommended for further evaluation.  2. No acute intracranial hemorrhage or midline shift.          Manjit Santillan NP  [facility]  Department of Vascular Neurology   [facility]

## 2020-09-13 NOTE — PROGRESS NOTES
[facility]  Neurocritical Care  Progress Note    Admit Date: 9/11/2020  Service Date: 09/13/2020  Length of Stay: 2    Subjective:     Chief Complaint: New onset seizure with abnormal neurological exam without head trauma    History of Present Illness: Ms Arias is a 61 yo female with sig pmhx of depression with noncompliance of meds for a month, Anxiety, and HTN who presents to [facility] for a higher level of care for right CESILIA and R/O seizure. Patient also PEC on 9/11/20 at OSH for Hallucinations. Per OSH note pt had continued seizure activity occurring every 5 min and was treated with Keppra and Vimpat. Neurology attempted lumbar puncture x 2, but was unsuccessful. CT scan and MRI brain revealed increased signal in the right frontal lobe that Neurology thought might be consistent with seizure activity. Patient is currently AAO x 3 and has no neurological deficits on exam.         Hospital Course: 9/12/2020 Admit to [facility], EEG  09/13/2020: No epilepsy on EEG. Successful LP once Pt arrived on the floor. Pt with waxing and waning neuro exam. Upon arrival Pt able to answer questions and able to follow some commands.     Interval History:      Review of Systems   Reason unable to perform ROS: answers yes or no to general questions otherwise difficult to acquire ROS    Constitutional: Positive for activity change. Negative for fever.   Respiratory: Negative for cough and shortness of breath.    Gastrointestinal: Negative for nausea.   Musculoskeletal: Negative for joint swelling.   Skin: Negative for color change.   Neurological: Positive for seizures and headaches. Negative for dizziness.        Catatonic like seizure      Objective:     Vitals:  Temp: 97.9 °F (36.6 °C)  Pulse: 83  BP: (!) 159/79  MAP (mmHg): 111  Resp: 18  SpO2: 100 %  O2 Device (Oxygen Therapy): room air    Temp  Min: 97.6 °F (36.4 °C)  Max: 99 °F (37.2 °C)  Pulse  Min: 75  Max: 111  BP  Min: 134/63  Max: 183/84  MAP (mmHg)  Min: 91   Max: 131  Resp  Min: 15  Max: 24  SpO2  Min: 95 %  Max: 100 %    09/12 0701 - 09/13 0700  In: 600   Out: 1050 [Urine:1050]   Unmeasured Output  Stool Occurrence: 1       Physical Exam  Vitals signs reviewed.   Constitutional:       General: She is not in acute distress.  HENT:      Head: Normocephalic.      Mouth/Throat:      Mouth: Mucous membranes are moist.   Eyes:      Pupils: Pupils are equal, round, and reactive to light.   Neck:      Musculoskeletal: Neck supple.   Cardiovascular:      Rate and Rhythm: Normal rate and regular rhythm.      Pulses: Normal pulses.   Pulmonary:      Effort: Pulmonary effort is normal. No respiratory distress.      Breath sounds: Normal breath sounds.   Abdominal:      General: Abdomen is flat.      Palpations: Abdomen is soft.   Musculoskeletal:      Comments: Difficult to assess. Pt does move LE and will squeeze your hand BL UE    Skin:     General: Skin is warm.   Neurological:      Cranial Nerves: No cranial nerve deficit.          Medications:  Continuoussodium chloride 0.9%, Last Rate: 75 mL/hr at 09/13/20 1605    Scheduledaspirin, 81 mg, Daily  atorvastatin, 40 mg, Daily  cloBAZam, 10 mg, Daily  docusate sodium, 100 mg, BID  heparin (porcine), 5,000 Units, Q8H  lacosamide (VIMPAT) IVPB, 200 mg, Q12H  levetiracetam IVPB, 1,000 mg, Q12H  multivitamin, 1 tablet, Daily  sertraline, 25 mg, Daily  sodium chloride 0.9%, 3 mL, Q8H    PRNacetaminophen, 650 mg, Q6H PRN  acetaminophen, 650 mg, Q4H PRN  calcium gluconate IVPB, 1 g, PRN  calcium gluconate IVPB, 2 g, PRN  calcium gluconate IVPB, 3 g, PRN  labetalol, 10 mg, Q4H PRN  magnesium oxide, 800 mg, PRN  magnesium oxide, 800 mg, PRN  magnesium sulfate IVPB, 2 g, PRN  magnesium sulfate IVPB, 4 g, PRN  ondansetron, 4 mg, Q6H PRN  potassium chloride in water, 40 mEq, PRN    And  potassium chloride in water, 60 mEq, PRN    And  potassium chloride in water, 10 mEq, PRN  potassium chloride, 40 mEq, PRN  potassium chloride, 40 mEq,  PRN  potassium chloride, 60 mEq, PRN  potassium, sodium phosphates, 2 packet, PRN  potassium, sodium phosphates, 2 packet, PRN  potassium, sodium phosphates, 2 packet, PRN  sodium chloride 0.9%, 10 mL, PRN  sodium phosphate IVPB, 15 mmol, PRN  sodium phosphate IVPB, 20.01 mmol, PRN  sodium phosphate IVPB, 30 mmol, PRN      Today I personally reviewed pertinent medications, lines/drains/airways, imaging, cardiology results, laboratory results, microbiology results, notably:    Diet  Diet NPO  Diet NPO        Assessment/Plan:     Neuro  Acute right CESILIA stroke  -- Continue Neuro checks q 1hr  -- Vascular Neurology consulted-  -- MRI (9/12/20) - changes involving the right frontal lobe increased signal, cortical banding which can be seen with seizure activity. Given presentation stroke less likely.   -- SBP goal <220  -- PT/OT/Speech  -- ASA 81 mg daily, Atorvastatin 40 mg daily, SQH 5000 units q8h  -- OK to restart SQ heparin.     Abnormal CT of brain  -- See Acute right CESILIA stroke    Renal/  Hypokalemia  -- electrolyte replacement ordered    Other  * New onset seizure with abnormal neurological exam without head trauma  -- Continue Neuro checks q 1hr  -- Epilepsy Service consulted- recommend 2 mg of ativan as needed if Pt has catatonic like seizure.   -- Seizure Precautions  -- Continue vEEG  -- Continue cloBAZam Tab 10 mg, Vimpat 200 mg bid, and Keppra 1000 mg bid  -- concern for encephalitis due to infectious etiology- LP performed 9/13/20. Follow up infectious work-up. Consider acyclovir and antimicrobials if Neuro status does not improve or clinically Pt declines.             The patient is being Prophylaxed for:  Venous Thromboembolism with: Mechanical or Chemical  Stress Ulcer with: None  Ventilator Pneumonia with: none    Activity Orders          Diet NPO: NPO starting at 09/12 1910        Full Code    Gregory Ortega MD  Neurocritical Care  [facility]

## 2020-09-13 NOTE — ASSESSMENT & PLAN NOTE
-- Continue Neuro checks q 1hr  -- Vascular Neurology consulted  -- SBP goal <220  -- PT/OT/Speech  -- CXR   -- 2D echo  -- ASA 81 mg daily, Atorvastatin 40 mg daily, SQH 5000 units q8h

## 2020-09-13 NOTE — ASSESSMENT & PLAN NOTE
60 y.o. female PMH HTN, depression and anxiety presents as transfer from OSH for seizures. MRI read concerning for acute infarction in R CESILIA. Vascular Neurology consulted for stroke concern. NIH 0. Seizure like activity while examining pt, L sided gaze and head twitching. Patient event marked on EEG, notified primary team and nurse.     CT head on 9/11 showed hypoattenuation in the right frontal lobe. MRI brain with changes involving the right frontal lobe increased signal, cortical banding which can be seen with seizure activity. Given presentation stroke less likely.     - Continuous EEG in place. Management per primary. Epilepsy not convinced of seizure per vEEG 9/13  - Please contact Vascular Neurology for any questions or concerns.   - LP obtained, results pending

## 2020-09-13 NOTE — SUBJECTIVE & OBJECTIVE
Past Medical History:   Diagnosis Date    Anxiety     Depression     Hypertension      History reviewed. No pertinent surgical history.   No current facility-administered medications on file prior to encounter.      Current Outpatient Medications on File Prior to Encounter   Medication Sig Dispense Refill    gabapentin (NEURONTIN) 100 MG capsule Take 100 mg by mouth 3 (three) times daily.      multivitamin (ONE DAILY MULTIVITAMIN) per tablet Take 1 tablet by mouth once daily.      QUEtiapine (SEROQUEL) 25 MG Tab Take 12.5 mg by mouth every evening.      sertraline (ZOLOFT) 25 MG tablet Take 25 mg by mouth once daily.        Allergies: Benadryl [diphenhydramine hcl], Penicillins, and Prednisone    Family History   Problem Relation Age of Onset    Epilepsy Mother     No Known Problems Father      Social History     Tobacco Use    Smoking status: Current Every Day Smoker     Types: Cigarettes   Substance Use Topics    Alcohol use: Yes    Drug use: Never     Review of Systems   Constitutional: Negative for fever and malaise/fatigue.   Eyes: Negative for blurred vision, double vision and discharge.   Respiratory: Negative for cough, shortness of breath and wheezing.    Cardiovascular: Negative for chest pain, palpitations, claudication, leg swelling and PND.   Gastrointestinal: Negative for abdominal pain, constipation, diarrhea, nausea and vomiting.   Genitourinary: Negative for dysuria, frequency and urgency.   Musculoskeletal:  Negative generalized weakness. Negative for back pain and myalgias.   Skin: Negative for itching and rash.   Neurological: Negative for dizziness, speech change, seizures, and headaches.   Psychiatric/Behavioral: Negative for depression. The patient is not nervous/anxious. Negative for hallucinations.    Objective:     Vitals:    Temp: 98.5 °F (36.9 °C)  Pulse: 84  BP: 136/78  MAP (mmHg): 92  Resp: 20  SpO2: 97 %  O2 Device (Oxygen Therapy): room air    Temp  Min: 98.1 °F (36.7 °C)   Max: 98.5 °F (36.9 °C)  Pulse  Min: 76  Max: 110  BP  Min: 111/57  Max: 166/79  MAP (mmHg)  Min: 77  Max: 120  Resp  Min: 16  Max: 36  SpO2  Min: 94 %  Max: 100 %    09/11 0701 - 09/12 0700  In: 300   Out: 1700 [Urine:1700]   Unmeasured Output  Stool Occurrence: 1       Physical Exam  GA: Alert, comfortable, no acute distress.   HEENT: No scleral icterus or JVD.   Pulmonary: Clear to auscultation A/P/L. No wheezing, crackles, or rhonchi.  Cardiac: RRR S1 & S2 w/o rubs/murmurs/gallops.   Abdominal: Bowel sounds present x 4. No appreciable hepatosplenomegaly.  Skin: No jaundice, rashes, or visible lesions.  Neuro:  --GCS: E4 V5 M6  --Mental Status:  AAO x 3, following commands in all exts, no deficits   --CN II-XII grossly intact.   --Pupils 3mm, PERRL.   --Corneal reflex, gag, cough intact.  --LUE strength: 5/5  --LLE strength: 5/5  --RUE strength: 5/5  --RLE strength: 5/5    Today I personally reviewed pertinent medications, lines/drains/airways, imaging, laboratory results, notably:

## 2020-09-13 NOTE — ASSESSMENT & PLAN NOTE
-- Continue Neuro checks q 1hr  -- Epilepsy Service consulted- recommend 2 mg of ativan as needed if Pt has catatonic like seizure.   -- Seizure Precautions  -- Continue vEEG  -- Continue cloBAZam Tab 10 mg, Vimpat 200 mg bid, and Keppra 1000 mg bid  -- concern for encephalitis due to infectious etiology- LP performed 9/13/20. Follow up infectious work-up. Consider acyclovir and antimicrobials if Neuro status does not improve or clinically Pt declines.

## 2020-09-13 NOTE — NURSING
Patient arrived to [facility] from [facility] via [facility] ([address] [facility] -> main [place] ED -> [id number]    Type of stroke/diagnosis:  Seizures    TPA start and end time N/A    Thrombectomy start and end time N/a    Current symptoms: L gaze, pupils brisk orient to self and hospital    Skin assessment done: Y    Wounds noted: 2 lumbar puncture sites     AVERY Armband Applied: yes    Patient Belongings on Admit: green scrubs    NCC notified: MD Jordan

## 2020-09-13 NOTE — CONSULTS
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                                                  CT Head Without Contrast (Final result)  Result time 09/11/20 09:55:45                Final result by Jose Carlos Penn MD (09/11/20 09:55:45)                               Impression:        1. Hypoattenuation involving the superior right frontal lobe with involvement of cortex and underlying white matter.  While this may represent acute or subacute ischemia, additional differential consideration given the imaging appearance and clinical history would include neoplastic, infectious, inflammatory processes.  As such, further evaluation contrast-enhanced brain MRI would be recommended for further evaluation.  2. No acute intracranial hemorrhage or midline shift.  This critical information above was relayed by myself by telephone to Dr. Hughes on 09/11/2020 at 09:50.        Electronically signed by:     Jose Carlos Penn  Date:                                            09/11/2020  Time:                                            09:55                         Narrative:     EXAMINATION:  CT HEAD WITHOUT CONTRAST     CLINICAL HISTORY:  Seizure, nontraumatic (Age => 41y);     TECHNIQUE:  Low dose axial images were obtained through the head.  Coronal and sagittal reformations were also performed. Contrast was not  administered.     COMPARISON:  None.     FINDINGS:  There is hypoattenuation involving superomedial right frontal lobe.  There is some blurring of gray-white differentiation.  Hypoattenuation involve the underlying white matter.     There is no evidence of acute intracranial hemorrhage, mass effect, or midline shift.  Generalized age-related parenchymal volume loss is noted.  No evidence of hydrocephalus is seen.  No extra-axial fluid collection is appreciated.  Intracranial atherosclerosis.  No aggressive appearing osseous lesion.  Relatively minor paranasal sinus mucosal thickening is noted.  Leftward gaze deviation is appreciated.  Extracranial soft tissue structures unremarkable.                                     Assessment/Plan:     Hallucinations  ASSESSMENT      Donna Arias is a 60 y.o. female with a past psychiatric history of Depression and Anxiety, currently presenting with New onset seizure with abnormal neurological exam without head trauma.  Psychiatry was originally consulted to address the patient's symptoms of visual hallucinations.     Given pt's deteroritaing exam that is severe concern that she is suffering from a siezure vs CESILIA storke. Hallucinations are sequla of these conditions and the areas that they are affecting. Recommend further medical workup.    IMPRESSION  Psychological factors from a general medical condition     RECOMMENDATION(S)       1. Scheduled Medication(s):  none     2. PRN Medication(s):  none     3.  Monitor:  Please obtain daily EKG to monitor QTc; siezure, eeg     4. Legal Status/Precaution(s):  Does not meet criteria     5. Other:  [facility]- unable to perform on repeat exam  DELIRIUM BEHAVIOR MANAGEMENT  · PLEASE utilize CHEMICAL restraints with PRN meds first for agitation. Minimize use of PHYSICAL restraints  · Keep window shades open and room lit during day and room dim at night in order to promote normal sleep-wake cycles  · Encourage family at bedside. [place]  patient often to situation, location, date.  · Continue to Limit or Discontinue use of Narcotics, Benzos and Anti-cholinergic medications as they may worsen delirium.  · Continue medical workup for causative etiology of Delirium.        Case discussed with Dr. Day    Total Time:  45 minutes     Kendrick Simpson MD   Psychiatry  [facility]

## 2020-09-13 NOTE — PROCEDURES
DATE: 9/12/20    EEG NUMBER: FH -1    REFERRING PHYSICIAN:  Dr. Louie     This EEG was performed to assess for subclinical seizures      ELECTROENCEPHALOGRAM REPORT     METHODOLOGY:  Electroencephalographic (EEG) is recorded with electrodes placed according to the International 10-20 placement system.  Thirty two (32) channels of digital signal (sampling rate of 512/sec), including T1 and T2, were simultaneously recorded from the scalp and may include EKG, EMG, and/or eye monitors.  Recording band pass was 0.1 to 512 Hz.  Digital video recording of the patient is simultaneously recorded with the EEG.  The patient is instructed to report clinical symptoms which may occur during the recording session.  EEG and video recording are stored and archived in digital format.  Activation procedures, which include photic stimulation, hyperventilation and instructing patients to perform simple tasks, are done in selected patients.     The EEG is displayed on a monitor screen and can be reviewed using different montages.  Computer-assisted analysis is employed to detect spike and electrographic seizure activity.  The entire record is submitted for computer analysis.  The entire recording is visually reviewed, and the times identified by computer analysis as being spikes or seizures are reviewed again.     Compressed spectral analysis (CSA) is also performed on the activity recorded from each individual channel.  This is displayed as a power display of frequencies from 0 to 30 Hz over time.  The CSA is reviewed looking for asymmetries in power between homologous areas of the scalp, then compared with the original EEG recording.     Webstep software was also utilized in the review of this study.  This software suite analyzes the EEG recording in multiple domains.  Coherence and rhythmicity are computed to identify EEG sections which may contain organized seizures.  Each channel undergoes analysis to detect the presence of  spike and sharp waves which have special and morphological characteristics of epileptic activity.  The routine EEG recording is converted from special into frequency domain.  This is then displayed comparing homologous areas to identify areas of significant asymmetry.  Algorithm to identify non-cortically generated artifact is used to separate artifact from the EEG.     Recording times  Start on September 12, 2020 hr 18 min 59 sec 16  End on September 12, 2020 at hours 21 min 48 sec 51  The total time of EEG recording for the study was 2 hr and 22 min    EEG FINDINGS:  The recording was obtained with a number of standard bipolar and referential montages during wakefulness, drowsiness.  In the alert state, the posterior background rhythm was a symmetric, well-modulated 7-8 Hz activity, which reacted symmetrically to eye opening.  Activation procedures were not performed.  This study is largely obscured by technical artifacts.  There were no interictal epileptiform Maranda no clinical or electrographic seizures were recorded.    The EKG channel was obscured by technical artifact     IMPRESSION:  This is an abnormal EEG during wakefulness, drowsiness. Diffuse slowing was noted.      CLINICAL CORRELATION:  The patient is a 60 year-old female with a history of new onset seizures was currently maintained on Onfi Vimpat and Keppra.  This is an abnormal EEG during wakefulness and drowsiness.  Mild slowing of the background was noted suggestive of a mild encephalopathy nonspecific to the cause. There is no evidence of an epileptic process on this recording.  No seizures were recorded during this study.

## 2020-09-13 NOTE — PT/OT/SLP PROGRESS
Occupational Therapy      Patient Name:  Donna Arias   MRN:  [id number]    Patient not seen today secondary to Unavailable: Pt actively seizing with Lward gaze and cervical rotation. RN present and aware. EEG in place. OT to follow up tomorrow as appropriate. Will follow-up 9/13.    Anusha Cain, OT  9/13/2020

## 2020-09-13 NOTE — ASSESSMENT & PLAN NOTE
-- Continue Neuro checks q 1hr  -- Vascular Neurology consulted-  -- MRI (9/12/20) - changes involving the right frontal lobe increased signal, cortical banding which can be seen with seizure activity. Given presentation stroke less likely.   -- SBP goal <220  -- PT/OT/Speech  -- ASA 81 mg daily, Atorvastatin 40 mg daily, SQH 5000 units q8h  -- OK to restart SQ heparin.

## 2020-09-13 NOTE — ASSESSMENT & PLAN NOTE
ASSESSMENT      Donna Arias is a 60 y.o. female with a past psychiatric history of Depression and Anxiety, currently presenting with New onset seizure with abnormal neurological exam without head trauma.  Psychiatry was originally consulted to address the patient's symptoms of visual hallucinations.     Given pt's deteroritaing exam that is severe concern that she is suffering from a siezure vs CESILIA storke. Hallucinations are sequla of these conditions and the areas that they are affecting. Recommend further medical workup.    IMPRESSION  Psychological factors from a general medical condition     RECOMMENDATION(S)       1. Scheduled Medication(s):  none     2. PRN Medication(s):  none     3.  Monitor:  Please obtain daily EKG to monitor QTc; siezure, eeg     4. Legal Status/Precaution(s):  Does not meet criteria     5. Other:  [facility]- unable to perform on repeat exam  DELIRIUM BEHAVIOR MANAGEMENT  · PLEASE utilize CHEMICAL restraints with PRN meds first for agitation. Minimize use of PHYSICAL restraints  · Keep window shades open and room lit during day and room dim at night in order to promote normal sleep-wake cycles  · Encourage family at bedside. [place] patient often to situation, location, date.  · Continue to Limit or Discontinue use of Narcotics, Benzos and Anti-cholinergic medications as they may worsen delirium.  · Continue medical workup for causative etiology of Delirium.

## 2020-09-13 NOTE — ED NOTES
Pt transferred to hosp bed.  No pressure wounds noted.  Bed rail padding applied for seizure precaution.

## 2020-09-13 NOTE — SUBJECTIVE & OBJECTIVE
Past Medical History:   Diagnosis Date    Anxiety     Depression     Hypertension      History reviewed. No pertinent surgical history.  Family History   Problem Relation Age of Onset    Epilepsy Mother     No Known Problems Father      Social History     Tobacco Use    Smoking status: Current Every Day Smoker     Types: Cigarettes   Substance Use Topics    Alcohol use: Yes    Drug use: Never     Review of patient's allergies indicates:   Allergen Reactions    Benadryl [diphenhydramine hcl]     Penicillins Other (See Comments)     unknown    Prednisone        Medications: I have reviewed the current medication administration record.    (Not in a hospital admission)      Review of Systems   Constitutional: Negative for chills, fatigue and fever.   Respiratory: Negative for shortness of breath.    Cardiovascular: Negative for chest pain.   Neurological: Positive for seizures. Negative for facial asymmetry, speech difficulty, weakness and headaches.   Psychiatric/Behavioral: Negative for agitation and confusion. The patient is not nervous/anxious.      Objective:     Vital Signs (Most Recent):  Temp: 99 °F (37.2 °C) (09/12/20 2001)  Pulse: 105 (09/12/20 2101)  Resp: 18 (09/12/20 2107)  BP: (!) 168/86 (09/12/20 2101)  SpO2: 97 % (09/12/20 2100)    Vital Signs Range (Last 24H):  Temp:  [98.1 °F (36.7 °C)-99 °F (37.2 °C)]   Pulse:  []   Resp:  [16-36]   BP: (111-168)/()   SpO2:  [94 %-100 %]     Physical Exam  Constitutional:       Appearance: Normal appearance.   HENT:      Head: Normocephalic.   Eyes:      Extraocular Movements: Extraocular movements intact.   Cardiovascular:      Rate and Rhythm: Normal rate.   Pulmonary:      Effort: Pulmonary effort is normal.   Abdominal:      Palpations: Abdomen is soft.   Skin:     General: Skin is warm and dry.   Neurological:      General: No focal deficit present.      Mental Status: She is alert and oriented to person, place, and time.      Cranial  Nerves: Cranial nerves are intact.      Sensory: Sensation is intact.   Psychiatric:         Speech: Speech normal.         Neurological Exam:   LOC: alert  Attention Span: Good   Language: No aphasia  Articulation: No dysarthria  Orientation: Person, Place, Time   Visual Fields: Full  EOM (CN III, IV, VI): Full/intact  Facial Movement (CN VII): Symmetric facial expression    Motor: Arm left  Normal 5/5  Leg left  Normal 5/5  Arm right  Normal 5/5  Leg right Normal 5/5  Cebellar: No evidence of appendicular or axial ataxia  Sensation: Intact to light touch, temperature and vibration  Tone: Normal tone throughout      Laboratory:  CMP:   Recent Labs   Lab 09/12/20 0630 09/12/20  1451   CALCIUM 8.9 9.2   ALBUMIN 3.6  --    PROT 6.4  --     138   K 2.8* 3.3*   CO2 28 26   CL 99 98   BUN 11 13   CREATININE 0.6 0.7   ALKPHOS 84  --    ALT 26  --    AST 23  --    BILITOT 0.4  --      CBC:   Recent Labs   Lab 09/12/20  0630   WBC 9.62   RBC 3.89*   HGB 11.2*   HCT 34.7*      MCV 89   MCH 28.8   MCHC 32.3     Lipid Panel: No results for input(s): CHOL, LDLCALC, HDL, TRIG in the last 168 hours.  Hgb A1C: No results for input(s): HGBA1C in the last 168 hours.  TSH:   Recent Labs   Lab 09/12/20  0630   TSH 1.100       Diagnostic Results:      Brain imaging:  MRI brain 9/12/2020  MRI brain with changes involving the right frontal lobe increased signal, cortical banding which can be seen with seizure activity.    CT head 9/11/2020 ([facility])  1. Hypoattenuation involving the superior right frontal lobe with involvement of cortex and underlying white matter.  While this may represent acute or subacute ischemia, additional differential consideration given the imaging appearance and clinical history would include neoplastic, infectious, inflammatory processes.  As such, further evaluation contrast-enhanced brain MRI would be recommended for further evaluation.  2. No acute intracranial hemorrhage or midline shift.

## 2020-09-13 NOTE — H&P
[facility]  Neurocritical Care  History & Physical    Admit Date: 9/11/2020  Service Date: 09/12/2020  Length of Stay: 1    Subjective:     Chief Complaint: New onset seizure with abnormal neurological exam without head trauma    History of Present Illness: Ms Arias is a 59 yo female with sig pmhx of depression with noncompliance of meds for a month, Anxiety, and HTN who presents to [facility] for a higher level of care for right CESILIA and R/O seizure. Patient also PEC on 9/11/20 at OSH for Hallucinations. Per OSH note pt had continued seizure activity occurring every 5 min and was treated with Keppra and Vimpat. Neurology attempted lumbar puncture x 2, but was unsuccessful. CT scan and MRI brain revealed increased signal in the right frontal lobe that Neurology thought might be consistent with seizure activity. Patient is currently AAO x 3 and has no neurological deficits on exam.         Past Medical History:   Diagnosis Date    Anxiety     Depression     Hypertension      History reviewed. No pertinent surgical history.   No current facility-administered medications on file prior to encounter.      Current Outpatient Medications on File Prior to Encounter   Medication Sig Dispense Refill    gabapentin (NEURONTIN) 100 MG capsule Take 100 mg by mouth 3 (three) times daily.      multivitamin (ONE DAILY MULTIVITAMIN) per tablet Take 1 tablet by mouth once daily.      QUEtiapine (SEROQUEL) 25 MG Tab Take 12.5 mg by mouth every evening.      sertraline (ZOLOFT) 25 MG tablet Take 25 mg by mouth once daily.        Allergies: Benadryl [diphenhydramine hcl], Penicillins, and Prednisone    Family History   Problem Relation Age of Onset    Epilepsy Mother     No Known Problems Father      Social History     Tobacco Use    Smoking status: Current Every Day Smoker     Types: Cigarettes   Substance Use Topics    Alcohol use: Yes    Drug use: Never     Review of Systems   Constitutional: Negative for  fever and malaise/fatigue.   Eyes: Negative for blurred vision, double vision and discharge.   Respiratory: Negative for cough, shortness of breath and wheezing.    Cardiovascular: Negative for chest pain, palpitations, claudication, leg swelling and PND.   Gastrointestinal: Negative for abdominal pain, constipation, diarrhea, nausea and vomiting.   Genitourinary: Negative for dysuria, frequency and urgency.   Musculoskeletal:  Negative generalized weakness. Negative for back pain and myalgias.   Skin: Negative for itching and rash.   Neurological: Negative for dizziness, speech change, seizures, and headaches.   Psychiatric/Behavioral: Negative for depression. The patient is not nervous/anxious. Negative for hallucinations.    Objective:     Vitals:    Temp: 98.5 °F (36.9 °C)  Pulse: 84  BP: 136/78  MAP (mmHg): 92  Resp: 20  SpO2: 97 %  O2 Device (Oxygen Therapy): room air    Temp  Min: 98.1 °F (36.7 °C)  Max: 98.5 °F (36.9 °C)  Pulse  Min: 76  Max: 110  BP  Min: 111/57  Max: 166/79  MAP (mmHg)  Min: 77  Max: 120  Resp  Min: 16  Max: 36  SpO2  Min: 94 %  Max: 100 %    09/11 0701 - 09/12 0700  In: 300   Out: 1700 [Urine:1700]   Unmeasured Output  Stool Occurrence: 1       Physical Exam  GA: Alert, comfortable, no acute distress.   HEENT: No scleral icterus or JVD.   Pulmonary: Clear to auscultation A/P/L. No wheezing, crackles, or rhonchi.  Cardiac: RRR S1 & S2 w/o rubs/murmurs/gallops.   Abdominal: Bowel sounds present x 4. No appreciable hepatosplenomegaly.  Skin: No jaundice, rashes, or visible lesions.  Neuro:  --GCS: E4 V5 M6  --Mental Status:  AAO x 3, following commands in all exts, no deficits   --CN II-XII grossly intact.   --Pupils 3mm, PERRL.   --Corneal reflex, gag, cough intact.  --LUE strength: 5/5  --LLE strength: 5/5  --RUE strength: 5/5  --RLE strength: 5/5    Today I personally reviewed pertinent medications, lines/drains/airways, imaging, laboratory results, notably:        Assessment/Plan:      Neuro  Acute right CESILIA stroke  -- Continue Neuro checks q 1hr  -- Vascular Neurology consulted  -- SBP goal <220  -- PT/OT/Speech  -- CXR   -- 2D echo  -- ASA 81 mg daily, Atorvastatin 40 mg daily, SQH 5000 units q8h    Abnormal CT of brain  -- See Acute right CESILIA stroke    Renal/  Hypokalemia  -- electrolyte replacement ordered    Other  * New onset seizure with abnormal neurological exam without head trauma  -- Continue Neuro checks q 1hr  -- Epilepsy Service consulted  -- Seizure Precautions  -- Continue vEEG  -- Continue cloBAZam Tab 10 mg, Vimpat 200 mg bid, and Keppra 1000 mg bid  -- Pending further recommendations from Epilepsy service    Hallucinations  -- Consulted Psyc  -- Sitter ordered            Activity Orders          Diet NPO: NPO starting at 09/12 1910        Full Code    Andres Clements NP  Neurocritical Care  [facility]

## 2020-09-13 NOTE — HPI
Donna Arias is a 60 y.o. female PMH HTN, depression and anxiety presents as transfer from OSH for seizures. MRI read concerning for acute infarction in R CESILIA. Vascular Neurology consulted for stroke concern. NIH 0. Seizure like activity while examining pt, L sided gaze and head twitching. Patient event marked on EEG, notified primary team and nurse.     CT head on 9/11 showed hypoattenuation in the right frontal lobe. MRI brain with changes involving the right frontal lobe increased signal, cortical banding which can be seen with seizure activity. Given presentation stroke less likely. Continuous EEG in place.

## 2020-09-13 NOTE — ED NOTES
Pt continues with episodes of left sided head turning/stare.  Pt answers questions during episode.  Awaiting EEG monitoring.  Pt updated on status and v/u.  Remains in bed with padded rails.

## 2020-09-13 NOTE — HOSPITAL COURSE
9/12/2020 Admit to [facility], EEG  09/13/2020: No epilepsy on EEG. Successful LP once Pt arrived on the floor. Pt with waxing and waning neuro exam. Upon arrival Pt able to answer questions and able to follow some commands.   9/14/2020: YOVANI, on EEG-   9/15/2020: 2 clinical seizures per nurse early this morning, pending epilepsy reccs, continue EEG monitoring  09/16/2020: cEEG. Start TF. Autoimmune workup pending. Continue to adjust AEDs, appreciate epilepsy reccs.  9/17/2020Off EEG currently. AAOx3 Continue AEDs. Will step down to hospital with epilepsy following.

## 2020-09-13 NOTE — PROCEDURES
"Donna Arias is a 60 y.o. female patient.    Temp: 97.9 °F (36.6 °C) (09/13/20 1553)  Pulse: 83 (09/13/20 1605)  Resp: 18 (09/13/20 1605)  BP: (!) 159/79 (09/13/20 1605)  SpO2: 100 % (09/13/20 1605)  Weight: 72.6 kg (160 lb) (09/13/20 1553)  Height: 5' 2.01" (157.5 cm) (09/13/20 1553)       Lumbar Puncture    Date/Time: 9/13/2020 4:36 PM  Location procedure was performed: [facility] NEURO CRITICAL [facility]ARE  Performed by: Gregory Ortega MD  Authorized by: Gregory Ortega MD   Consent Done: Yes  Indications: evaluation for altered mental status and evaluation for infection  Anesthesia: local infiltration    Anesthesia:  Local Anesthetic: lidocaine 1% with epinephrine  Patient sedated: no  Preparation: Patient was prepped and draped in the usual sterile fashion.  Lumbar space: L4-L5 interspace  Patient's position: left lateral decubitus  Needle gauge: 18  Needle type: spinal needle - Quincke tip  Needle length: 3.5 in  Number of attempts: 1  Opening pressure: 9 cm H2O  Fluid appearance: blood-tinged then clearing  Tubes of fluid: 4  Total volume: 15 ml  Post-procedure: site cleaned and adhesive bandage applied  Complications: No  Specimens: No  Implants: No  Patient tolerance: Patient tolerated the procedure well with no immediate complications          Gregory Ortega  9/13/2020  "

## 2020-09-13 NOTE — ASSESSMENT & PLAN NOTE
-- Continue Neuro checks q 1hr  -- Epilepsy Service consulted  -- Seizure Precautions  -- Continue vEEG  -- Continue cloBAZam Tab 10 mg, Vimpat 200 mg bid, and Keppra 1000 mg bid  -- Pending further recommendations from Epilepsy service
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
## 2020-09-14 PROBLEM — I63.9 STROKE: Status: ACTIVE | Noted: 2020-09-12

## 2020-09-14 PROBLEM — F32.A DEPRESSION: Status: ACTIVE | Noted: 2020-09-14

## 2020-09-14 PROBLEM — G40.909 SEIZURE DISORDER: Status: ACTIVE | Noted: 2020-09-11

## 2020-09-14 LAB
ALBUMIN SERPL BCP-MCNC: 3.7 G/DL (ref 3.5–5.2)
ALLENS TEST: ABNORMAL
ALP SERPL-CCNC: 93 U/L (ref 55–135)
ALT SERPL W/O P-5'-P-CCNC: 25 U/L (ref 10–44)
ANION GAP SERPL CALC-SCNC: 12 MMOL/L (ref 8–16)
ASCENDING AORTA: 3.79 CM
AST SERPL-CCNC: 23 U/L (ref 10–40)
AV INDEX (PROSTH): 1.28
AV MEAN GRADIENT: 2 MMHG
AV PEAK GRADIENT: 4 MMHG
AV VALVE AREA: 4.97 CM2
AV VELOCITY RATIO: 0.92
BASOPHILS # BLD AUTO: 0.05 K/UL (ref 0–0.2)
BASOPHILS NFR BLD: 0.5 % (ref 0–1.9)
BILIRUB SERPL-MCNC: 0.4 MG/DL (ref 0.1–1)
BSA FOR ECHO PROCEDURE: 1.78 M2
BUN SERPL-MCNC: 8 MG/DL (ref 6–20)
CALCIUM SERPL-MCNC: 8.9 MG/DL (ref 8.7–10.5)
CHLORIDE SERPL-SCNC: 104 MMOL/L (ref 95–110)
CO2 SERPL-SCNC: 22 MMOL/L (ref 23–29)
CREAT SERPL-MCNC: 0.6 MG/DL (ref 0.5–1.4)
CRYPTOC AG CSF QL LA: NEGATIVE
CV ECHO LV RWT: 0.44 CM
DELSYS: ABNORMAL
DIFFERENTIAL METHOD: ABNORMAL
DOP CALC AO PEAK VEL: 0.98 M/S
DOP CALC AO VTI: 15.63 CM
DOP CALC LVOT AREA: 3.9 CM2
DOP CALC LVOT DIAMETER: 2.22 CM
DOP CALC LVOT PEAK VEL: 0.9 M/S
DOP CALC LVOT STROKE VOLUME: 77.69 CM3
DOP CALCLVOT PEAK VEL VTI: 20.08 CM
E WAVE DECELERATION TIME: 158.53 MSEC
E/A RATIO: 0.91
E/E' RATIO: 9.38 M/S
ECHO LV POSTERIOR WALL: 0.91 CM (ref 0.6–1.1)
EOSINOPHIL # BLD AUTO: 0.1 K/UL (ref 0–0.5)
EOSINOPHIL NFR BLD: 1 % (ref 0–8)
ERYTHROCYTE [DISTWIDTH] IN BLOOD BY AUTOMATED COUNT: 13.8 % (ref 11.5–14.5)
EST. GFR  (AFRICAN AMERICAN): >60 ML/MIN/1.73 M^2
EST. GFR  (NON AFRICAN AMERICAN): >60 ML/MIN/1.73 M^2
FIO2: 21
FRACTIONAL SHORTENING: 38 % (ref 28–44)
GLUCOSE SERPL-MCNC: 99 MG/DL (ref 70–110)
HCO3 UR-SCNC: 26.6 MMOL/L (ref 24–28)
HCT VFR BLD AUTO: 36.7 % (ref 37–48.5)
HGB BLD-MCNC: 11.8 G/DL (ref 12–16)
HIV 1+2 AB+HIV1 P24 AG SERPL QL IA: NEGATIVE
IMM GRANULOCYTES # BLD AUTO: 0.04 K/UL (ref 0–0.04)
IMM GRANULOCYTES NFR BLD AUTO: 0.4 % (ref 0–0.5)
INR PPP: 1 (ref 0.8–1.2)
INTERVENTRICULAR SEPTUM: 1.03 CM (ref 0.6–1.1)
LA MAJOR: 3.85 CM
LA MINOR: 3.96 CM
LA WIDTH: 3.35 CM
LEFT ATRIUM SIZE: 2.79 CM
LEFT ATRIUM VOLUME INDEX: 17.8 ML/M2
LEFT ATRIUM VOLUME: 31.02 CM3
LEFT INTERNAL DIMENSION IN SYSTOLE: 2.6 CM (ref 2.1–4)
LEFT VENTRICLE DIASTOLIC VOLUME INDEX: 44.73 ML/M2
LEFT VENTRICLE DIASTOLIC VOLUME: 77.77 ML
LEFT VENTRICLE MASS INDEX: 75 G/M2
LEFT VENTRICLE SYSTOLIC VOLUME INDEX: 14.2 ML/M2
LEFT VENTRICLE SYSTOLIC VOLUME: 24.65 ML
LEFT VENTRICULAR INTERNAL DIMENSION IN DIASTOLE: 4.18 CM (ref 3.5–6)
LEFT VENTRICULAR MASS: 130.55 G
LV LATERAL E/E' RATIO: 7.63 M/S
LV SEPTAL E/E' RATIO: 12.2 M/S
LYMPHOCYTES # BLD AUTO: 1.7 K/UL (ref 1–4.8)
LYMPHOCYTES NFR BLD: 17.8 % (ref 18–48)
MAGNESIUM SERPL-MCNC: 1.9 MG/DL (ref 1.6–2.6)
MCH RBC QN AUTO: 29.4 PG (ref 27–31)
MCHC RBC AUTO-ENTMCNC: 32.2 G/DL (ref 32–36)
MCV RBC AUTO: 91 FL (ref 82–98)
MODE: ABNORMAL
MONOCYTES # BLD AUTO: 0.5 K/UL (ref 0.3–1)
MONOCYTES NFR BLD: 4.9 % (ref 4–15)
MV PEAK A VEL: 0.67 M/S
MV PEAK E VEL: 0.61 M/S
MV STENOSIS PRESSURE HALF TIME: 45.97 MS
MV VALVE AREA P 1/2 METHOD: 4.79 CM2
NEUTROPHILS # BLD AUTO: 7.3 K/UL (ref 1.8–7.7)
NEUTROPHILS NFR BLD: 75.4 % (ref 38–73)
NRBC BLD-RTO: 0 /100 WBC
PCO2 BLDA: 37.5 MMHG (ref 35–45)
PH SMN: 7.46 [PH] (ref 7.35–7.45)
PHOSPHATE SERPL-MCNC: 3.3 MG/DL (ref 2.7–4.5)
PISA TR MAX VEL: 3.1 M/S
PLATELET # BLD AUTO: 350 K/UL (ref 150–350)
PMV BLD AUTO: 8.9 FL (ref 9.2–12.9)
PO2 BLDA: 87 MMHG (ref 80–100)
POC BE: 3 MMOL/L
POC SATURATED O2: 97 % (ref 95–100)
POC TCO2: 28 MMOL/L (ref 23–27)
POCT GLUCOSE: 105 MG/DL (ref 70–110)
POCT GLUCOSE: 146 MG/DL (ref 70–110)
POCT GLUCOSE: 84 MG/DL (ref 70–110)
POTASSIUM SERPL-SCNC: 3.9 MMOL/L (ref 3.5–5.1)
PROT CSF-MCNC: 167 MG/DL (ref 15–40)
PROT SERPL-MCNC: 6.6 G/DL (ref 6–8.4)
PROTHROMBIN TIME: 11 SEC (ref 9–12.5)
RA MAJOR: 3.56 CM
RA PRESSURE: 3 MMHG
RA WIDTH: 2.56 CM
RBC # BLD AUTO: 4.02 M/UL (ref 4–5.4)
RPR SER QL: NORMAL
RV TISSUE DOPPLER FREE WALL SYSTOLIC VELOCITY 1 (APICAL 4 CHAMBER VIEW): 17.05 CM/S
SAMPLE: ABNORMAL
SINUS: 3.72 CM
SITE: ABNORMAL
SODIUM SERPL-SCNC: 138 MMOL/L (ref 136–145)
SP02: 100
STJ: 2.89 CM
TDI LATERAL: 0.08 M/S
TDI SEPTAL: 0.05 M/S
TDI: 0.07 M/S
TR MAX PG: 38 MMHG
TRICUSPID ANNULAR PLANE SYSTOLIC EXCURSION: 2.14 CM
TV REST PULMONARY ARTERY PRESSURE: 41 MMHG
VALPROATE SERPL-MCNC: 191.7 UG/ML (ref 50–100)
WBC # BLD AUTO: 9.75 K/UL (ref 3.9–12.7)

## 2020-09-14 PROCEDURE — 99233 PR SUBSEQUENT HOSPITAL CARE,LEVL III: ICD-10-PCS | Mod: ,,, | Performed by: PSYCHIATRY & NEUROLOGY

## 2020-09-14 PROCEDURE — 94761 N-INVAS EAR/PLS OXIMETRY MLT: CPT

## 2020-09-14 PROCEDURE — 95720 EEG PHY/QHP EA INCR W/VEEG: CPT | Mod: ,,, | Performed by: PSYCHIATRY & NEUROLOGY

## 2020-09-14 PROCEDURE — 36600 WITHDRAWAL OF ARTERIAL BLOOD: CPT

## 2020-09-14 PROCEDURE — 85610 PROTHROMBIN TIME: CPT

## 2020-09-14 PROCEDURE — 63600175 PHARM REV CODE 636 W HCPCS: Performed by: PSYCHIATRY & NEUROLOGY

## 2020-09-14 PROCEDURE — 25000003 PHARM REV CODE 250: Performed by: PSYCHIATRY & NEUROLOGY

## 2020-09-14 PROCEDURE — 85025 COMPLETE CBC W/AUTO DIFF WBC: CPT

## 2020-09-14 PROCEDURE — 80053 COMPREHEN METABOLIC PANEL: CPT

## 2020-09-14 PROCEDURE — 95714 VEEG EA 12-26 HR UNMNTR: CPT

## 2020-09-14 PROCEDURE — 63600175 PHARM REV CODE 636 W HCPCS: Performed by: STUDENT IN AN ORGANIZED HEALTH CARE EDUCATION/TRAINING PROGRAM

## 2020-09-14 PROCEDURE — 80164 ASSAY DIPROPYLACETIC ACD TOT: CPT

## 2020-09-14 PROCEDURE — 63600175 PHARM REV CODE 636 W HCPCS: Performed by: NURSE PRACTITIONER

## 2020-09-14 PROCEDURE — 97162 PT EVAL MOD COMPLEX 30 MIN: CPT

## 2020-09-14 PROCEDURE — C9254 INJECTION, LACOSAMIDE: HCPCS | Performed by: STUDENT IN AN ORGANIZED HEALTH CARE EDUCATION/TRAINING PROGRAM

## 2020-09-14 PROCEDURE — 84100 ASSAY OF PHOSPHORUS: CPT

## 2020-09-14 PROCEDURE — 20000000 HC ICU ROOM

## 2020-09-14 PROCEDURE — 83735 ASSAY OF MAGNESIUM: CPT

## 2020-09-14 PROCEDURE — 99233 SBSQ HOSP IP/OBS HIGH 50: CPT | Mod: ,,, | Performed by: PSYCHIATRY & NEUROLOGY

## 2020-09-14 PROCEDURE — 99233 PR SUBSEQUENT HOSPITAL CARE,LEVL III: ICD-10-PCS | Mod: ,,, | Performed by: NURSE PRACTITIONER

## 2020-09-14 PROCEDURE — 95720 PR EEG, W/VIDEO, CONT RECORD, I&R, >12<26 HRS: ICD-10-PCS | Mod: ,,, | Performed by: PSYCHIATRY & NEUROLOGY

## 2020-09-14 PROCEDURE — 82803 BLOOD GASES ANY COMBINATION: CPT

## 2020-09-14 PROCEDURE — 92610 EVALUATE SWALLOWING FUNCTION: CPT

## 2020-09-14 PROCEDURE — 97166 OT EVAL MOD COMPLEX 45 MIN: CPT

## 2020-09-14 PROCEDURE — 99900035 HC TECH TIME PER 15 MIN (STAT)

## 2020-09-14 PROCEDURE — 25000003 PHARM REV CODE 250: Performed by: NURSE PRACTITIONER

## 2020-09-14 PROCEDURE — 82800 BLOOD PH: CPT

## 2020-09-14 PROCEDURE — 99233 SBSQ HOSP IP/OBS HIGH 50: CPT | Mod: ,,, | Performed by: NURSE PRACTITIONER

## 2020-09-14 PROCEDURE — C9254 INJECTION, LACOSAMIDE: HCPCS | Performed by: NURSE PRACTITIONER

## 2020-09-14 PROCEDURE — 25000003 PHARM REV CODE 250: Performed by: STUDENT IN AN ORGANIZED HEALTH CARE EDUCATION/TRAINING PROGRAM

## 2020-09-14 PROCEDURE — A4216 STERILE WATER/SALINE, 10 ML: HCPCS | Performed by: NURSE PRACTITIONER

## 2020-09-14 PROCEDURE — 97530 THERAPEUTIC ACTIVITIES: CPT

## 2020-09-14 RX ORDER — LORAZEPAM 2 MG/ML
2 INJECTION INTRAMUSCULAR ONCE
Status: COMPLETED | OUTPATIENT
Start: 2020-09-14 | End: 2020-09-14

## 2020-09-14 RX ORDER — CLOBAZAM 10 MG/1
20 TABLET ORAL 2 TIMES DAILY
Status: DISCONTINUED | OUTPATIENT
Start: 2020-09-14 | End: 2020-09-15

## 2020-09-14 RX ADMIN — CLOBAZAM 20 MG: 10 TABLET ORAL at 10:09

## 2020-09-14 RX ADMIN — THERA TABS 1 TABLET: TAB at 08:09

## 2020-09-14 RX ADMIN — DOCUSATE SODIUM 100 MG: 100 CAPSULE, LIQUID FILLED ORAL at 10:09

## 2020-09-14 RX ADMIN — SODIUM CHLORIDE 200 MG: 0.9 INJECTION, SOLUTION INTRAVENOUS at 01:09

## 2020-09-14 RX ADMIN — Medication 3 ML: at 10:09

## 2020-09-14 RX ADMIN — HEPARIN SODIUM 5000 UNITS: 5000 INJECTION INTRAVENOUS; SUBCUTANEOUS at 02:09

## 2020-09-14 RX ADMIN — SERTRALINE 25 MG: 25 TABLET, FILM COATED ORAL at 08:09

## 2020-09-14 RX ADMIN — ASPIRIN 81 MG: 81 TABLET, COATED ORAL at 08:09

## 2020-09-14 RX ADMIN — SODIUM CHLORIDE 400 MG: 9 INJECTION, SOLUTION INTRAVENOUS at 06:09

## 2020-09-14 RX ADMIN — LEVETIRACETAM INJECTION 1000 MG: 10 INJECTION INTRAVENOUS at 08:09

## 2020-09-14 RX ADMIN — DEXTROSE MONOHYDRATE 2900 MG: 50 INJECTION, SOLUTION INTRAVENOUS at 01:09

## 2020-09-14 RX ADMIN — SODIUM CHLORIDE 200 MG: 0.9 INJECTION, SOLUTION INTRAVENOUS at 11:09

## 2020-09-14 RX ADMIN — CLOBAZAM 10 MG: 10 TABLET ORAL at 08:09

## 2020-09-14 RX ADMIN — DOCUSATE SODIUM 100 MG: 100 CAPSULE, LIQUID FILLED ORAL at 08:09

## 2020-09-14 RX ADMIN — ATORVASTATIN CALCIUM 40 MG: 20 TABLET, FILM COATED ORAL at 08:09

## 2020-09-14 RX ADMIN — VALPROATE SODIUM 750 MG: 100 INJECTION, SOLUTION INTRAVENOUS at 07:09

## 2020-09-14 RX ADMIN — ACYCLOVIR SODIUM 500 MG: 1000 INJECTION, SOLUTION INTRAVENOUS at 02:09

## 2020-09-14 RX ADMIN — SODIUM CHLORIDE 200 MG: 0.9 INJECTION, SOLUTION INTRAVENOUS at 12:09

## 2020-09-14 RX ADMIN — LEVETIRACETAM 2000 MG: 100 INJECTION, SOLUTION, CONCENTRATE INTRAVENOUS at 09:09

## 2020-09-14 RX ADMIN — ACETAMINOPHEN 650 MG: 325 TABLET ORAL at 11:09

## 2020-09-14 RX ADMIN — HEPARIN SODIUM 5000 UNITS: 5000 INJECTION INTRAVENOUS; SUBCUTANEOUS at 05:09

## 2020-09-14 RX ADMIN — HEPARIN SODIUM 5000 UNITS: 5000 INJECTION INTRAVENOUS; SUBCUTANEOUS at 09:09

## 2020-09-14 RX ADMIN — ACYCLOVIR SODIUM 500 MG: 1000 INJECTION, SOLUTION INTRAVENOUS at 12:09

## 2020-09-14 RX ADMIN — Medication 3 ML: at 02:09

## 2020-09-14 RX ADMIN — ACYCLOVIR SODIUM 500 MG: 1000 INJECTION, SOLUTION INTRAVENOUS at 09:09

## 2020-09-14 RX ADMIN — SODIUM CHLORIDE: 0.9 INJECTION, SOLUTION INTRAVENOUS at 01:09

## 2020-09-14 RX ADMIN — LORAZEPAM 2 MG: 2 INJECTION INTRAMUSCULAR; INTRAVENOUS at 06:09

## 2020-09-14 NOTE — PROCEDURES
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  Evaluate for evidence of epileptiform activity.    METHODOLOGY   Electroencephalographic (EEG) recording is with electrodes placed according to the International 10-20 placement system.  Thirty two (32) channels of digital signal (sampling rate of 512/sec) including T1 and T2 was simultaneously recorded from the scalp and may include  EKG, EMG, and/or eye monitors.  Recording band pass was 0.1 to 512 hz.  Digital video recording of the patient is simultaneously recorded with the EEG.  The patient is instructed report clinical symptoms which may occur during the recording session.  EEG and video recording is stored and archived in digital format.  Activation procedures which include photic stimulation, hyperventilation and instructing patients to perform simple task are done in selected patients.   The EEG is displayed on a monitor screen and can be reviewed using different montages.  Computer assisted analysis is employed to detect spike and electrographic seizure activity.   The entire record is submitted for computer analysis.  The entire recording is visually reviewed and the times identified by computer analysis as being spikes or seizures are reviewed again.  Compresses spectral analysis (CSA) is also performed on the activity recorded from each individual channel.  This is displayed as a power display of frequencies from 0 to 30 Hz over time.   The CSA is reviewed looking for asymmetries in power between homologous areas of the scalp and then compared with the original EEG recording.     [employer] software is also utilized in the review of this study.  This software suite analyzes the EEG  recording in multiple domains.  Coherence and rhythmicity is computed to identify EEG sections which may contain organized seizures.  Each channel undergoes analysis to detect presence of spike and sharp waves which have special and morphological characteristic of epileptic activity.  The routine EEG recording is converted from spacial into frequency domain.  This is then displayed comparing homologous areas to identify areas of significant asymmetry.  Algorithm to identify non-cortically generated artifact is used to separate eye movement, EMG and other artifact from the EEG.      RECORDING TIMES  Start on 09/13/2020 at 10:04 a.m.  Stop on 09/14/2020 at 07:00 a.m.  A total of 20 hr and 9 min of EEG recording is obtained.    EEG FINDINGS  Background activity:   The background is continuous, disorganized, and asymmetric.  Over the left hemisphere, there is a mixture of prominent delta activity, often occurring in long rhythmic runs, with overriding faster frequencies.  On the right, there is sharply contoured slowing most prominent at F4/F8.  Frequently, throughout the study,, there is evolution of this activity with the development of higher amplitude, rhythmic delta -> theta activity with superimposed fast/sharp activity which then abruptly ceases.  During these periods, the patient has a prominent left head turn with left eye version.  After these episodes cease, the patient often returns her head to midline.  There are pushbutton activations at 10:30 a.m., 12:30 p.m., and 21:00 p.m. for clinical episodes of prominent left head version at times with right arm flexion across the body which appear consistent with epileptic seizures.    Sleep:  There is poorly formed sleep architecture    Activation procedures:   Hyperventilation is not performed  Photic stimulation is not performed    Cardiac Monitor:   Heart rate appears generally regular on a single lead EKG.    Impression:   This is an abnormal continuous EEG  monitoring study because recurrent electroclinical seizures characterized by prominent left head and eye version throughout the recording session.  Findings and recommendations to escalate anticonvulsant therapy discussed with the primary team.      LTM Summary 09/13/2020 - TBD:  Patient events/Seizures:   recurrent electroclinical seizures characterized by evolution of sharply contoured slowing over the right anterior quadrant into rhythmic delta -> theta activity with superimposed sharp/fast activity which abruptly ceases.  Clinically, this activity is associated with prominent left head/eye version.  Interictal findings:   disorganized asynchronous slowing, right>left  Other notable abnormalities:  None    Elizabeth Funes MD PhD  Neurology-Epilepsy  [facility].  [facility]

## 2020-09-14 NOTE — ASSESSMENT & PLAN NOTE
ASSESSMENT      Donna Arias is a 60 y.o. female with a past psychiatric history of Depression and Anxiety, currently presenting with New onset seizure with abnormal neurological exam without head trauma.  Psychiatry was originally consulted to address the patient's symptoms of visual hallucinations.     Given pt's deteroritaing exam that is severe concern that she is suffering from a siezure vs CESILIA storke. Hallucinations are sequla of these conditions and the areas that they are affecting. Recommend further medical workup.    IMPRESSION  Psychological factors from a general medical condition     RECOMMENDATION(S)       1. Scheduled Medication(s):  none     2. PRN Medication(s):  none     3.  Monitor:  Please obtain daily EKG to monitor QTc; siezure, eeg     4. Legal Status/Precaution(s):  Does not meet criteria     5. Other:  CAM ICU- unable to perform on repeat exam  DELIRIUM BEHAVIOR MANAGEMENT  · PLEASE utilize CHEMICAL restraints with PRN meds first for agitation. Minimize use of PHYSICAL restraints  · Keep window shades open and room lit during day and room dim at night in order to promote normal sleep-wake cycles  · Encourage family at bedside. Dana patient often to situation, location, date.  · Continue to Limit or Discontinue use of Narcotics, Benzos and Anti-cholinergic medications as they may worsen delirium.  · Continue medical workup for causative etiology of Delirium.     Thank you for having psychiatry participate in the care of this patient.  We will sign off.  Please contact us or call us should any other concerns arise.

## 2020-09-14 NOTE — SUBJECTIVE & OBJECTIVE
Past Medical History:   Diagnosis Date    Anxiety     Depression     Hypertension        History reviewed. No pertinent surgical history.    Review of patient's allergies indicates:   Allergen Reactions    Benadryl [diphenhydramine hcl]     Penicillins Other (See Comments)     unknown    Prednisone        No current facility-administered medications on file prior to encounter.      Current Outpatient Medications on File Prior to Encounter   Medication Sig    gabapentin (NEURONTIN) 100 MG capsule Take 100 mg by mouth 3 (three) times daily.    multivitamin (ONE DAILY MULTIVITAMIN) per tablet Take 1 tablet by mouth once daily.    QUEtiapine (SEROQUEL) 25 MG Tab Take 12.5 mg by mouth every evening.    sertraline (ZOLOFT) 25 MG tablet Take 25 mg by mouth once daily.     Continuous Infusions:   sodium chloride 0.9% 75 mL/hr at 09/14/20 1505       Family History     Problem Relation (Age of Onset)    Epilepsy Mother    No Known Problems Father        Tobacco Use    Smoking status: Current Every Day Smoker     Types: Cigarettes   Substance and Sexual Activity    Alcohol use: Yes    Drug use: Never    Sexual activity: Not Currently     Review of Systems   Constitutional: Negative for chills and fever.   Eyes: Negative for photophobia and redness.   Respiratory: Negative for cough and shortness of breath.    Cardiovascular: Negative for chest pain and leg swelling.   Gastrointestinal: Negative for nausea and vomiting.   Musculoskeletal: Positive for neck pain. Negative for neck stiffness.   Skin: Negative for pallor.   Neurological: Positive for seizures. Negative for dizziness, speech difficulty, weakness and headaches.   Psychiatric/Behavioral: Positive for hallucinations. Negative for agitation. The patient is not hyperactive.      Objective:     Vital Signs (Most Recent):  Temp: 97.8 °F (36.6 °C) (09/14/20 1505)  Pulse: 95 (09/14/20 1505)  Resp: 18 (09/14/20 1505)  BP: (!) 140/69 (09/14/20 1505)  SpO2: 99 %  (09/14/20 5185) Vital Signs (24h Range):  Temp:  [97.8 °F (36.6 °C)-99.7 °F (37.6 °C)] 97.8 °F (36.6 °C)  Pulse:  [] 95  Resp:  [18-42] 18  SpO2:  [96 %-100 %] 99 %  BP: (130-173)/(64-98) 140/69     Weight: 72.6 kg (160 lb)  Body mass index is 29.26 kg/m².    Physical Exam  Vitals signs and nursing note reviewed.   Constitutional:       Appearance: Normal appearance. She is normal weight.   HENT:      Head: Normocephalic.   Eyes:      Extraocular Movements: Extraocular movements intact.      Conjunctiva/sclera: Conjunctivae normal.      Pupils: Pupils are equal, round, and reactive to light.   Neck:      Musculoskeletal: Neck supple. No neck rigidity.   Cardiovascular:      Pulses: Normal pulses.   Pulmonary:      Effort: Pulmonary effort is normal. No respiratory distress.   Musculoskeletal:         General: No deformity.   Skin:     General: Skin is warm and dry.   Neurological:      Mental Status: She is alert and oriented to person, place, and time.         NEUROLOGICAL EXAMINATION:     MENTAL STATUS   Oriented to person, place, and time.   Attention: normal. Concentration: normal.   Level of consciousness: alert    CRANIAL NERVES     CN III, IV, VI   Pupils are equal, round, and reactive to light.    CN VII   Facial expression full, symmetric.     CN VIII   CN VIII normal.       Significant Labs: All pertinent lab results from the past 24 hours have been reviewed.    Significant Studies: I have reviewed all pertinent imaging results/findings within the past 24 hours.

## 2020-09-14 NOTE — ASSESSMENT & PLAN NOTE
59 yo female with no known history of seizures transferred from [facility] for management of recurrent episodes concerning for seizure. Started on Vimpat, Keppra, Onfi at OSH. EEG initiated 9/13>9/14 showing recurrent electroclinical seizures with clinical association of prominent left head/eye version.    Recommendations:   - Continue vEEG  - Add VPA - loading dose followed by 750 mg TID  - Check VPA level 2 hours after loading dose given  - Continue Onfi 10 mg daily, Vimpat 200 mg BID, Keppra 1000 mg BID  - LP completed 9/13, Protein 167, further CSF results pending. On empiric Acyclovir    Plan of care discussed with NCC team, patient at bedside. Will continue to [facility].

## 2020-09-14 NOTE — ASSESSMENT & PLAN NOTE
-- Continue Neuro checks q 1hr  -- Epilepsy Service consulted- recommend 2 mg of ativan as needed if Pt has catatonic like seizure.   -- Seizure Precautions  -- Continue vEEG  -- Continue cloBAZam Tab 10 mg, Vimpat 200 mg bid, and Keppra 1000 mg bid  -- concern for encephalitis due to infectious etiology- LP performed 9/13/20. Follow up infectious work-up. Consider acyclovir and antimicrobials if Neuro status does not improve or clinically Pt declines.   9/14/2020: follow EEG

## 2020-09-14 NOTE — PLAN OF CARE
PT Eval complete and POC established.    Pattie Sinha, PT, DPT  2020      Problem: Physical Therapy Goal  Goal: Physical Therapy Goal  Description: Goals to be met by: 2020    Patient will increase functional independence with mobility by performin. Pt will perform bed mobility (rolling L/R, scooting, and bridging) with Nikole.  2. Pt will perform supine to/from sit with Nikole.  3. Pt will sit EOB x 15 mins with B UE support with min assistance.  4. Pt will perform sit to stand with min assistance.  5. Pt will ambulate 50 feet with min assistance.  6. Pt will perform there-ex from handout x 15 reps to improve strength for functional mobility.  7. Pt will ascend/descend 2 steps with 1 HR and Nikole.        Outcome: Ongoing, Progressing

## 2020-09-14 NOTE — PLAN OF CARE
Problem: Occupational Therapy Goal  Goal: Occupational Therapy Goal  Description: Goals to be met by: 9/28/2020     Patient will increase functional independence with ADLs by performing:    Grooming while EOB with Minimal Assistance.  Toileting from bedside commode with Minimal Assistance for hygiene and clothing management.   Sitting at edge of bed x10 minutes with Contact Guard Assistance while engaging in functional tasks   Stand pivot transfers with Minimal Assistance.  Toilet transfer to bedside commode with Minimal Assistance.    Outcome: Ongoing, Progressing    Maxine Salguero, OTR/L  Pager: [phone]  9/14/2020

## 2020-09-14 NOTE — SUBJECTIVE & OBJECTIVE
Review of Systems  Review of Symptoms:  Constitutional: Denies fevers, weight loss, chills, or weakness.  Eyes: Denies changes in vision.  ENT: Denies dysphagia, nasal discharge, ear pain or discharge.  Cardiovascular: Denies chest pain, palpitations, orthopnea, or claudication.  Respiratory: Denies shortness of breath, cough, hemoptysis, or wheezing.  GI: Denies nausea/vomitting, hematochezia, melena, abd pain, or changes in appetite.  : Denies dysuria, incontinence, or hematuria.  Musculoskeletal: Denies joint pain or myalgias.  Skin/breast: Denies rashes, lumps, lesions, or discharge.  Neurologic: Denies headache, dizziness, vertigo, or paresthesias.  Psychiatric: Denies changes in mood or hallucinations.  Endocrine: Denies polyuria, polydipsia, heat/cold intolerance.  Hematologic/Lymph: Denies lymphadenopathy, easy bruising or easy bleeding.  Allergic/Immunologic: Denies rash, rhinitis.   Objective:     Vitals:  Temp: 99.6 °F (37.6 °C)  Pulse: (!) 111  Rhythm: normal sinus rhythm  BP: 130/70  MAP (mmHg): 94  Resp: (!) 39  SpO2: 100 %  O2 Device (Oxygen Therapy): room air    Temp  Min: 97.9 °F (36.6 °C)  Max: 99.6 °F (37.6 °C)  Pulse  Min: 81  Max: 111  BP  Min: 130/70  Max: 183/84  MAP (mmHg)  Min: 92  Max: 120  Resp  Min: 18  Max: 42  SpO2  Min: 96 %  Max: 100 %    09/13 0701 - 09/14 0700  In: 3611.3 [I.V.:2511.3]  Out: 3170 [Urine:3170]   Unmeasured Output  Stool Occurrence: 0       Physical Exam  GA:  comfortable, no acute distress.   HEENT: No scleral icterus or JVD.   Pulmonary: Clear to auscultation A/L. No wheezing, crackles, or rhonchi.  Cardiac: RRR S1 & S2 w/o rubs/murmurs/gallops.   Abdominal: Bowel sounds present x 4. No appreciable hepatosplenomegaly.  Skin: No jaundice, rashes, or visible lesions.  Neuro:  --GCS: E4 V5 M6  --Mental Status:  Awake, oriented X4, follows commands,  follows commands, intermittently confused  --CN II-XII grossly intact.   --Pupils mm, PERRL.   --Corneal reflex,  gag, cough intact.  --MCDANIEL spont and against gravity    Medications:  Continuoussodium chloride 0.9%, Last Rate: 75 mL/hr at 09/14/20 0600    Scheduledacyclovir, 10 mg/kg (Ideal), Q8H  aspirin, 81 mg, Daily  atorvastatin, 40 mg, Daily  cloBAZam, 10 mg, Daily  docusate sodium, 100 mg, BID  heparin (porcine), 5,000 Units, Q8H  lacosamide (VIMPAT) IVPB, 200 mg, Q12H  levetiracetam IVPB, 1,000 mg, Q12H  multivitamin, 1 tablet, Daily  sertraline, 25 mg, Daily  sodium chloride 0.9%, 3 mL, Q8H    PRNacetaminophen, 650 mg, Q6H PRN  acetaminophen, 650 mg, Q4H PRN  calcium gluconate IVPB, 1 g, PRN  calcium gluconate IVPB, 2 g, PRN  calcium gluconate IVPB, 3 g, PRN  labetalol, 10 mg, Q4H PRN  lorazepam, 2 mg, Q1H PRN  magnesium oxide, 800 mg, PRN  magnesium oxide, 800 mg, PRN  magnesium sulfate IVPB, 2 g, PRN  magnesium sulfate IVPB, 4 g, PRN  ondansetron, 4 mg, Q6H PRN  potassium chloride in water, 40 mEq, PRN    And  potassium chloride in water, 60 mEq, PRN    And  potassium chloride in water, 10 mEq, PRN  potassium chloride, 40 mEq, PRN  potassium chloride, 40 mEq, PRN  potassium chloride, 60 mEq, PRN  potassium, sodium phosphates, 2 packet, PRN  potassium, sodium phosphates, 2 packet, PRN  potassium, sodium phosphates, 2 packet, PRN  sodium chloride 0.9%, 10 mL, PRN  sodium phosphate IVPB, 15 mmol, PRN  sodium phosphate IVPB, 20.01 mmol, PRN  sodium phosphate IVPB, 30 mmol, PRN      Today I personally reviewed pertinent medications, lines/drains/airways, imaging, cardiology results, laboratory results,     Diet  Diet Adult Regular (IDDSI Level 7)

## 2020-09-14 NOTE — ASSESSMENT & PLAN NOTE
"- Patient reports intermittent hallucinations prior to current hospitalization, "seeing a person in the next room"  - Psychiatry following  - Follow up CSF results, infectious workup - concern for underlying encephalitis  "

## 2020-09-14 NOTE — PLAN OF CARE
"Admit Date:  9/11/2020  8:50 AM      Admit Diagnosis:  Hypokalemia [E87.6]  Encephalitis [G04.90]  Bilateral sciatica [M54.31, M54.32]  Stroke [I63.9]  Seizure disorder [G40.909]  Abnormal CT of brain [R90.89]  Encounter for imaging to screen for metal prior to MRI [Z13.89]  New onset seizure [R56.9]  Cervical disc disease [M50.90]  Benzodiazepine causing adverse effect in therapeutic use, initial encounter [T42.4X5A]  Severe episode of recurrent major depressive disorder, with psychotic features [F33.3]  Acute alteration in mental status [R41.82]  New onset seizure with abnormal neurological exam without head trauma [R56.9, R29.90]      CM met with patient in room for [place] Planning Assessment.  Patient is unable to answer questions.  Phone call to Brother, Paulino Arias, [phone].  Per brother, the patient lives alone in a single story house with 1-2 step(s) to enter and a ramp.    Per brother, the patient was independent with ADLS and used no dme for ambulation. Brother stated that the patient was admitted to [facility] twice in the past month for confusion and hallucinations, and once at [facility].  Per brother, the patient was discharged to a "Psych Hospital' from [facility].  Brother stated that the patient does not have any children and is not .  Brother stated that patient has no one to assist her and he works out of town and cannot assist.  Per brother, he is not sure that the patient will have a ride home.   Brother stated that the patient sleeps all day and is up all night.  Discharge Planning Booklet left in room for patient.  All questions addressed.  CM will follow for needs.           09/14/20 1142   Discharge Assessment   Assessment Type Discharge Planning Assessment   Confirmed/corrected address and phone number on facesheet? Yes   Assessment information obtained from? Caregiver  (Brother, Paulino Arias, [phone])   Expected Length of Stay (days) 6   Communicated " expected length of stay with patient/caregiver yes   Prior to hospitilization cognitive status: Alert/Oriented  (Per brother, the patient has been hosptialized 3 times in the last month for confusion)   Prior to hospitalization functional status: Independent   Current cognitive status: Unable to Assess   Current Functional Status: Needs Assistance   Facility Arrived From: [facility]   Lives With alone   Able to Return to Prior Arrangements other (see comments)  (александр)   Is patient able to care for self after discharge? Unable to determine at this time (comments)   Who are your caregiver(s) and their phone number(s)? BrotherPaulino, [phone]   Patient's perception of discharge disposition other (comments)  (александр)   Readmission Within the Last 30 Days lack of support;previous discharge plan unsuccessful   If yes, most recent facility name: [facility]   Patient currently being followed by outpatient case management? No   Patient currently receives any other outside agency services? No   Equipment Currently Used at Home none   Do you have any problems affording any of your prescribed medications? No   Is the patient taking medications as prescribed?   (александр)   Does the patient have transportation home? No   Does the patient receive services at the [facility]? No   Discharge Plan A Rehab   Discharge Plan B Psychiatric hospital   DME Needed Upon Discharge  other (see comments)  (tbd)   Patient/Family in Agreement with Plan unable to assess   Readmission Questionnaire   At the time of your discharge, did someone talk to you about what your health problems were?   (АЛЕКСАНДР/ Brother does not know, patient cannot answer)   At the time of discharge, did someone talk to you about what to watch out for regarding worsening of your health problem?   (АЛЕКСАНДР/ Brother does not know, patient cannot answer)   At the time of discharge, did someone talk to you about what to do if you experienced worsening of  "your health problem?   (АЛЕКСАНДР/ Brother does not know, patient cannot answer)   At the time of discharge, did someone talk to you about which medication to take when you left the hospital and which ones to stop taking?   (АЛЕКСАНДР/ Brother does not know, patient cannot answer)   At the time of discharge, did someone talk to you about when and where to follow up with a doctor after you left the hospital?   (АЛЕКСАНДР/ Brother does not know, patient cannot answer)   How often do you need to have someone help you when you read instructions, pamphlets, or other written material from your doctor or pharmacy?   (александр)   Do you have any problems affording any of  your prescribed medications? To be determined   Do you have problems obtaining/receiving your medications?   (АЛЕКСАНДР/ Brother does not know, patient cannot answer)   Does the patient have transportation to healthcare appointments? No   Living Arrangements house   Does the patient have family/friends to help with healtcare needs after discharge? no   Does your caregiver provide all the help you need? No   Are you currently feeling confused? Yes  (per chart)   Are you currently having problems thinking? Yes  (per chart)   Are you currently having memory problems? Yes  (per chart)   Have you felt down, depressed, or hopeless? Unable to Assess   Have you felt little interest or pleasure in doing things? Unable to assess   In the last 7 days, my sleep quality was: very poor  (Per brother, patient has "days and nights mixed up")                PCP:  Casey Richmond MD  [phone]        Pharmacy:    [address] [address]  Phone: [phone] Fax: [phone]        Emergency Contacts:  Extended Emergency Contact Information  Primary Emergency Contact: Hugo Paulino  Mobile Phone: [phone]  Relation: Brother  Preferred language: English   needed? No      Insurance:    Payor: " MEDICAID / Plan: MEDICAID OF [place] / Product Type: Government /   Per brother, patient has Medicare also        Ivory Khan RN, CCRN-K, [facility]  Neuro-Critical Care   X [phone]    09/14/2020  11:57 AM

## 2020-09-14 NOTE — PROGRESS NOTES
[facility]  Neurocritical Care  Progress Note    Admit Date: 9/11/2020  Service Date: 09/14/2020  Length of Stay: 3    Subjective:     Chief Complaint: New onset seizure with abnormal neurological exam without head trauma    History of Present Illness: Ms Arias is a 59 yo female with sig pmhx of depression with noncompliance of meds for a month, Anxiety, and HTN who presents to [facility] for a higher level of care for right CESILIA and R/O seizure. Patient also PEC on 9/11/20 at OSH for Hallucinations. Per OSH note pt had continued seizure activity occurring every 5 min and was treated with Keppra and Vimpat. Neurology attempted lumbar puncture x 2, but was unsuccessful. CT scan and MRI brain revealed increased signal in the right frontal lobe that Neurology thought might be consistent with seizure activity. Patient is currently AAO x 3 and has no neurological deficits on exam.         Hospital Course: 9/12/2020 Admit to [facility], EEG  09/13/2020: No epilepsy on EEG. Successful LP once Pt arrived on the floor. Pt with waxing and waning neuro exam. Upon arrival Pt able to answer questions and able to follow some commands.   9/14/2020: YOVANI, on EEG-         Review of Systems  Review of Symptoms:  Constitutional: Denies fevers, weight loss, chills, or weakness.  Eyes: Denies changes in vision.  ENT: Denies dysphagia, nasal discharge, ear pain or discharge.  Cardiovascular: Denies chest pain, palpitations, orthopnea, or claudication.  Respiratory: Denies shortness of breath, cough, hemoptysis, or wheezing.  GI: Denies nausea/vomitting, hematochezia, melena, abd pain, or changes in appetite.  : Denies dysuria, incontinence, or hematuria.  Musculoskeletal: Denies joint pain or myalgias.  Skin/breast: Denies rashes, lumps, lesions, or discharge.  Neurologic: Denies headache, dizziness, vertigo, or paresthesias.  Psychiatric: Denies changes in mood or hallucinations.  Endocrine: Denies polyuria, polydipsia,  heat/cold intolerance.  Hematologic/Lymph: Denies lymphadenopathy, easy bruising or easy bleeding.  Allergic/Immunologic: Denies rash, rhinitis.   Objective:     Vitals:  Temp: 99.6 °F (37.6 °C)  Pulse: (!) 111  Rhythm: normal sinus rhythm  BP: 130/70  MAP (mmHg): 94  Resp: (!) 39  SpO2: 100 %  O2 Device (Oxygen Therapy): room air    Temp  Min: 97.9 °F (36.6 °C)  Max: 99.6 °F (37.6 °C)  Pulse  Min: 81  Max: 111  BP  Min: 130/70  Max: 183/84  MAP (mmHg)  Min: 92  Max: 120  Resp  Min: 18  Max: 42  SpO2  Min: 96 %  Max: 100 %    09/13 0701 - 09/14 0700  In: 3611.3 [I.V.:2511.3]  Out: 3170 [Urine:3170]   Unmeasured Output  Stool Occurrence: 0       Physical Exam  GA:  comfortable, no acute distress.   HEENT: No scleral icterus or JVD.   Pulmonary: Clear to auscultation A/L. No wheezing, crackles, or rhonchi.  Cardiac: RRR S1 & S2 w/o rubs/murmurs/gallops.   Abdominal: Bowel sounds present x 4. No appreciable hepatosplenomegaly.  Skin: No jaundice, rashes, or visible lesions.  Neuro:  --GCS: E4 V5 M6  --Mental Status:  Awake, oriented X4, follows commands,  follows commands, intermittently confused  --CN II-XII grossly intact.   --Pupils mm, PERRL.   --Corneal reflex, gag, cough intact.  --MCDANIEL spont and against gravity    Medications:  Continuoussodium chloride 0.9%, Last Rate: 75 mL/hr at 09/14/20 0600    Scheduledacyclovir, 10 mg/kg (Ideal), Q8H  aspirin, 81 mg, Daily  atorvastatin, 40 mg, Daily  cloBAZam, 10 mg, Daily  docusate sodium, 100 mg, BID  heparin (porcine), 5,000 Units, Q8H  lacosamide (VIMPAT) IVPB, 200 mg, Q12H  levetiracetam IVPB, 1,000 mg, Q12H  multivitamin, 1 tablet, Daily  sertraline, 25 mg, Daily  sodium chloride 0.9%, 3 mL, Q8H    PRNacetaminophen, 650 mg, Q6H PRN  acetaminophen, 650 mg, Q4H PRN  calcium gluconate IVPB, 1 g, PRN  calcium gluconate IVPB, 2 g, PRN  calcium gluconate IVPB, 3 g, PRN  labetalol, 10 mg, Q4H PRN  lorazepam, 2 mg, Q1H PRN  magnesium oxide, 800 mg, PRN  magnesium oxide, 800  mg, PRN  magnesium sulfate IVPB, 2 g, PRN  magnesium sulfate IVPB, 4 g, PRN  ondansetron, 4 mg, Q6H PRN  potassium chloride in water, 40 mEq, PRN    And  potassium chloride in water, 60 mEq, PRN    And  potassium chloride in water, 10 mEq, PRN  potassium chloride, 40 mEq, PRN  potassium chloride, 40 mEq, PRN  potassium chloride, 60 mEq, PRN  potassium, sodium phosphates, 2 packet, PRN  potassium, sodium phosphates, 2 packet, PRN  potassium, sodium phosphates, 2 packet, PRN  sodium chloride 0.9%, 10 mL, PRN  sodium phosphate IVPB, 15 mmol, PRN  sodium phosphate IVPB, 20.01 mmol, PRN  sodium phosphate IVPB, 30 mmol, PRN      Today I personally reviewed pertinent medications, lines/drains/airways, imaging, cardiology results, laboratory results,     Diet  Diet Adult Regular (IDDSI Level 7)      Assessment/Plan:     Neuro  Acute right CESILIA stroke  -- Continue Neuro checks q 1hr  -- Vascular Neurology consulted-  -- MRI (9/12/20) - changes involving the right frontal lobe increased signal, cortical banding which can be seen with seizure activity. Given presentation stroke less likely.   -- SBP goal <220  -- PT/OT/Speech  -- ASA 81 mg daily, Atorvastatin 40 mg daily, SQH 5000 units q8h  -- OK to restart SQ heparin.     Abnormal CT of brain  -- See Acute right CESILIA stroke    Renal/  Hypokalemia  -- electrolyte replacement ordered    Other  * New onset seizure with abnormal neurological exam without head trauma  -- Continue Neuro checks q 1hr  -- Epilepsy Service consulted- recommend 2 mg of ativan as needed if Pt has catatonic like seizure.   -- Seizure Precautions  -- Continue vEEG  -- Continue cloBAZam Tab 10 mg, Vimpat 200 mg bid, and Keppra 1000 mg bid  -- concern for encephalitis due to infectious etiology- LP performed 9/13/20. Follow up infectious work-up. Consider acyclovir and antimicrobials if Neuro status does not improve or clinically Pt declines.   9/14/2020: follow EEG       Hallucinations  -- Consulted  Psyc  -- Sitter ordered          The patient is being Prophylaxed for:  Venous Thromboembolism with: Chemical  Stress Ulcer with: None  Ventilator Pneumonia with: not applicable    Activity Orders          Diet Adult Regular (IDDSI Level 7): Regular starting at 09/14 0702        Full Code    Sherry Carlson NP  Neurocritical Care  [facility]

## 2020-09-14 NOTE — SUBJECTIVE & OBJECTIVE
"Interval History: Included in hospital course    Family History     Problem Relation (Age of Onset)    Epilepsy Mother    No Known Problems Father        Tobacco Use    Smoking status: Current Every Day Smoker     Types: Cigarettes   Substance and Sexual Activity    Alcohol use: Yes    Drug use: Never    Sexual activity: Not Currently     Psychotherapeutics (From admission, onward)    Start     Stop Route Frequency Ordered    09/13/20 1826  lorazepam injection 2 mg      -- IV Every hour PRN 09/13/20 1726    09/12/20 0900  sertraline tablet 25 mg      -- Oral Daily 09/11/20 1915           Review of Systems  Objective:     Vital Signs (Most Recent):  Temp: 99.7 °F (37.6 °C) (09/14/20 1105)  Pulse: (!) 113 (09/14/20 1205)  Resp: (!) 24 (09/14/20 1205)  BP: 137/65 (09/14/20 1205)  SpO2: 98 % (09/14/20 1205) Vital Signs (24h Range):  Temp:  [97.9 °F (36.6 °C)-99.7 °F (37.6 °C)] 99.7 °F (37.6 °C)  Pulse:  [] 113  Resp:  [18-42] 24  SpO2:  [96 %-100 %] 98 %  BP: (130-183)/(64-98) 137/65     Height: 5' 2.01" (157.5 cm)  Weight: 72.6 kg (160 lb 0.9 oz)  Body mass index is 29.27 kg/m².      Intake/Output Summary (Last 24 hours) at 9/14/2020 1246  Last data filed at 9/14/2020 1205  Gross per 24 hour   Intake 4267.5 ml   Output 2170 ml   Net 2097.5 ml       Physical Exam    Mental Status Exam:  Appearance: unremarkable, age appropriate  Level of Consciousness: Alert and awake  Behavior/Cooperation: normal, cooperative  Psychomotor: psychomotor retardation   Speech: normal tone, normal pitch, normal volume, slowed, delayed  Language: english, fluid  Orientation: person, place, situation, time/date, year  Attention Span/Concentration: Spelled HOUSE forwards  Memory: Registers and recalls 3/3 objects at 5 minutes  Mood: "Sad"  Affect: blunted  Thought Process: linear, normal and logical  Associations: normal and logical  Thought Content: no suicidality, no homicidality, delusions, or paranoia, Reports some AH of "voices" " but does not tell what they are  Fund of Knowledge: Aware of current events  Abstraction: Unable to assess  Insight: Limited  Judgment: Limited       Significant Labs: All pertinent labs within the past 24 hours have been reviewed.    Significant Imaging: I have reviewed all pertinent imaging results/findings within the past 24 hours.

## 2020-09-14 NOTE — HPI
Ms. Arias is a 61 yo female with significant medical history of depression with noncompliance of meds for a month, anxiety, HTN who presented to [facility] as transfer from [facility] for higher level of care for right CESILIA cva and rule out seizure. Patient presented from [facility] for evaluation of activity suspicious for seizure. While at [facility]. Patient noted to have clinical activity suspicious for seizure with left head version, left eye deviation, and increased tone involving right upper extremity. Patient started on Keppra, Vimpat, and Onfi, transferred to [facility] for continuous EEG and higher level of care. EEG 9/13>9/14 showed recurrent electroclinical seizures characterized by evolution of sharply contoured slowing over the right anterior quadrant into rhythmic delta into theta activity with superimposed sharp/fast activity which abruptly ceases. Clinically associated with prominent left head/eye version. Neurology Epilepsy consulted for assistance in management.

## 2020-09-14 NOTE — PT/OT/SLP EVAL
Occupational Therapy   Evaluation    Name: Donna Arias  MRN: [id number]  Admitting Diagnosis:  Seizure disorder      Recommendations:     Discharge Recommendations: rehabilitation facility  Discharge Equipment Recommendations:  other (see comments)(TBD)  Barriers to discharge:  Decreased caregiver support, Other (Comment)(Pt requires increased assistance at current functional level)    Assessment:     Donna Arias is a 60 y.o. female with a medical diagnosis of Seizure disorder.  She presents with performance deficits affecting function: weakness, impaired endurance, impaired self care skills, impaired functional mobilty, gait instability, impaired balance, impaired cognition, decreased coordination, decreased upper extremity function, decreased lower extremity function, decreased safety awareness, pain, impaired cardiopulmonary response to activity, impaired fine motor, impaired coordination.  Pt performed supine to sit with max A and sit to supine with total A as pt began experiencing an active seizure while sitting EOB. Pt stopped responding to therapist with prominent head turn to the left, L gaze and posterior lean . Pt was returned back to bed for pt's safety. Once seizure ended, pt was unable to recall event. Pt perseverated on cervical neck pain, depression, hallucinations, and lack of sleep throughout session. Pt required redirection and attention to tasks during evaluation. OT recommending IP Rehab in order to further improve pt's overall functional independence with  functional mobility, transfers, and ADLs Pt would benefit from continued skilled acute OT services in order to maximize independence and safety with ADLs and functional mobility to ensure safe return to PLOF in the least restrictive environment.    Rehab Prognosis: Good; patient would benefit from acute skilled OT services to address these deficits and reach maximum level of function.       Plan:     Patient to be seen 4 x/week to address the  above listed problems via self-care/home management, therapeutic exercises, neuromuscular re-education, cognitive retraining  · Plan of Care Expires: 10/13/20  · Plan of Care Reviewed with: patient    Subjective     Chief Complaint: neck pain, hallucinations, lack of sleep, and depression   Patient/Family Comments/goals: to get better     Occupational Profile:  Living Environment: Pt was recently hospitalized at a psychiatric hospital for hallucination. Prior to hospitalizations, pt was living alone in a [facility] with 1-2 NARAYAN.  Previous level of function: PTA, pt was (I) with ADLs and functional mobility  Roles and Routines: home dweller   Equipment Used at Home:  none  Assistance upon Discharge: Pt does not have family or friend assistance upon d/c.     Pain/Comfort:  Pain Rating 1: 7/10  Location - Orientation 1: generalized  Location 1: neck  Pain Addressed 1: Distraction, Reposition, Cessation of Activity, Nurse notified  Pain Rating Post-Intervention 1: 7/10    Patients cultural, spiritual, Tenriism conflicts given the current situation: no    Objective:     Communicated with: RN prior to session.  Patient found HOB elevated with telemetry, EEG, PureWick, pulse ox (continuous), blood pressure cuff, peripheral IV upon OT entry to room. Pt agreeable to therapy session.       General Precautions: Standard, fall, seizure   Orthopedic Precautions:N/A   Braces: N/A     Occupational Performance:    Bed Mobility:    · Patient completed Rolling/Turning to Left with  maximal assistance and with side rail with verbal cues for hand placement and technique   · Patient completed Scooting/Bridging with total assistance and 2 persons for supine scooting towards HOB   · Patient completed Supine to Sit with maximal assistance, with side rail and verbal cues for technique   · Patient completed Sit to Supine with total assistance, 2 persons due to pt actively having seizure while sitting EOB     Functional  Mobility/Transfers:  · Deferred due to pt actively having seizure sitting EOB and became unresponsive. Pt transferred back to bed due to safety of the pt. Pt came to after a few seconds but unable to recall seizure event that occurred.     Activities of Daily Living:  · Not performed this date due to seizure event that occurred while sitting EOB     Cognitive/Visual Perceptual:  Cognitive/Psychosocial Skills:     -       Oriented to: Person, Place, Time and Situation   -       Follows Commands/attention:Inattentive, Easily distracted and Follows one -step commands  -       Communication: pt unable to sustain appropriate conversation, nonsensical speech at times, pt perseverating on hallucination, lack of sleep, depression, and neck pain   -       Memory: Poor immediate recall  -       Safety awareness/insight to disability: impaired   -       Mood/Affect/Coping skills/emotional control: Flat affect and Withdrawn  Visual/Perceptual:      -observed to be intact     Physical Exam:  Balance:    - Static/Dynamic sitting : initially CGA <>SBA but progressed to max A <>total A during seizure   - Static standing: unable to performed this date    Postural examination/scapula alignment:    -       Rounded shoulders   -     L cervical rotation and L gaze   Skin integrity: Visible skin intact  Edema:  None noted  Sensation:    -       Intact for B UEs  Dominant hand:    -       right   Upper Extremity Range of Motion:     -       Right Upper Extremity: WFL  -       Left Upper Extremity: WFL  Upper Extremity Strength:    -       Right Upper Extremity: grossly 4-/5   -       Left Upper Extremity: grossly 4-/5    Strength:    -       Right Upper Extremity: 4-/5   -       Left Upper Extremity: 4-/5   Fine Motor Coordination:    -       Intact  Left hand thumb/finger opposition skills and Right hand thumb/finger opposition skills    AMPAC 6 Click ADL:  AMPAC Total Score: 13    Treatment & Education:  - Pt educated on role of OT,  POC, and goals for therapy.    - Pt experienced seizure while sitting EOB deferring OOB mobility this date.   - Pt was AxOx4 during therapy session but persevered on hallucination, neck pain, depression, and lack of sleep.   - During seizure while sitting EOB, pt demonstrated significant posterior lean, L fixated gaze, and cervical rotation to the left (catatonic like state). Pt unable to recall event that occurred. RN notified immediately   - Time provided for therapeutic counseling and discussion of health disposition.   - Pt completed ADLs and functional mobility for treatment session as noted above   - Pt verbalized understanding. Pt expressed no further concerns/questions.  - whiteboard updated     Education:    Patient left HOB elevated with all lines intact, call button in reach and RN notified    GOALS:   Multidisciplinary Problems     Occupational Therapy Goals        Problem: Occupational Therapy Goal    Goal Priority Disciplines Outcome Interventions   Occupational Therapy Goal     OT, PT/OT Ongoing, Progressing    Description: Goals to be met by: 9/28/2020     Patient will increase functional independence with ADLs by performing:    Grooming while EOB with Minimal Assistance.  Toileting from bedside commode with Minimal Assistance for hygiene and clothing management.   Sitting at edge of bed x10 minutes with Contact Guard Assistance while engaging in functional tasks   Stand pivot transfers with Minimal Assistance.  Toilet transfer to bedside commode with Minimal Assistance.                     History:     Past Medical History:   Diagnosis Date    Anxiety     Depression     Hypertension        History reviewed. No pertinent surgical history.    Time Tracking:     OT Date of Treatment: 09/14/20  OT Start Time: 1130  OT Stop Time: 1148  OT Total Time (min): 18 min (co-eval with PT)    Billable Minutes:Evaluation 18    Maxine M Noemy, OT  9/14/2020

## 2020-09-14 NOTE — PROCEDURES
EEG prelim    07:00 a.m. - 17:30 p.m.    8-10 electroclinical seizures an hour despite loading an adequate dose of valproic acid.  Discussed findings and recommendations to escalate anticonvulsant medications with primary team.    Please continue to hit the button with prominent left head/left eye version.     Elizabeth Funes MD PhD  Neurology-Epilepsy  [facility].  [facility]

## 2020-09-14 NOTE — ASSESSMENT & PLAN NOTE
- MRI brain 9/12/20 showed restricted diffusion and edema throughout the right frontal lobe with sulcal effacement.  Findings concerning for acute infarction in the right CESILIA territory

## 2020-09-14 NOTE — PT/OT/SLP EVAL
Physical Therapy Evaluation    Patient Name:  Donna Arias   MRN:  [id number]    Recommendations:     Discharge Recommendations:  rehabilitation facility   Discharge Equipment Recommendations: none   Barriers to discharge: increased level of assistance needed, fall risk, inaccessible home    Assessment:     Donna Arias is a 60 y.o. female admitted with a medical diagnosis of Seizure disorder.  She presents with the following impairments/functional limitations:  weakness, impaired endurance, impaired self care skills, impaired functional mobilty, gait instability, impaired balance, impaired cognition, decreased upper extremity function, decreased lower extremity function, decreased coordination, decreased safety awareness, pain, impaired coordination, impaired fine motor. Session terminated early due to patient demonstrating seizure like activity. PT recommending Rehab upon DC from hospital. Pt perseverating and fixated on neck and vitals levels.    Pt would benefit from intensive collaborative rehabilitation for: Dynamic/static standing/sitting balance through skilled balance training, strengthening with the use of skilled therapeutic exercises interventions, mobility and safety training to ensure safe discharge home through skilled patient and caregiver education home management training, mobility through community re-integration training and mobility through adaptive equipment training. Pt highly motivated to return to independent PLOF and can tolerate 3+hours of therapy. Pt continues to benefit from a collaborative PT/OT/SLP program to improve quality of life and focus on recovery of impairments.      Rehab Prognosis: Good; patient would benefit from acute skilled PT services to address these deficits and reach maximum level of function.    Recent Surgery: * No surgery found *      Plan:     During this hospitalization, patient to be seen 4 x/week to address the identified rehab impairments via gait training,  "therapeutic activities, therapeutic exercises, neuromuscular re-education and progress toward the following goals:    · Plan of Care Expires:  10/14/20    Subjective     Chief Complaint: neck pain  Patient/Family Comments/goals: "are my levels good?" -  Question repeated multiple times  Pain/Comfort:  · Pain Rating 1: 1/10  · Location - Orientation 1: generalized  · Location 1: neck  · Pain Addressed 1: Reposition, Cessation of Activity, Distraction, Nurse notified  · Pain Rating Post-Intervention 1: other (see comments)(not rated, pain remained)    Patients cultural, spiritual, Pentec[employer]alism conflicts given the current situation: no    Living Environment: per SUNDAY Harvey) note on 9/14  "Phone call to Brother, Paulino Arias, [phone].  Per brother, the patient lives alone in a single story house with 1-2 step(s) to enter and a ramp.    Per brother, the patient was independent with ADLS and used no dme for ambulation. Brother stated that the patient was admitted to [facility] twice in the past month for confusion and hallucinations, and once at [facility].  Per brother, the patient was [facility] to a [facility] from [facility].  Brother stated that the patient does not have any children and is not .  Brother stated that patient has no one to assist her and he works out of town and cannot assist.  Per brother, he is not sure that the patient will have a ride home.   Brother stated that the patient sleeps all day and is up all night."    Objective:     Communicated with RN prior to session.  Patient found HOB elevated with bed alarm, blood pressure cuff, EEG, PureWick, peripheral IV, pulse ox (continuous), telemetry  upon PT entry to room.    General Precautions: Standard, fall, seizure   Orthopedic Precautions:N/A   Braces: N/A     Exams:  · Cognitive Exam:  Patient is oriented to Person  · Command follow: inconsistent with simple commands, frequent redirection required  · Fine Motor " Coordination:  SHRUTHI due to limited command following  · Gross Motor Coordination:  WFL  · Postural Exam:  Patient presented with the following abnormalities:    · -       Rounded shoulders  · -       Forward head  · Sensation:  · RLE: LT, WFL  · LLE: decreased sensation and tingling with LT  · RLE ROM: WFL  · RLE Strength: Deficits: hip flexion 2/5, all others grossly 3 to 3+/5  · LLE ROM: WFL  · LLE Strength:  Deficits: hip flexion 2/5, all others grossly 3 to 3+/5    Functional Mobility:  · Bed Mobility:     · Rolling Left:  maximal assistance  · Scooting to HOB via drawsheet: total assistance and of 2 persons  · Supine to Sit: maximal assistance  · Sit to Supine: total assistance x 2 persons  · Transfers:  Deferred 2/2 seizure-like activity  · EOB: Nikole-modA, but progressed to total A with seizure-like activity. Pt demonstrated L cervical rotation and L fixed gaze, became unresponsive to questions, and BUE cat up into flexed position. Pt immediately returned to supine. Pt began responding. RN notified and EEG button pressed.  · Balance:   · Static Sitting: Nikole   · Pt became max A to total A with posterior lean with seizure like activity  · Dynamic Sitting: Nikole to modA with MMTing    Therapeutic Activities and Exercises:  Reorientation provided.  Patient educated on role of therapy, goals of session, and benefits of mobilizing.   Discussed PT plan of care during hospitalization.   Patient educated on calling for assistance.   Patient educated on how their diagnosis impacts their mobility within PT scope of practice.   Communication board up to date.  All questions answered within PT scope of practice.      AM-PAC 6 CLICK MOBILITY  Total Score:8     Patient left HOB elevated with all lines intact, call button in reach, bed alarm on and RN notified.    GOALS:   Multidisciplinary Problems     Physical Therapy Goals        Problem: Physical Therapy Goal    Goal Priority Disciplines Outcome Goal Variances  Interventions   Physical Therapy Goal     PT, PT/OT Ongoing, Progressing     Description: Goals to be met by: 2020    Patient will increase functional independence with mobility by performin. Pt will perform bed mobility (rolling L/R, scooting, and bridging) with Nikole.  2. Pt will perform supine to/from sit with Nikole.  3. Pt will sit EOB x 15 mins with B UE support with min assistance.  4. Pt will perform sit to stand with min assistance.  5. Pt will ambulate 50 feet with min assistance.  6. Pt will perform there-ex from handout x 15 reps to improve strength for functional mobility.  7. Pt will ascend/descend 2 steps with 1 HR and Nikole.                         History:     Past Medical History:   Diagnosis Date    Anxiety     Depression     Hypertension        History reviewed. No pertinent surgical history.    Time Tracking:     PT Received On: 20  PT Start Time: 1131     PT Stop Time: 1153  PT Total Time (min): 22 min   Additional staffing present: OT    Billable Minutes: Evaluation 14 and Therapeutic Activity 8      Pattie Sinha, PT  2020

## 2020-09-14 NOTE — PLAN OF CARE
[facility] Care Plan    POC reviewed with Donna Arias and family at 1400. Pt verbalized understanding. Cont. EEG in place, multiple seizures noted, pt will turn head and maintain gaze to the left while still able to answer questions during seizures. Lacosamide, valproic acid, lorazepam initiated. Pt NPO for seizures. Bath given. Questions and concerns addressed. No acute events today. Pt progressing toward goals. Will continue to monitor. See below and flowsheets for full assessment and VS info.       Neuro:  [place] Coma Scale  Best Eye Response: 4-->(E4) spontaneous  Best Motor Response: 6-->(M6) obeys commands  Best Verbal Response: 4-->(V4) confused  [place] Coma Scale Score: 14  Assessment Qualifiers: patient not sedated/intubated, no eye obstruction present  Pupil PERRLA: yes     24 hr Temp:  [97.8 °F (36.6 °C)-99.7 °F (37.6 °C)]     CV:   Rhythm: normal sinus rhythm  BP goals:   SBP < 220  MAP > 65    Resp:   O2 Device (Oxygen Therapy): room air       Plan: N/A    GI/:  AVERY Total Score: 20  Diet/Nutrition Received: NPO  Last Bowel Movement: 09/12/20  Voiding Characteristics: external catheter, voids spontaneously without difficulty    Intake/Output Summary (Last 24 hours) at 9/14/2020 1840  Last data filed at 9/14/2020 1825  Gross per 24 hour   Intake 3300 ml   Output 3850 ml   Net -550 ml     Unmeasured Output  Stool Occurrence: 0    Labs/Accuchecks:  Recent Labs   Lab 09/14/20  0458   WBC 9.75   RBC 4.02   HGB 11.8*   HCT 36.7*         Recent Labs   Lab 09/14/20  0458      K 3.9   CO2 22*      BUN 8   CREATININE 0.6   ALKPHOS 93   ALT 25   AST 23   BILITOT 0.4      Recent Labs   Lab 09/12/20  1151  09/14/20  0458   INR 1.0   < > 1.0   APTT 26.2  --   --     < > = values in this interval not displayed.      Recent Labs   Lab 09/13/20  0107   CPK 75   CPKMB 1.2   TROPONINI <0.006   MB 1.6       Electrolytes: Electrolytes replaced  Accuchecks: Q6H    Gtts:   sodium chloride 0.9% 75 mL/hr at  09/14/20 1805       LDA/Wounds:  Lines/Drains/Airways       Drain              Female External Urinary Catheter 09/13/20 1700 1 day              Peripheral Intravenous Line                   Peripheral IV - Single Lumen 09/13/20 0656 24 G Left Hand 1 day         Peripheral IV - Single Lumen 09/14/20 1211 20 G Anterior;Right Forearm less than 1 day         Peripheral IV - Single Lumen 09/14/20 1334 18 G Right Antecubital less than 1 day                  Wounds: Yes  Wound care consulted: Yes

## 2020-09-14 NOTE — PLAN OF CARE
09/14/20 1709   Post-Acute Status   Post-Acute Authorization Placement   Post-Acute Placement Status Referrals Sent  (rehab)     RICKEY met with Pt at bedside. Discussed therapy recs for rehab and provided list. Addressed questions and reported need to send referrals to obtain accepting options. Pt agreeable and will review the list for preferences asap. She reported she has been to [facility] rehab at [facility]. Pt choice form signed and placed in chart.     RICKEY sent referrals via .    Kirsten Francisco LCSW  Neurocritical Care   [facility]  [id number]

## 2020-09-14 NOTE — CONSULTS
[facility]  Neurology-Epilepsy  Consult Note    Patient Name: Donna Arias  MRN: [id number]  Admission Date: 9/11/2020  Hospital Length of Stay: 3 days  Code Status: Full Code   Attending Provider: Balaji Kathleen MD   Consulting Provider: Lucille Ramos PA-C  Primary Care Physician: Casey Richmond MD  Principal Problem:Seizure disorder    Consults   Subjective:     HPI:   Ms. Arias is a 61 yo female with significant medical history of depression with noncompliance of meds for a month, anxiety, HTN who presented to [facility] as transfer from [facility] for higher level of care for right CESILIA cva and rule out seizure. Patient presented from [facility] for evaluation of activity suspicious for seizure. While at [facility]. Patient noted to have clinical activity suspicious for seizure with left head version, left eye deviation, and increased tone involving right upper extremity. Patient started on Keppra, Vimpat, and Onfi, transferred to [facility] for continuous EEG and higher level of care. EEG 9/13>9/14 showed recurrent electroclinical seizures characterized by evolution of sharply contoured slowing over the right anterior quadrant into rhythmic delta into theta activity with superimposed sharp/fast activity which abruptly ceases. Clinically associated with prominent left head/eye version. Neurology Epilepsy consulted for assistance in management.    Hospital Course:   No notes on file     Past Medical History:   Diagnosis Date    Anxiety     Depression     Hypertension        History reviewed. No pertinent surgical history.    Review of patient's allergies indicates:   Allergen Reactions    Benadryl [diphenhydramine hcl]     Penicillins Other (See Comments)     unknown    Prednisone        No current facility-administered medications on file prior to encounter.      Current Outpatient Medications on File Prior to Encounter   Medication Sig    gabapentin (NEURONTIN) 100 MG capsule  Take 100 mg by mouth 3 (three) times daily.    multivitamin (ONE DAILY MULTIVITAMIN) per tablet Take 1 tablet by mouth once daily.    QUEtiapine (SEROQUEL) 25 MG Tab Take 12.5 mg by mouth every evening.    sertraline (ZOLOFT) 25 MG tablet Take 25 mg by mouth once daily.     Continuous Infusions:   sodium chloride 0.9% 75 mL/hr at 09/14/20 1505       Family History     Problem Relation (Age of Onset)    Epilepsy Mother    No Known Problems Father        Tobacco Use    Smoking status: Current Every Day Smoker     Types: Cigarettes   Substance and Sexual Activity    Alcohol use: Yes    Drug use: Never    Sexual activity: Not Currently     Review of Systems   Constitutional: Negative for chills and fever.   Eyes: Negative for photophobia and redness.   Respiratory: Negative for cough and shortness of breath.    Cardiovascular: Negative for chest pain and leg swelling.   Gastrointestinal: Negative for nausea and vomiting.   Musculoskeletal: Positive for neck pain. Negative for neck stiffness.   Skin: Negative for pallor.   Neurological: Positive for seizures. Negative for dizziness, speech difficulty, weakness and headaches.   Psychiatric/Behavioral: Positive for hallucinations. Negative for agitation. The patient is not hyperactive.      Objective:     Vital Signs (Most Recent):  Temp: 97.8 °F (36.6 °C) (09/14/20 1505)  Pulse: 95 (09/14/20 1505)  Resp: 18 (09/14/20 1505)  BP: (!) 140/69 (09/14/20 1505)  SpO2: 99 % (09/14/20 1505) Vital Signs (24h Range):  Temp:  [97.8 °F (36.6 °C)-99.7 °F (37.6 °C)] 97.8 °F (36.6 °C)  Pulse:  [] 95  Resp:  [18-42] 18  SpO2:  [96 %-100 %] 99 %  BP: (130-173)/(64-98) 140/69     Weight: 72.6 kg (160 lb)  Body mass index is 29.26 kg/m².    Physical Exam  Vitals signs and nursing note reviewed.   Constitutional:       Appearance: Normal appearance. She is normal weight.   HENT:      Head: Normocephalic.   Eyes:      Extraocular Movements: Extraocular movements intact.       "Conjunctiva/sclera: Conjunctivae normal.      Pupils: Pupils are equal, round, and reactive to light.   Neck:      Musculoskeletal: Neck supple. No neck rigidity.   Cardiovascular:      Pulses: Normal pulses.   Pulmonary:      Effort: Pulmonary effort is normal. No respiratory distress.   Musculoskeletal:         General: No deformity.   Skin:     General: Skin is warm and dry.   Neurological:      Mental Status: She is alert and oriented to person, place, and time.         NEUROLOGICAL EXAMINATION:     MENTAL STATUS   Oriented to person, place, and time.   Attention: normal. Concentration: normal.   Level of consciousness: alert    CRANIAL NERVES     CN III, IV, VI   Pupils are equal, round, and reactive to light.    CN VII   Facial expression full, symmetric.     CN VIII   CN VIII normal.       Significant Labs: All pertinent lab results from the past 24 hours have been reviewed.    Significant Studies: I have reviewed all pertinent imaging results/findings within the past 24 hours.    Assessment and Plan:     * Seizure disorder  61 yo female with no known history of seizures transferred from [facility] for management of recurrent episodes concerning for seizure. Started on Vimpat, Keppra, Onfi at OSH. EEG initiated 9/13>9/14 showing recurrent electroclinical seizures with clinical association of prominent left head/eye version.    Recommendations:   - Continue vEEG  - Add VPA - loading dose followed by 750 mg TID  - Check VPA level 2 hours after loading dose given  - Continue Onfi 10 mg daily, Vimpat 200 mg BID, Keppra 1000 mg BID  - LP completed 9/13, Protein 167, further CSF results pending. On empiric Acyclovir    Plan of care discussed with NCC team, patient at bedside. Will continue to [facility].    Hallucinations  - Patient reports intermittent hallucinations prior to current hospitalization, "seeing a person in the next room"  - Psychiatry following  - Follow up CSF results, infectious workup - concern " for underlying encephalitis    Abnormal CT of brain  - MRI brain 9/12/20 showed restricted diffusion and edema throughout the right frontal lobe with sulcal effacement.  Findings concerning for acute infarction in the right CESILIA territory    Cervical disc disease  - Reports chronic neck pain         VTE Risk Mitigation (From admission, onward)         Ordered     heparin (porcine) injection 5,000 Units  Every 8 hours      09/12/20 1930     IP VTE LOW RISK PATIENT  Once      09/11/20 1915     Place sequential compression device  Until discontinued      09/11/20 1718                Thank you for your consult. I will follow-up with patient. Please contact us if you have any additional questions.    Lucille Ramos PA-C  Neurology-Epilepsy  [facility]  Staff: Dr. Funes

## 2020-09-14 NOTE — PLAN OF CARE
09/14/20 1143   Post-Acute Status   Post-Acute Authorization Placement   Post-Acute Placement Status Awaiting Internal Medical Clearance     Anticipated therapy eval/recs 9/15.    Kirsten Francisco LCSW  Neurocritical Care   [facility]  [id number]
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
## 2020-09-14 NOTE — PLAN OF CARE
Problem: SLP Goal  Goal: SLP Goal  Description: Speech Language Pathology Goals  Goals expected to be met by 9/21    1. Pt will tolerate regular solids and thin liquid diet with no overt clinical signs of airway compromise.  2. Pt will participate in SLC evaluation.          Outcome: Ongoing, Progressing   SLP completed bedside swallow eval. SLP recommends regular solids and thin liquids. SLP to follow up for SLC eval.  Krupa Mukherjee  9/14/2020

## 2020-09-14 NOTE — PT/OT/SLP EVAL
"Speech Language Pathology Evaluation  Bedside Swallow    Patient Name:  Donna Arias   MRN:  [id number]  Admitting Diagnosis: Seizure disorder    Recommendations:                 General Recommendations:  Speech language evaluation and Cognitive-linguistic evaluation  Diet recommendations:  Regular, Thin   Aspiration Precautions: Standard aspiration precautions   General Precautions: Standard,    Communication strategies:  none    History:     Past Medical History:   Diagnosis Date    Anxiety     Depression     Hypertension        History reviewed. No pertinent surgical history.    Modified Barium Swallow: none prior    Prior diet: regular/thin      Subjective     Pt awake/alert upon entry taking medication by mouth with nurse    Pain/Comfort:  Pain Rating 1: 0/10    Objective:     Oral Musculature Evaluation  Oral Musculature: WFL  Dentition: present and adequate  Secretion Management: adequate  Mucosal Quality: good  Mandibular Strength and Mobility: WFL  Oral Labial Strength and Mobility: WFL    Bedside Swallow Eval:   Consistencies Assessed:  · Thin liquids via straw x5  · Solids x2   · Pills x1     Oral Phase:   · WFL   · Pt with adequate bolus acceptance, containment, and control    Pharyngeal Phase:   · no overt clinical signs/symptoms of aspiration   · Pt with adequate hyolaryngeal excursion and prompt swallow initiation    Compensatory Strategies  · None    Treatment: Pt observed taking medication whole with nurse upon entry. During admission of final pill pt with head turn to left side, left gaze and blank stare suddenly; however pt able to respond to SLP and swallow a sip of water without difficulty. She stated "I swallowed it" while continuing with L fied eye gaze. POc discussed with pt and RN. SLP to follow up for SLC eval.    Assessment:     Donna Arias is a 60 y.o. female with an SLP diagnosis of Dysphagia    Goals:   Multidisciplinary Problems     SLP Goals        Problem: SLP Goal    Goal Priority " Disciplines Outcome   SLP Goal     SLP Ongoing, Progressing   Description: Speech Language Pathology Goals  Goals expected to be met by 9/21    1. Pt will tolerate regular solids and thin liquid diet with no overt clinical signs of airway compromise.  2. Pt will participate in SLC evaluation.                           Plan:     · Patient to be seen:  4 x/week   · Plan of Care expires:  10/13/20  · Plan of Care reviewed with:  patient   · SLP Follow-Up:  Yes         Time Tracking:     SLP Treatment Date:   09/14/20  Speech Start Time:  0836  Speech Stop Time:  0846     Speech Total Time (min):  10 min    Billable Minutes: Eval Swallow and Oral Function 10    SONJA Mak  09/14/2020

## 2020-09-15 LAB
ALBUMIN SERPL BCP-MCNC: 2.8 G/DL (ref 3.5–5.2)
ALP SERPL-CCNC: 67 U/L (ref 55–135)
ALT SERPL W/O P-5'-P-CCNC: 15 U/L (ref 10–44)
ANION GAP SERPL CALC-SCNC: 10 MMOL/L (ref 8–16)
AST SERPL-CCNC: 15 U/L (ref 10–40)
BASOPHILS # BLD AUTO: 0.06 K/UL (ref 0–0.2)
BASOPHILS NFR BLD: 0.6 % (ref 0–1.9)
BILIRUB SERPL-MCNC: 0.3 MG/DL (ref 0.1–1)
BUN SERPL-MCNC: 5 MG/DL (ref 6–20)
CALCIUM SERPL-MCNC: 7.3 MG/DL (ref 8.7–10.5)
CHLORIDE SERPL-SCNC: 112 MMOL/L (ref 95–110)
CO2 SERPL-SCNC: 24 MMOL/L (ref 23–29)
CREAT SERPL-MCNC: 0.6 MG/DL (ref 0.5–1.4)
DIFFERENTIAL METHOD: ABNORMAL
EOSINOPHIL # BLD AUTO: 0.3 K/UL (ref 0–0.5)
EOSINOPHIL NFR BLD: 3.3 % (ref 0–8)
ERYTHROCYTE [DISTWIDTH] IN BLOOD BY AUTOMATED COUNT: 14.4 % (ref 11.5–14.5)
EST. GFR  (AFRICAN AMERICAN): >60 ML/MIN/1.73 M^2
EST. GFR  (NON AFRICAN AMERICAN): >60 ML/MIN/1.73 M^2
GLUCOSE SERPL-MCNC: 90 MG/DL (ref 70–110)
HCT VFR BLD AUTO: 31.7 % (ref 37–48.5)
HGB BLD-MCNC: 10.1 G/DL (ref 12–16)
IMM GRANULOCYTES # BLD AUTO: 0.03 K/UL (ref 0–0.04)
IMM GRANULOCYTES NFR BLD AUTO: 0.3 % (ref 0–0.5)
INR PPP: 1 (ref 0.8–1.2)
LYMPHOCYTES # BLD AUTO: 2.2 K/UL (ref 1–4.8)
LYMPHOCYTES NFR BLD: 21.7 % (ref 18–48)
MAGNESIUM SERPL-MCNC: 1.7 MG/DL (ref 1.6–2.6)
MCH RBC QN AUTO: 29.4 PG (ref 27–31)
MCHC RBC AUTO-ENTMCNC: 31.9 G/DL (ref 32–36)
MCV RBC AUTO: 92 FL (ref 82–98)
MONOCYTES # BLD AUTO: 0.5 K/UL (ref 0.3–1)
MONOCYTES NFR BLD: 5.3 % (ref 4–15)
NEUTROPHILS # BLD AUTO: 6.9 K/UL (ref 1.8–7.7)
NEUTROPHILS NFR BLD: 68.8 % (ref 38–73)
NRBC BLD-RTO: 0 /100 WBC
PHOSPHATE SERPL-MCNC: 3.3 MG/DL (ref 2.7–4.5)
PLATELET # BLD AUTO: 311 K/UL (ref 150–350)
PMV BLD AUTO: 9.1 FL (ref 9.2–12.9)
POCT GLUCOSE: 160 MG/DL (ref 70–110)
POCT GLUCOSE: 161 MG/DL (ref 70–110)
POCT GLUCOSE: 85 MG/DL (ref 70–110)
POCT GLUCOSE: 93 MG/DL (ref 70–110)
POCT GLUCOSE: 96 MG/DL (ref 70–110)
POCT GLUCOSE: 98 MG/DL (ref 70–110)
POCT GLUCOSE: 98 MG/DL (ref 70–110)
POCT GLUCOSE: 99 MG/DL (ref 70–110)
POTASSIUM SERPL-SCNC: 2.8 MMOL/L (ref 3.5–5.1)
POTASSIUM SERPL-SCNC: 4.8 MMOL/L (ref 3.5–5.1)
PROT SERPL-MCNC: 5.2 G/DL (ref 6–8.4)
PROTHROMBIN TIME: 11.2 SEC (ref 9–12.5)
RBC # BLD AUTO: 3.43 M/UL (ref 4–5.4)
SODIUM SERPL-SCNC: 146 MMOL/L (ref 136–145)
VALPROATE SERPL-MCNC: 112.3 UG/ML (ref 50–100)
WBC # BLD AUTO: 10.08 K/UL (ref 3.9–12.7)

## 2020-09-15 PROCEDURE — 95720 EEG PHY/QHP EA INCR W/VEEG: CPT | Mod: ,,, | Performed by: PSYCHIATRY & NEUROLOGY

## 2020-09-15 PROCEDURE — 84132 ASSAY OF SERUM POTASSIUM: CPT

## 2020-09-15 PROCEDURE — 84100 ASSAY OF PHOSPHORUS: CPT

## 2020-09-15 PROCEDURE — 99233 SBSQ HOSP IP/OBS HIGH 50: CPT | Mod: ,,, | Performed by: NURSE PRACTITIONER

## 2020-09-15 PROCEDURE — 63600175 PHARM REV CODE 636 W HCPCS: Performed by: STUDENT IN AN ORGANIZED HEALTH CARE EDUCATION/TRAINING PROGRAM

## 2020-09-15 PROCEDURE — 97802 MEDICAL NUTRITION INDIV IN: CPT

## 2020-09-15 PROCEDURE — 25000003 PHARM REV CODE 250: Performed by: NURSE PRACTITIONER

## 2020-09-15 PROCEDURE — 63600175 PHARM REV CODE 636 W HCPCS: Performed by: NURSE PRACTITIONER

## 2020-09-15 PROCEDURE — 95714 VEEG EA 12-26 HR UNMNTR: CPT

## 2020-09-15 PROCEDURE — 25000003 PHARM REV CODE 250: Performed by: PSYCHIATRY & NEUROLOGY

## 2020-09-15 PROCEDURE — A4216 STERILE WATER/SALINE, 10 ML: HCPCS | Performed by: NURSE PRACTITIONER

## 2020-09-15 PROCEDURE — 63600175 PHARM REV CODE 636 W HCPCS: Performed by: PSYCHIATRY & NEUROLOGY

## 2020-09-15 PROCEDURE — 25000003 PHARM REV CODE 250: Performed by: STUDENT IN AN ORGANIZED HEALTH CARE EDUCATION/TRAINING PROGRAM

## 2020-09-15 PROCEDURE — 85025 COMPLETE CBC W/AUTO DIFF WBC: CPT

## 2020-09-15 PROCEDURE — 99233 SBSQ HOSP IP/OBS HIGH 50: CPT | Mod: ,,, | Performed by: PSYCHIATRY & NEUROLOGY

## 2020-09-15 PROCEDURE — 80164 ASSAY DIPROPYLACETIC ACD TOT: CPT

## 2020-09-15 PROCEDURE — 94761 N-INVAS EAR/PLS OXIMETRY MLT: CPT

## 2020-09-15 PROCEDURE — 85610 PROTHROMBIN TIME: CPT

## 2020-09-15 PROCEDURE — 92523 SPEECH SOUND LANG COMPREHEN: CPT

## 2020-09-15 PROCEDURE — 27000221 HC OXYGEN, UP TO 24 HOURS

## 2020-09-15 PROCEDURE — C9254 INJECTION, LACOSAMIDE: HCPCS | Performed by: NURSE PRACTITIONER

## 2020-09-15 PROCEDURE — 99233 PR SUBSEQUENT HOSPITAL CARE,LEVL III: ICD-10-PCS | Mod: ,,, | Performed by: NURSE PRACTITIONER

## 2020-09-15 PROCEDURE — 80053 COMPREHEN METABOLIC PANEL: CPT

## 2020-09-15 PROCEDURE — 95720 PR EEG, W/VIDEO, CONT RECORD, I&R, >12<26 HRS: ICD-10-PCS | Mod: ,,, | Performed by: PSYCHIATRY & NEUROLOGY

## 2020-09-15 PROCEDURE — 99233 PR SUBSEQUENT HOSPITAL CARE,LEVL III: ICD-10-PCS | Mod: ,,, | Performed by: PSYCHIATRY & NEUROLOGY

## 2020-09-15 PROCEDURE — 20000000 HC ICU ROOM

## 2020-09-15 PROCEDURE — 83735 ASSAY OF MAGNESIUM: CPT

## 2020-09-15 RX ORDER — CLOBAZAM 10 MG/1
20 TABLET ORAL 2 TIMES DAILY
Status: DISCONTINUED | OUTPATIENT
Start: 2020-09-15 | End: 2020-09-18

## 2020-09-15 RX ORDER — NAPROXEN SODIUM 220 MG/1
81 TABLET, FILM COATED ORAL DAILY
Status: DISCONTINUED | OUTPATIENT
Start: 2020-09-15 | End: 2020-09-18

## 2020-09-15 RX ORDER — DOCUSATE SODIUM 50 MG/5ML
100 LIQUID ORAL DAILY
Status: DISCONTINUED | OUTPATIENT
Start: 2020-09-15 | End: 2020-09-18

## 2020-09-15 RX ORDER — SERTRALINE HYDROCHLORIDE 25 MG/1
25 TABLET, FILM COATED ORAL DAILY
Status: DISCONTINUED | OUTPATIENT
Start: 2020-09-16 | End: 2020-09-18

## 2020-09-15 RX ORDER — ATORVASTATIN CALCIUM 20 MG/1
40 TABLET, FILM COATED ORAL DAILY
Status: DISCONTINUED | OUTPATIENT
Start: 2020-09-16 | End: 2020-09-18

## 2020-09-15 RX ADMIN — ACETAMINOPHEN 650 MG: 325 TABLET ORAL at 09:09

## 2020-09-15 RX ADMIN — MAGNESIUM SULFATE IN WATER 2 G: 40 INJECTION, SOLUTION INTRAVENOUS at 03:09

## 2020-09-15 RX ADMIN — LEVETIRACETAM 2000 MG: 100 INJECTION, SOLUTION, CONCENTRATE INTRAVENOUS at 08:09

## 2020-09-15 RX ADMIN — SODIUM CHLORIDE 75 ML/HR: 0.9 INJECTION, SOLUTION INTRAVENOUS at 01:09

## 2020-09-15 RX ADMIN — ATORVASTATIN CALCIUM 40 MG: 20 TABLET, FILM COATED ORAL at 08:09

## 2020-09-15 RX ADMIN — SERTRALINE 25 MG: 25 TABLET, FILM COATED ORAL at 08:09

## 2020-09-15 RX ADMIN — ASPIRIN 81 MG: 81 TABLET, COATED ORAL at 08:09

## 2020-09-15 RX ADMIN — SODIUM CHLORIDE 200 MG: 0.9 INJECTION, SOLUTION INTRAVENOUS at 11:09

## 2020-09-15 RX ADMIN — VALPROATE SODIUM 750 MG: 100 INJECTION, SOLUTION INTRAVENOUS at 08:09

## 2020-09-15 RX ADMIN — HEPARIN SODIUM 5000 UNITS: 5000 INJECTION INTRAVENOUS; SUBCUTANEOUS at 05:09

## 2020-09-15 RX ADMIN — ACYCLOVIR SODIUM 500 MG: 1000 INJECTION, SOLUTION INTRAVENOUS at 01:09

## 2020-09-15 RX ADMIN — Medication 3 ML: at 06:09

## 2020-09-15 RX ADMIN — THERA TABS 1 TABLET: TAB at 08:09

## 2020-09-15 RX ADMIN — ACYCLOVIR SODIUM 500 MG: 1000 INJECTION, SOLUTION INTRAVENOUS at 09:09

## 2020-09-15 RX ADMIN — VALPROATE SODIUM 750 MG: 100 INJECTION, SOLUTION INTRAVENOUS at 11:09

## 2020-09-15 RX ADMIN — HEPARIN SODIUM 5000 UNITS: 5000 INJECTION INTRAVENOUS; SUBCUTANEOUS at 09:09

## 2020-09-15 RX ADMIN — CLOBAZAM 20 MG: 10 TABLET ORAL at 08:09

## 2020-09-15 RX ADMIN — ACYCLOVIR SODIUM 500 MG: 1000 INJECTION, SOLUTION INTRAVENOUS at 06:09

## 2020-09-15 RX ADMIN — POTASSIUM CHLORIDE 60 MEQ: 1.5 POWDER, FOR SOLUTION ORAL at 03:09

## 2020-09-15 RX ADMIN — POTASSIUM CHLORIDE 60 MEQ: 1.5 POWDER, FOR SOLUTION ORAL at 05:09

## 2020-09-15 RX ADMIN — Medication 3 ML: at 10:09

## 2020-09-15 RX ADMIN — Medication 3 ML: at 01:09

## 2020-09-15 RX ADMIN — HEPARIN SODIUM 5000 UNITS: 5000 INJECTION INTRAVENOUS; SUBCUTANEOUS at 01:09

## 2020-09-15 RX ADMIN — VALPROATE SODIUM 750 MG: 100 INJECTION, SOLUTION INTRAVENOUS at 04:09

## 2020-09-15 NOTE — NURSING
Pt turned neck to left side and maintained position suggesting seizure, spO2 dropped from 99% to 82%, red button on EEG pressed, pt woken up and 2L NC applied, sats came up to 99%, pt aaox4. Will continue to monitor and notify as needed.

## 2020-09-15 NOTE — PROCEDURES
[facility] EEG/VIDEO MONITORING REPORT  Donna Arias  [id number]  1960    DATE OF SERVICE:  09/14/2020  DATE OF ADMISSION: 9/11/2020  8:50 AM    ADMITTING/REQUESTING PROVIDER: Irwin Akers MD    REASON FOR CONSULT:  60-year-old woman with presumed right CESILIA stroke and recurrent episodes of forced left head and eye gaze.  Evaluate for evidence of epileptiform activity.    METHODOLOGY   Electroencephalographic (EEG) recording is with electrodes placed according to the International 10-20 placement system.  Thirty two (32) channels of digital signal (sampling rate of 512/sec) including T1 and T2 was simultaneously recorded from the scalp and may include  EKG, EMG, and/or eye monitors.  Recording band pass was 0.1 to 512 hz.  Digital video recording of the patient is simultaneously recorded with the EEG.  The patient is instructed report clinical symptoms which may occur during the recording session.  EEG and video recording is stored and archived in digital format.  Activation procedures which include photic stimulation, hyperventilation and instructing patients to perform simple task are done in selected patients.   The EEG is displayed on a monitor screen and can be reviewed using different montages.  Computer assisted analysis is employed to detect spike and electrographic seizure activity.   The entire record is submitted for computer analysis.  The entire recording is visually reviewed and the times identified by computer analysis as being spikes or seizures are reviewed again.  Compresses spectral analysis (CSA) is also performed on the activity recorded from each individual channel.  This is displayed as a power display of frequencies from 0 to 30 Hz over time.   The CSA is reviewed looking for asymmetries in power between homologous areas of the scalp and then compared with the original EEG recording.     [employer] software is also utilized in the review of this study.  This software suite analyzes the EEG  recording in multiple domains.  Coherence and rhythmicity is computed to identify EEG sections which may contain organized seizures.  Each channel undergoes analysis to detect presence of spike and sharp waves which have special and morphological characteristic of epileptic activity.  The routine EEG recording is converted from spacial into frequency domain.  This is then displayed comparing homologous areas to identify areas of significant asymmetry.  Algorithm to identify non-cortically generated artifact is used to separate eye movement, EMG and other artifact from the EEG.      RECORDING TIMES  Start on 09/14/2020 at 07:00 a.m.  Stop on 09/15/2020 at 07:00 a.m.  A total of 23 hr and 50 min of EEG recording is obtained.    EEG FINDINGS  Background activity:   The background is continuous, disorganized, and asymmetric.  Over the left hemisphere, there is a mixture of prominent delta activity, often occurring in long rhythmic runs, with overriding faster frequencies.  On the right, there is sharply contoured slowing most prominent at F4/F8.  Multiple times an hour (8-10), there is evolution of this activity with the development of higher amplitude, rhythmic delta -> theta activity with superimposed fast/sharp activity which then abruptly ceases.  During these periods, the patient has a prominent left head turn with left eye version.  After these episodes cease, the patient often returns her head to midline.  There are numerous pushbutton activations from 11:46 a.m. until 16:22 p.m. for clinical episodes of prominent left head and left eye version which appear consistent with epileptic seizures.  At 13:02 p.m., the patient is loaded with 2.9 g of valproic acid with only a minimal decrease in the number of electroclinical seizures.  The patient is given an additional 400 mg IV lacosamide and 2 mg IV lorazepam, after which there are no additional electroclinical seizures until 06:27 a.m. However, during this period,  there are long runs of sharply contoured slowing over the right anterior frontal region often with embedded spike and wave discharges at approximately 1 hz or superimposed fluctuating fast/sharp activity.  There are no additional pushbutton activations for prominent left head turn. However, on the video, the patient is noted to have a prominent left head turn with the final seizure of this recording session at 06:27 a.m.    Sleep:  There is poorly formed sleep architecture    Activation procedures:   Hyperventilation is not performed  Photic stimulation is not performed    Cardiac Monitor:   Heart rate appears generally regular on a single lead EKG.    Impression:   This is an abnormal continuous EEG monitoring study because of recurrent electroclinical seizures characterized by prominent left head and eye version approximately 8-10 times per hour.  This decreases some with 2.9 g of valproic acid.  However, there is a more sustained break in the electroclinical seizure activity after an additional 400 mg IV lacosamide and 2 mg IV lorazepam is administered at approximately 18:30 p.m.  Although there are prominent runs of epileptiform discharges, often with superimposed fluctuating fast/sharp activity in the right frontotemporal region, there are no electroclinical seizures up until a single seizure at 06:27 a.m. right before the end of this recording session.    LTM Summary 09/13/2020 - TBD:  Patient events/Seizures:   recurrent electroclinical seizures characterized by evolution of sharply contoured slowing over the right anterior quadrant into rhythmic delta -> theta activity with superimposed sharp/fast activity which abruptly ceases.  Clinically, this activity is associated with prominent left head/eye version.  Slight decrease frequency and duration of seizures after loading valproic acid.  Prolonged, but not sustained, interruption in seizures after an additional load of 400 mg lacosamide in 2 mg  lorazepam.  Interictal findings:  long runs of sharply contoured slowing over the right anterior frontal region often with embedded spike and wave discharges at approximately 1 hz or superimposed fluctuating fast/sharp activity.  Other notable abnormalities:  disorganized asynchronous slowing, right>left    Elizabeth Funes MD PhD  Neurology-Epilepsy  [facility].  [facility]

## 2020-09-15 NOTE — PLAN OF CARE
[facility] Care Plan    POC reviewed with Donna Arias and family at 1400. Pt verbalized understanding. Cont. EEG in place, 2 witnessed seizures noted. Questions and concerns addressed. No acute events today. Pt progressing toward goals. Will continue to monitor. See below and flowsheets for full assessment and VS info.       Neuro:  [place] Coma Scale  Best Eye Response: 4-->(E4) spontaneous  Best Motor Response: 6-->(M6) obeys commands  Best Verbal Response: 4-->(V4) confused  Eben Coma Scale Score: 14  Assessment Qualifiers: no eye obstruction present, patient chemically sedated or paralyzed  Pupil PERRLA: yes     24 hr Temp:  [97.9 °F (36.6 °C)-98.6 °F (37 °C)]     CV:   Rhythm: normal sinus rhythm  BP goals:   SBP < 220  MAP > 65    Resp:   O2 Device (Oxygen Therapy): nasal cannula  Oxygen Concentration (%): 28    Plan: N/A    GI/:  AVERY Total Score: 20  Diet/Nutrition Received: NPO  Last Bowel Movement: 09/14/20  Voiding Characteristics: external catheter, voids spontaneously without difficulty    Intake/Output Summary (Last 24 hours) at 9/15/2020 1738  Last data filed at 9/15/2020 1705  Gross per 24 hour   Intake 3928 ml   Output 3450 ml   Net 478 ml     Unmeasured Output  Urine Occurrence: 1  Stool Occurrence: 0  Emesis Occurrence: 0  Pad Count: 1    Labs/Accuchecks:  Recent Labs   Lab 09/15/20  0101   WBC 10.08   RBC 3.43*   HGB 10.1*   HCT 31.7*         Recent Labs   Lab 09/15/20  0101 09/15/20  0811   *  --    K 2.8* 4.8   CO2 24  --    *  --    BUN 5*  --    CREATININE 0.6  --    ALKPHOS 67  --    ALT 15  --    AST 15  --    BILITOT 0.3  --       Recent Labs   Lab 09/12/20  1151  09/15/20  0101   INR 1.0   < > 1.0   APTT 26.2  --   --     < > = values in this interval not displayed.      Recent Labs   Lab 09/13/20  0107   CPK 75   CPKMB 1.2   TROPONINI <0.006   MB 1.6       Electrolytes: Electrolytes replaced  Accuchecks: Q6H    Gtts:   sodium chloride 0.9% 75 mL/hr at 09/15/20 2289        LDA/Wounds:  Lines/Drains/Airways       Drain              Female External Urinary Catheter 09/13/20 1700 2 days         NG/OG Tube 09/14/20 2159 nasogastric 14 Fr. Right nostril less than 1 day              Peripheral Intravenous Line                   Peripheral IV - Single Lumen 09/13/20 0656 24 G Left Hand 2 days         Peripheral IV - Single Lumen 09/14/20 1211 20 G Anterior;Right Forearm 1 day         Peripheral IV - Single Lumen 09/14/20 1334 18 G Right Antecubital 1 day                  Wounds: Yes  Wound care consulted: Yes

## 2020-09-15 NOTE — PROGRESS NOTES
[facility]  Neurology-Epilepsy  Progress Note    Patient Name: Donna Arias  MRN: [id number]  Admission Date: 9/11/2020  Hospital Length of Stay: 4 days  Code Status: Full Code   Attending Provider: Balaji Kathleen MD  Primary Care Physician: Casey Richmond MD   Principal Problem:Seizure disorder    Subjective:     Hospital Course:   9/13>9/14: recurrent electroclinical seizures characterized by evolution of sharply contoured slowing over the right anterior quadrant into rhythmic delta -> theta activity with superimposed sharp/fast activity which abruptly ceases. Clinically, this activity is associated with prominent left head/eye version  9/14>9/15: Recurrent seizures throughout the day 9/14, anticonvulsant medications escalated. No seizures from approximately 18:24 on 9/14 - 0627 on 9/15 after 2 mg Ativan, Vimpat 400 mg x1. Four electroclinical seizures noted throughout the morning 9/15 (from 8613-2248), however overall frequency appears to be improving.    Interval History: Drowsy this morning, however arousable to verbal stimuli and following commands. Seizure frequency overall improving. Continue current AED regimen, close EEG monitoring.    Current Facility-Administered Medications   Medication Dose Route Frequency Provider Last Rate Last Dose    0.9%  NaCl infusion   Intravenous Continuous Andres P. Andras, NP 75 mL/hr at 09/15/20 1405      acetaminophen suppository 650 mg  650 mg Rectal Q6H PRN Andres P. Andras, NP        acetaminophen tablet 650 mg  650 mg Oral Q4H PRN Casandra Noyola NP   650 mg at 09/14/20 1110    acyclovir (ZOVIRAX) 500 mg in dextrose 5 % 100 mL IVPB  10 mg/kg (Ideal) Intravenous Q8H Irwin Akers  mL/hr at 09/15/20 1344 500 mg at 09/15/20 1344    aspirin chewable tablet 81 mg  81 mg Per NG tube Daily Luigi Andrews MD   Stopped at 09/15/20 1045    [START ON 9/16/2020] atorvastatin tablet 40 mg  40 mg Per NG tube Daily Luigi Andrews MD         calcium gluconate 1g in dextrose 5% 100mL (ready to mix system)  1 g Intravenous PRN Gregory Ortega MD        cloBAZam Tab 20 mg  20 mg Per NG tube BID Luigi Andrews MD        docusate 50 mg/5 mL liquid 100 mg  100 mg Per NG tube Daily Luigi Andrews MD        heparin (porcine) injection 5,000 Units  5,000 Units Subcutaneous Q8H Andres P. Andmaurizio, NP   5,000 Units at 09/15/20 1346    labetalol 20 mg/4 mL (5 mg/mL) IV syring  10 mg Intravenous Q4H PRN Andres P. Andras, NP        lacosamide (VIMPAT) 200 mg in sodium chloride 0.9% 100 mL IVPB  200 mg Intravenous Q12H Casandra Noyola  mL/hr at 09/15/20 1125 200 mg at 09/15/20 1125    levETIRAcetam (KEPPRA) 2,000 mg in dextrose 5 % 250 mL IVPB  2,000 mg Intravenous Q12H Luigi Andrews  mL/hr at 09/15/20 0811 2,000 mg at 09/15/20 0811    lorazepam injection 2 mg  2 mg Intravenous Q1H PRN Gregory Ortega MD        magnesium oxide tablet 800 mg  800 mg Oral PRN Andres P. Andras, NP        magnesium oxide tablet 800 mg  800 mg Oral PRN Andres P. Andras, NP        magnesium sulfate 2g in water 50mL IVPB (premix)  2 g Intravenous PRN Gregory Ortega MD   2 g at 09/15/20 0312    magnesium sulfate 2g in water 50mL IVPB (premix)  4 g Intravenous PRN Gregory Ortega MD        [START ON 9/16/2020] multivitamin tablet 1 tablet  1 tablet Per NG tube Daily Luigi Andrews MD        ondansetron injection 4 mg  4 mg Intravenous Q6H PRN Andres P. Andras, NP        potassium chloride packet 40 mEq  40 mEq Oral PRN Andres P. Andras, NP        potassium chloride packet 40 mEq  40 mEq Oral PRN Andres P. Andras, NP        potassium chloride packet 60 mEq  60 mEq Oral PRN Andres P. Andras, NP   60 mEq at 09/15/20 0506    potassium, sodium phosphates 280-160-250 mg packet 2 packet  2 packet Oral PRN Andres P. Andras, NP        potassium, sodium phosphates 280-160-250 mg packet 2 packet  2 packet Oral PRN Andres Clements,  NP        potassium, sodium phosphates 280-160-250 mg packet 2 packet  2 packet Oral PRN Andres Clements NP        [START ON 9/16/2020] sertraline tablet 25 mg  25 mg Per NG tube Daily Luigi Andrews MD        sodium chloride 0.9% flush 10 mL  10 mL Intravenous PRN Ingrid Hughes MD        sodium chloride 0.9% flush 3 mL  3 mL Intravenous Q8H Andres Clements NP 0 mL/hr at 09/13/20 0924 3 mL at 09/15/20 1345    sodium phosphate 15 mmol in dextrose 5 % 250 mL IVPB  15 mmol Intravenous PRN Gregory Ortega MD        sodium phosphate 20.01 mmol in dextrose 5 % 250 mL IVPB  20.01 mmol Intravenous PRN Gregory Oretga MD        sodium phosphate 30 mmol in dextrose 5 % 250 mL IVPB  30 mmol Intravenous PRN Gregory Ortega MD        valproate (DEPACON) 750 mg in dextrose 5 % 100 mL IVPB  750 mg Intravenous Q8H Luigi Andrews  mL/hr at 09/15/20 1124 750 mg at 09/15/20 1124     Continuous Infusions:   sodium chloride 0.9% 75 mL/hr at 09/15/20 1405       Review of Systems   Constitutional: Negative for chills and fever.   Eyes: Negative for photophobia and redness.   Respiratory: Negative for cough and shortness of breath.    Cardiovascular: Negative for chest pain and leg swelling.   Gastrointestinal: Negative for nausea and vomiting.   Musculoskeletal: Positive for neck pain. Negative for neck stiffness.   Skin: Negative for pallor.   Neurological: Positive for seizures. Negative for dizziness, speech difficulty, weakness and headaches.   Psychiatric/Behavioral: Positive for hallucinations. Negative for agitation. The patient is not hyperactive.      Objective:     Vital Signs (Most Recent):  Temp: 98 °F (36.7 °C) (09/15/20 1105)  Pulse: 85 (09/15/20 1405)  Resp: (!) 21 (09/15/20 1405)  BP: (!) 148/65 (09/15/20 1405)  SpO2: 100 % (09/15/20 1405) Vital Signs (24h Range):  Temp:  [97.9 °F (36.6 °C)-98.6 °F (37 °C)] 98 °F (36.7 °C)  Pulse:  [] 85  Resp:  [15-35] 21  SpO2:  [95  %-100 %] 100 %  BP: (110-185)/(55-86) 148/65     Weight: 74.9 kg (165 lb 2 oz)  Body mass index is 30.2 kg/m².    Physical Exam  Vitals signs and nursing note reviewed.   Constitutional:       Appearance: Normal appearance. She is normal weight.   HENT:      Head: Normocephalic.   Eyes:      Extraocular Movements: Extraocular movements intact.      Conjunctiva/sclera: Conjunctivae normal.      Pupils: Pupils are equal, round, and reactive to light.   Neck:      Musculoskeletal: Neck supple. No neck rigidity.   Cardiovascular:      Pulses: Normal pulses.   Pulmonary:      Effort: Pulmonary effort is normal. No respiratory distress.   Musculoskeletal:         General: No deformity.   Skin:     General: Skin is warm and dry.   Neurological:      Mental Status: She is oriented to person, place, and time.         NEUROLOGICAL EXAMINATION:     MENTAL STATUS   Oriented to person, place, and time.   Level of consciousness: drowsy       Drowsy, arouses to verbal stimuli, follows commands and answers orientation questions     CRANIAL NERVES     CN III, IV, VI   Pupils are equal, round, and reactive to light.    CN VII   Left facial weakness: mild left lower facial weakness.    CN VIII   CN VIII normal.       Significant Labs: All pertinent lab results from the past 24 hours have been reviewed.    Significant Studies: I have reviewed all pertinent imaging results/findings within the past 24 hours.    Assessment and Plan:     * Seizure disorder  59 yo female with no known history of seizures transferred from [facility] for management of recurrent episodes concerning for seizure. Started on Vimpat, Keppra, Onfi at OSH. EEG initiated 9/13>9/14 showing recurrent electroclinical seizures with clinical association of prominent left head/eye version.    Recommendations:   - Continue vEEG - continues to be very active, however overall seizure frequency appears to be improving  - Continue  mg TID, Onfi increased to 20 mg  "daily, Vimpat 200 mg BID, Keppra 2000 mg BID  - LP completed 9/13, Protein 167, further CSF results pending. On empiric Acyclovir  - Management of acute infectious/metabolic abnormalities per [facility]    Plan of care discussed with [facility] team, patient at bedside. Will continue to vollow.    Hallucinations  - Patient reports intermittent hallucinations prior to current hospitalization, "seeing a person in the next room"  - Psychiatry following  - Follow up CSF results, infectious workup    Abnormal CT of brain  - MRI brain 9/12/20 showed restricted diffusion and edema throughout the right frontal lobe with sulcal effacement.  Findings concerning for acute infarction in the right CESILIA territory    Cervical disc disease  - Reports chronic neck pain         VTE Risk Mitigation (From admission, onward)         Ordered     heparin (porcine) injection 5,000 Units  Every 8 hours      09/12/20 1930     IP VTE LOW RISK PATIENT  Once      09/11/20 1915     Place sequential compression device  Until discontinued      09/11/20 7132                Lucille Ramos PA-C  Neurology-Epilepsy  [facility]  Staff: Dr. Funes  "

## 2020-09-15 NOTE — PT/OT/SLP EVAL
Speech Language Pathology Evaluation  Cognitive Communication    Patient Name:  Donna Arias   MRN:  [id number]  Admitting Diagnosis: Seizure disorder    Recommendations:     Recommendations:                General Recommendations:  Cognitive-linguistic therapy  Diet recommendations:  Regular, Thin   Aspiration Precautions: Standard aspiration precautions   General Precautions: Standard, fall, seizure  Communication strategies:  provide increased time to answer    History:     Past Medical History:   Diagnosis Date    Anxiety     Depression     Hypertension        History reviewed. No pertinent surgical history.      Prior diet: regular/thin.      Subjective     Somnolent requiring max cues to remain alert    Pain/Comfort:  · Pain Rating 1: 0/10  · Pain Rating Post-Intervention 1: 0/10    Objective:   Cognitive Status:    Orientation Oriented x4  Problem Solving Sequencing 2/4 word set likely 2/2 lethargy and Compare/contrast 100%      Receptive Language:   Comprehension:   Questions Complex yes/no 100%    Expressive Language:  Verbal:    Verbal language skills were wfl with no evidence of aphasia.  Pt. Expressed their thoughts coherently in conversation with no evidence of confusion or word finding deficits  Nonverbal:   WFL      Motor Speech:  WFL    Voice:   WFL    Visual-Spatial:  TBA    Reading:   TBA     Written Expression:   TBA    Treatment: Pt NPO 2/2 active seizures, PO trials deferred this date.     Assessment:   Donna Arias is a 60 y.o. female with an SLP diagnosis of Cognitive-Linguistic Impairment.      Goals:   Multidisciplinary Problems     SLP Goals        Problem: SLP Goal    Goal Priority Disciplines Outcome   SLP Goal     SLP Ongoing, Progressing   Description: Speech Language Pathology Goals  Goals expected to be met by 9/21    1. Pt will tolerate regular solids and thin liquid diet with no overt clinical signs of airway compromise.  2. Pt will participate in ongoing assessment of cognitive  linguistic aspects.                            Plan:   · Patient to be seen:  4 x/week   · Plan of Care expires:  10/13/20  · Plan of Care reviewed with:  patient, family   · SLP Follow-Up:  Yes       Discharge recommendations:  Discharge Facility/Level of Care Needs: rehabilitation facility       Time Tracking:   SLP Treatment Date:   09/15/20  Speech Start Time:  0756  Speech Stop Time:  0804     Speech Total Time (min):  8 min    Billable Minutes: Eval 8     Desirae Sanders CCC-SLP  09/15/2020

## 2020-09-15 NOTE — PROGRESS NOTES
[facility]  Neurocritical Care  Progress Note    Admit Date: 9/11/2020  Service Date: 09/15/2020  Length of Stay: 4    Subjective:     Chief Complaint: Seizure disorder    History of Present Illness: Ms Arias is a 61 yo female with sig pmhx of depression with noncompliance of meds for a month, Anxiety, and HTN who presents to [facility] for a higher level of care for right CESILIA and R/O seizure. Patient also PEC on 9/11/20 at OSH for Hallucinations. Per OSH note pt had continued seizure activity occurring every 5 min and was treated with Keppra and Vimpat. Neurology attempted lumbar puncture x 2, but was unsuccessful. CT scan and MRI brain revealed increased signal in the right frontal lobe that Neurology thought might be consistent with seizure activity. Patient is currently AAO x 3 and has no neurological deficits on exam.         Hospital Course: 9/12/2020 Admit to [facility], EEG  09/13/2020: No epilepsy on EEG. Successful LP once Pt arrived on the floor. Pt with waxing and waning neuro exam. Upon arrival Pt able to answer questions and able to follow some commands.   9/14/2020: YOVANI, on EEG-   9/15/2020: 2 clinical seizures per nurse early this morning, pending epilepsy reccs, continue EEG monitoring        Review of Systems   Unable to obtain a complete ROS due to level of consciousness- more drowsy  Objective:     Vitals:  Temp: 97.9 °F (36.6 °C)  Pulse: 104  Rhythm: normal sinus rhythm  BP: 138/74  MAP (mmHg): 99  Resp: 20  SpO2: 99 %  Oxygen Concentration (%): 24  O2 Device (Oxygen Therapy): room air    Temp  Min: 97.8 °F (36.6 °C)  Max: 99.7 °F (37.6 °C)  Pulse  Min: 71  Max: 116  BP  Min: 116/59  Max: 185/86  MAP (mmHg)  Min: 82  Max: 124  Resp  Min: 16  Max: 39  SpO2  Min: 95 %  Max: 100 %  Oxygen Concentration (%)  Min: 24  Max: 24    09/14 0701 - 09/15 0700  In: 3574.3 [I.V.:1942.3]  Out: 3550 [Urine:3550]   Unmeasured Output  Urine Occurrence: 1  Stool Occurrence: 0  Emesis Occurrence: 0  Pad  Count: 1       Physical Exam  GA: comfortable, no acute distress.   HEENT: No scleral icterus or JVD.   Pulmonary: Clear to auscultation A/L.   Cardiac: RRR S1 & S2 w/o rubs/murmurs/gallops.   Abdominal: Bowel sounds present x 4. No appreciable hepatosplenomegaly.  Skin: No jaundice, rashes, or visible lesions.  Neuro:  --GCS: E3 V5 M6  --Mental Status:  More drowsy this morning oriented X3, follows simple commands  --CN II-XII grossly intact.   --Pupils 3mm, PERRL.   --Corneal reflex, gag, cough intact.  --MCDANIEL spont    Medications:  Continuoussodium chloride 0.9%, Last Rate: 75 mL/hr at 09/15/20 0805    Scheduledacyclovir, 10 mg/kg (Ideal), Q8H  aspirin, 81 mg, Daily  atorvastatin, 40 mg, Daily  cloBAZam, 20 mg, BID  docusate sodium, 100 mg, BID  heparin (porcine), 5,000 Units, Q8H  lacosamide (VIMPAT) IVPB, 200 mg, Q12H  levetiracetam IVPB, 2,000 mg, Q12H  multivitamin, 1 tablet, Daily  sertraline, 25 mg, Daily  sodium chloride 0.9%, 3 mL, Q8H  valproate sodium (DEPACON) IVPB, 750 mg, Q8H    PRNacetaminophen, 650 mg, Q6H PRN  acetaminophen, 650 mg, Q4H PRN  calcium gluconate IVPB, 1 g, PRN  labetalol, 10 mg, Q4H PRN  lorazepam, 2 mg, Q1H PRN  magnesium oxide, 800 mg, PRN  magnesium oxide, 800 mg, PRN  magnesium sulfate IVPB, 2 g, PRN  magnesium sulfate IVPB, 4 g, PRN  ondansetron, 4 mg, Q6H PRN  potassium chloride, 40 mEq, PRN  potassium chloride, 40 mEq, PRN  potassium chloride, 60 mEq, PRN  potassium, sodium phosphates, 2 packet, PRN  potassium, sodium phosphates, 2 packet, PRN  potassium, sodium phosphates, 2 packet, PRN  sodium chloride 0.9%, 10 mL, PRN  sodium phosphate IVPB, 15 mmol, PRN  sodium phosphate IVPB, 20.01 mmol, PRN  sodium phosphate IVPB, 30 mmol, PRN      Today I personally reviewed pertinent medications, lines/drains/airways, imaging, cardiology results, laboratory results,    Diet  Diet NPO Except for: Medication        Assessment/Plan:     Neuro  * Seizure disorder  -- Continue Neuro checks q  1hr  -- Epilepsy Service consulted- recommend 2 mg of ativan as needed if Pt has catatonic like seizure.   -- Seizure Precautions  -- Continue vEEG  -- Continue cloBAZam Tab 10 mg, Vimpat 200 mg bid, and Keppra 1000 mg bid  -- concern for encephalitis due to infectious etiology- LP performed 9/13/20. Follow up infectious work-up. Consider acyclovir and antimicrobials if Neuro status does not improve or clinically Pt declines.   9/15/2020: continue AEDs (vimpat , keppra, clobazam, valproate)-appreciate epilepsy reccs      Stroke  -- Continue Neuro checks q 1hr  -- Vascular Neurology consulted-  -- MRI (9/12/20) - changes involving the right frontal lobe increased signal, cortical banding which can be seen with seizure activity. Given presentation stroke less likely.   -- SBP goal <220  -- PT/OT/Speech  -- ASA 81 mg daily, Atorvastatin 40 mg daily, SQH 5000 units q8h  -- SQ heparin.     Renal/  Hypokalemia  -- electrolyte replacement ordered  -k2.8 this morning- replaced- pending repeat K          The patient is being Prophylaxed for:  Venous Thromboembolism with: Chemical  Stress Ulcer with: None  Ventilator Pneumonia with: not applicable    Activity Orders          Diet NPO Except for: Medication: NPO starting at 09/14 1300        Full Code    Sherry Carlson NP  Neurocritical Care  [facility]

## 2020-09-15 NOTE — PLAN OF CARE
Recommendations     1. If/when able to advance diet, rec'd Regular diet (texture per SLP).  2. If unable to advance diet, initiate enteral nutrition. Rec'd Isosource 1.5 @ 45 mL/hr to provide 1620 kcals, 73 g of protein, 825 mL fluid.  3. RD to monitor & follow-up.     Goals: Meet % EEN, EPN by RD f/u date  Nutrition Goal Status: new  Communication of RD Recs: reviewed with RN

## 2020-09-15 NOTE — ASSESSMENT & PLAN NOTE
59 yo female with no known history of seizures transferred from [facility] for management of recurrent episodes concerning for seizure. Started on Vimpat, Keppra, Onfi at OSH. EEG initiated 9/13>9/14 showing recurrent electroclinical seizures with clinical association of prominent left head/eye version.    Recommendations:   - Continue vEEG - continues to be very active, however overall seizure frequency appears to be improving  - Continue  mg TID, Onfi increased to 20 mg daily, Vimpat 200 mg BID, Keppra 2000 mg BID  - LP completed 9/13, Protein 167, further CSF results pending. On empiric Acyclovir  - Management of acute infectious/metabolic abnormalities per [facility]    Plan of care discussed with [facility] team, patient at bedside. [facility]

## 2020-09-15 NOTE — CONSULTS
Wound consult received on patient's right heel. Appears to be a resolved/healed blister to right lateral heel. Pink healed tissue noted, callus tissue noted.   Recommend protecting with heel foam and elevate with heel boots.  Nursing to continue care. Re-consult wound care if needed.          Right lateral heel

## 2020-09-15 NOTE — SUBJECTIVE & OBJECTIVE
Interval History: Drowsy this morning, however arousable to verbal stimuli and following commands. Seizure frequency overall improving. Continue current AED regimen, close EEG monitoring.    Current Facility-Administered Medications   Medication Dose Route Frequency Provider Last Rate Last Dose    0.9%  NaCl infusion   Intravenous Continuous Andres ROSAMARIA Clements NP 75 mL/hr at 09/15/20 1405      acetaminophen suppository 650 mg  650 mg Rectal Q6H PRN Andres PJuan Carlos Clements NP        acetaminophen tablet 650 mg  650 mg Oral Q4H PRN Casandrajemma Noyola NP   650 mg at 09/14/20 1110    acyclovir (ZOVIRAX) 500 mg in dextrose 5 % 100 mL IVPB  10 mg/kg (Ideal) Intravenous Q8H Irwin Akers  mL/hr at 09/15/20 1344 500 mg at 09/15/20 1344    aspirin chewable tablet 81 mg  81 mg Per NG tube Daily Luigi Andrews MD   Stopped at 09/15/20 1045    [START ON 9/16/2020] atorvastatin tablet 40 mg  40 mg Per NG tube Daily Luigi Andrews MD        calcium gluconate 1g in dextrose 5% 100mL (ready to mix system)  1 g Intravenous PRN Gregory Ortega MD        cloBAZam Tab 20 mg  20 mg Per NG tube BID Luigi Andrews MD        docusate 50 mg/5 mL liquid 100 mg  100 mg Per NG tube Daily Luigi Andrews MD        heparin (porcine) injection 5,000 Units  5,000 Units Subcutaneous Q8H Andres P. AMADEO Clements   5,000 Units at 09/15/20 1346    labetalol 20 mg/4 mL (5 mg/mL) IV syring  10 mg Intravenous Q4H PRN Andressharon lCements NP        lacosamide (VIMPAT) 200 mg in sodium chloride 0.9% 100 mL IVPB  200 mg Intravenous Q12H Casandra Noyola  mL/hr at 09/15/20 1125 200 mg at 09/15/20 1125    levETIRAcetam (KEPPRA) 2,000 mg in dextrose 5 % 250 mL IVPB  2,000 mg Intravenous Q12H Luigi Andrews  mL/hr at 09/15/20 0811 2,000 mg at 09/15/20 0811    lorazepam injection 2 mg  2 mg Intravenous Q1H PRN Gregory Ortega MD        magnesium oxide tablet 800 mg  800 mg Oral PRN Andres Clements, NP         magnesium oxide tablet 800 mg  800 mg Oral PRN Andres P. Andras, NP        magnesium sulfate 2g in water 50mL IVPB (premix)  2 g Intravenous PRN Gregory Ortega MD   2 g at 09/15/20 0312    magnesium sulfate 2g in water 50mL IVPB (premix)  4 g Intravenous PRN Gregory Ortega MD        [START ON 9/16/2020] multivitamin tablet 1 tablet  1 tablet Per NG tube Daily Luigi Andrews MD        ondansetron injection 4 mg  4 mg Intravenous Q6H PRN Andres P. Andras, NP        potassium chloride packet 40 mEq  40 mEq Oral PRN Andres P. Andras, NP        potassium chloride packet 40 mEq  40 mEq Oral PRN Andres P. Andras, NP        potassium chloride packet 60 mEq  60 mEq Oral PRN Andres P. Andras, NP   60 mEq at 09/15/20 0506    potassium, sodium phosphates 280-160-250 mg packet 2 packet  2 packet Oral PRN Andres P. Andras, NP        potassium, sodium phosphates 280-160-250 mg packet 2 packet  2 packet Oral PRN Andres P. Andras, NP        potassium, sodium phosphates 280-160-250 mg packet 2 packet  2 packet Oral PRN Andres P. Andras, NP        [START ON 9/16/2020] sertraline tablet 25 mg  25 mg Per NG tube Daily Luigi Andrews MD        sodium chloride 0.9% flush 10 mL  10 mL Intravenous PRN Ingrid Hughes MD        sodium chloride 0.9% flush 3 mL  3 mL Intravenous Q8H Andres P. Andras, NP 0 mL/hr at 09/13/20 0924 3 mL at 09/15/20 1345    sodium phosphate 15 mmol in dextrose 5 % 250 mL IVPB  15 mmol Intravenous PRN Gregory Ortega MD        sodium phosphate 20.01 mmol in dextrose 5 % 250 mL IVPB  20.01 mmol Intravenous PRN Gregory Ortega MD        sodium phosphate 30 mmol in dextrose 5 % 250 mL IVPB  30 mmol Intravenous PRN Gregory Ortega MD        valproate (DEPACON) 750 mg in dextrose 5 % 100 mL IVPB  750 mg Intravenous Q8H Luigi Andrews  mL/hr at 09/15/20 1124 750 mg at 09/15/20 1124     Continuous Infusions:   sodium chloride 0.9% 75 mL/hr at 09/15/20  1405       Review of Systems   Constitutional: Negative for chills and fever.   Eyes: Negative for photophobia and redness.   Respiratory: Negative for cough and shortness of breath.    Cardiovascular: Negative for chest pain and leg swelling.   Gastrointestinal: Negative for nausea and vomiting.   Musculoskeletal: Positive for neck pain. Negative for neck stiffness.   Skin: Negative for pallor.   Neurological: Positive for seizures. Negative for dizziness, speech difficulty, weakness and headaches.   Psychiatric/Behavioral: Positive for hallucinations. Negative for agitation. The patient is not hyperactive.      Objective:     Vital Signs (Most Recent):  Temp: 98 °F (36.7 °C) (09/15/20 1105)  Pulse: 85 (09/15/20 1405)  Resp: (!) 21 (09/15/20 1405)  BP: (!) 148/65 (09/15/20 1405)  SpO2: 100 % (09/15/20 1405) Vital Signs (24h Range):  Temp:  [97.9 °F (36.6 °C)-98.6 °F (37 °C)] 98 °F (36.7 °C)  Pulse:  [] 85  Resp:  [15-35] 21  SpO2:  [95 %-100 %] 100 %  BP: (110-185)/(55-86) 148/65     Weight: 74.9 kg (165 lb 2 oz)  Body mass index is 30.2 kg/m².    Physical Exam  Vitals signs and nursing note reviewed.   Constitutional:       Appearance: Normal appearance. She is normal weight.   HENT:      Head: Normocephalic.   Eyes:      Extraocular Movements: Extraocular movements intact.      Conjunctiva/sclera: Conjunctivae normal.      Pupils: Pupils are equal, round, and reactive to light.   Neck:      Musculoskeletal: Neck supple. No neck rigidity.   Cardiovascular:      Pulses: Normal pulses.   Pulmonary:      Effort: Pulmonary effort is normal. No respiratory distress.   Musculoskeletal:         General: No deformity.   Skin:     General: Skin is warm and dry.   Neurological:      Mental Status: She is oriented to person, place, and time.         NEUROLOGICAL EXAMINATION:     MENTAL STATUS   Oriented to person, place, and time.   Level of consciousness: drowsy       Drowsy, arouses to verbal stimuli, follows  commands and answers orientation questions     CRANIAL NERVES     CN III, IV, VI   Pupils are equal, round, and reactive to light.    CN VII   Left facial weakness: mild left lower facial weakness.    CN VIII   CN VIII normal.       Significant Labs: All pertinent lab results from the past 24 hours have been reviewed.    Significant Studies: I have reviewed all pertinent imaging results/findings within the past 24 hours.
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
## 2020-09-15 NOTE — PLAN OF CARE
SW received message from Rita with [facility] ([phone] or [phone]) reporting they do not have beds currently, but Pt does not look ready. Therefore they would like to follow and still receive updates.     RICKEY received message and the spoke with Sofy with [facility] ([phone]) reporting they will be following this Pt.     RICKEY received message from Mee Caban with [facility] ([phone]) reporting they will be following this Pt.     Sent therapy notes and updates via .      NANETTE EdmondsonW  Neurocritical Care   [facility]  [id number]

## 2020-09-15 NOTE — ASSESSMENT & PLAN NOTE
"- Patient reports intermittent hallucinations prior to current hospitalization, "seeing a person in the next room"  - Psychiatry following  - Follow up CSF results, infectious workup  "

## 2020-09-15 NOTE — PLAN OF CARE
[facility] Care Plan    POC reviewed with Donna Arias and family at 0300. Pt verbalized understanding. Pt still needed reassurance.  Questions and concerns addressed. Placed NGT d/t too drowsy to swallow pills. Normal saline gtt at 75 mL/hr. Replaced potassium and magnesium. Didn't see the neck turning as seizure sign. Vs and neuro status stabled. Gave bath and changed linens. Pt progressing toward goals. Will continue to monitor. See below and flowsheets for full assessment and VS info.       Neuro:  Eben Coma Scale  Best Eye Response: 4-->(E4) spontaneous  Best Motor Response: 6-->(M6) obeys commands  Best Verbal Response: 4-->(V4) confused  Monroe Coma Scale Score: 14  Assessment Qualifiers: patient chemically sedated or paralyzed  Pupil PERRLA: yes     24hr Temp:  [97.8 °F (36.6 °C)-99.7 °F (37.6 °C)]     CV:   Rhythm: sinus tachycardia, normal sinus rhythm  BP goals:   SBP < 220  MAP > 65    Resp:   O2 Device (Oxygen Therapy): room air  Oxygen Concentration (%): 24    Plan: N/A    GI/:  AVERY Total Score: 20  Diet/Nutrition Received: NPO  Last Bowel Movement: 09/14/20  Voiding Characteristics: external catheter    Intake/Output Summary (Last 24 hours) at 9/15/2020 0528  Last data filed at 9/15/2020 0515  Gross per 24 hour   Intake 3466.25 ml   Output 3550 ml   Net -83.75 ml     Unmeasured Output  Urine Occurrence: 1  Stool Occurrence: 1  Emesis Occurrence: 0  Pad Count: 2    Labs/Accuchecks:  Recent Labs   Lab 09/15/20  0101   WBC 10.08   RBC 3.43*   HGB 10.1*   HCT 31.7*         Recent Labs   Lab 09/15/20  0101   *   K 2.8*   CO2 24   *   BUN 5*   CREATININE 0.6   ALKPHOS 67   ALT 15   AST 15   BILITOT 0.3      Recent Labs   Lab 09/12/20  1151  09/15/20  0101   INR 1.0   < > 1.0   APTT 26.2  --   --     < > = values in this interval not displayed.      Recent Labs   Lab 09/13/20  0107   CPK 75   CPKMB 1.2   TROPONINI <0.006   MB 1.6       Electrolytes: Electrolytes replaced  Accuchecks:  Q6H    Gtts:   sodium chloride 0.9% 75 mL/hr at 09/15/20 0505       LDA/Wounds:  Lines/Drains/Airways       Drain              Female External Urinary Catheter 09/13/20 1700 1 day         NG/OG Tube 09/14/20 2159 nasogastric 14 Fr. Right nostril less than 1 day              Peripheral Intravenous Line                   Peripheral IV - Single Lumen 09/13/20 0656 24 G Left Hand 1 day         Peripheral IV - Single Lumen 09/14/20 1211 20 G Anterior;Right Forearm less than 1 day         Peripheral IV - Single Lumen 09/14/20 1334 18 G Right Antecubital less than 1 day                  Wounds: Yes  Wound care consulted: Yes

## 2020-09-15 NOTE — ASSESSMENT & PLAN NOTE
-- Continue Neuro checks q 1hr  -- Epilepsy Service consulted- recommend 2 mg of ativan as needed if Pt has catatonic like seizure.   -- Seizure Precautions  -- Continue vEEG  -- Continue cloBAZam Tab 10 mg, Vimpat 200 mg bid, and Keppra 1000 mg bid  -- concern for encephalitis due to infectious etiology- LP performed 9/13/20. Follow up infectious work-up. Consider acyclovir and antimicrobials if Neuro status does not improve or clinically Pt declines.   9/15/2020: continue AEDs (vimpat , keppra, clobazam, valproate)-appreciate epilepsy reccs

## 2020-09-15 NOTE — SUBJECTIVE & OBJECTIVE
Review of Systems   Unable to obtain a complete ROS due to level of consciousness- more drowsy  Objective:     Vitals:  Temp: 97.9 °F (36.6 °C)  Pulse: 104  Rhythm: normal sinus rhythm  BP: 138/74  MAP (mmHg): 99  Resp: 20  SpO2: 99 %  Oxygen Concentration (%): 24  O2 Device (Oxygen Therapy): room air    Temp  Min: 97.8 °F (36.6 °C)  Max: 99.7 °F (37.6 °C)  Pulse  Min: 71  Max: 116  BP  Min: 116/59  Max: 185/86  MAP (mmHg)  Min: 82  Max: 124  Resp  Min: 16  Max: 39  SpO2  Min: 95 %  Max: 100 %  Oxygen Concentration (%)  Min: 24  Max: 24    09/14 0701 - 09/15 0700  In: 3574.3 [I.V.:1942.3]  Out: 3550 [Urine:3550]   Unmeasured Output  Urine Occurrence: 1  Stool Occurrence: 0  Emesis Occurrence: 0  Pad Count: 1       Physical Exam  GA: comfortable, no acute distress.   HEENT: No scleral icterus or JVD.   Pulmonary: Clear to auscultation A/L.   Cardiac: RRR S1 & S2 w/o rubs/murmurs/gallops.   Abdominal: Bowel sounds present x 4. No appreciable hepatosplenomegaly.  Skin: No jaundice, rashes, or visible lesions.  Neuro:  --GCS: E3 V5 M6  --Mental Status:  More drowsy this morning oriented X3, follows simple commands  --CN II-XII grossly intact.   --Pupils 3mm, PERRL.   --Corneal reflex, gag, cough intact.  --MCDANIEL spont    Medications:  Continuoussodium chloride 0.9%, Last Rate: 75 mL/hr at 09/15/20 0805    Scheduledacyclovir, 10 mg/kg (Ideal), Q8H  aspirin, 81 mg, Daily  atorvastatin, 40 mg, Daily  cloBAZam, 20 mg, BID  docusate sodium, 100 mg, BID  heparin (porcine), 5,000 Units, Q8H  lacosamide (VIMPAT) IVPB, 200 mg, Q12H  levetiracetam IVPB, 2,000 mg, Q12H  multivitamin, 1 tablet, Daily  sertraline, 25 mg, Daily  sodium chloride 0.9%, 3 mL, Q8H  valproate sodium (DEPACON) IVPB, 750 mg, Q8H    PRNacetaminophen, 650 mg, Q6H PRN  acetaminophen, 650 mg, Q4H PRN  calcium gluconate IVPB, 1 g, PRN  labetalol, 10 mg, Q4H PRN  lorazepam, 2 mg, Q1H PRN  magnesium oxide, 800 mg, PRN  magnesium oxide, 800 mg, PRN  magnesium  sulfate IVPB, 2 g, PRN  magnesium sulfate IVPB, 4 g, PRN  ondansetron, 4 mg, Q6H PRN  potassium chloride, 40 mEq, PRN  potassium chloride, 40 mEq, PRN  potassium chloride, 60 mEq, PRN  potassium, sodium phosphates, 2 packet, PRN  potassium, sodium phosphates, 2 packet, PRN  potassium, sodium phosphates, 2 packet, PRN  sodium chloride 0.9%, 10 mL, PRN  sodium phosphate IVPB, 15 mmol, PRN  sodium phosphate IVPB, 20.01 mmol, PRN  sodium phosphate IVPB, 30 mmol, PRN      Today I personally reviewed pertinent medications, lines/drains/airways, imaging, cardiology results, laboratory results,    Diet  Diet NPO Except for: Medication

## 2020-09-15 NOTE — PROCEDURES
EEG prelim    17:30 p.m. - 20:20 p.m.    2 mg Ativan at 18:22 p.m.. 400 mg lacosamide at 18:24 p.m. Last electroclinical seizure ended at 18:24 p.m. Respiratory status appears stable.  Will monitor overnight and make further AED adjustments in the morning depending on how the night goes.    Please hit the button with any left head or eye version.    Elizabeth Funes MD PhD  Neurology-Epilepsy  [facility].  [facility]

## 2020-09-15 NOTE — CONSULTS
"  [facility]  Adult Nutrition  Consult Note    SUMMARY     Recommendations    1. If/when able to advance diet, rec'd Regular diet (texture per SLP).  2. If unable to advance diet, initiate enteral nutrition. Rec'd Isosource 1.5 @ 45 mL/hr to provide 1620 kcals, 73 g of protein, 825 mL fluid.  3. RD to monitor & follow-up.    Goals: Meet % EEN, EPN by RD f/u date  Nutrition Goal Status: new  Communication of RD Recs: reviewed with RN    Reason for Assessment    Reason For Assessment: consult  Diagnosis: other (see comments)(Seizure disorder)  Relevant Medical History: HTN, Depression  Interdisciplinary Rounds: did not attend    General Information Comments: ST recommends Regular diet w/ thin liquids. Pt remains NPO, NGT placed 2/2 lethargy. Pt resting at time of visit, no family at bedside. Unsure of PO intake PTA, stable wt per chart review. NFPE not complete 2/2 pt resting, however visually pt appears nourished w/ no physical signs of malnutrition. Will continue to monitor energy intake & weight changes.  Nutrition Discharge Planning: Unable to determine    Nutrition/Diet History    Spiritual, Cultural Beliefs, Shinto Practices, Values that Affect Care: no  Factors Affecting Nutritional Intake: NPO    Anthropometrics    Temp: 98 °F (36.7 °C)  Height Method: Stated  Height: 5' 2" (157.5 cm)  Height (inches): 62 in  Weight Method: Bed Scale  Weight: 74.9 kg (165 lb 2 oz)  Weight (lb): 165.13 lb  Ideal Body Weight (IBW), Female: 110 lb  % Ideal Body Weight, Female (lb): 150.12 %  BMI (Calculated): 30.2  BMI Grade: 30 - 34.9- obesity - grade I    Lab/Procedures/Meds    Pertinent Labs Reviewed: reviewed  Pertinent Labs Comments: Na 146  Pertinent Medications Reviewed: reviewed  Pertinent Medications Comments: IVF    Estimated/Assessed Needs    Weight Used For Calorie Calculations: 75 kg (165 lb 5.5 oz)     Energy Calorie Requirements (kcal): 1591 kcal/d  Energy Need Method: [place](1.25 PAL)     Protein Requirements: 75-90 g/d (1-1.2 g/kg)  Weight Used For Protein Calculations: 75 kg (165 lb 5.5 oz)     Estimated Fluid Requirement Method: other (see comments)(Per MD or 1 mL/kcal)  RDA Method (mL): 1591    Nutrition Prescription Ordered    Current Diet Order: NPO    Evaluation of Received Nutrient/Fluid Intake    Comments: LBM: 9/14    Nutrition Risk    Level of Risk/Frequency of Follow-up: (2x/week)     Assessment and Plan    Nutrition Problem  Inadequate energy intake    Related to (etiology):   Inability to consume sufficient energy    Signs and Symptoms (as evidenced by):   NPO    Interventions(treatment strategy):  Collaboration of nutrition care w/ other providers  Enteral nutrition     Nutrition Diagnosis Status:   New     Monitor and Evaluation    Food and Nutrient Intake: energy intake, food and beverage intake, enteral nutrition intake  Food and Nutrient Adminstration: diet order, enteral and parenteral nutrition administration  Physical Activity and Function: nutrition-related ADLs and IADLs  Anthropometric Measurements: weight, weight change  Biochemical Data, Medical Tests and Procedures: inflammatory profile, lipid profile, gastrointestinal profile, glucose/endocrine profile, electrolyte and renal panel  Nutrition-Focused Physical Findings: overall appearance     Nutrition Follow-Up    RD Follow-up?: Yes

## 2020-09-15 NOTE — ASSESSMENT & PLAN NOTE
-- Continue Neuro checks q 1hr  -- Vascular Neurology consulted-  -- MRI (9/12/20) - changes involving the right frontal lobe increased signal, cortical banding which can be seen with seizure activity. Given presentation stroke less likely.   -- SBP goal <220  -- PT/OT/Speech  -- ASA 81 mg daily, Atorvastatin 40 mg daily, SQH 5000 units q8h  -- SQ heparin.

## 2020-09-16 LAB
ALBUMIN SERPL BCP-MCNC: 2.5 G/DL (ref 3.5–5.2)
ALP SERPL-CCNC: 64 U/L (ref 55–135)
ALT SERPL W/O P-5'-P-CCNC: 14 U/L (ref 10–44)
ANION GAP SERPL CALC-SCNC: 8 MMOL/L (ref 8–16)
AST SERPL-CCNC: 13 U/L (ref 10–40)
BASOPHILS # BLD AUTO: 0.03 K/UL (ref 0–0.2)
BASOPHILS NFR BLD: 0.3 % (ref 0–1.9)
BILIRUB SERPL-MCNC: 0.3 MG/DL (ref 0.1–1)
BUN SERPL-MCNC: 5 MG/DL (ref 6–20)
CALCIUM SERPL-MCNC: 7.3 MG/DL (ref 8.7–10.5)
CHLORIDE SERPL-SCNC: 110 MMOL/L (ref 95–110)
CO2 SERPL-SCNC: 24 MMOL/L (ref 23–29)
CREAT SERPL-MCNC: 0.6 MG/DL (ref 0.5–1.4)
DIFFERENTIAL METHOD: ABNORMAL
EOSINOPHIL # BLD AUTO: 0.3 K/UL (ref 0–0.5)
EOSINOPHIL NFR BLD: 3.5 % (ref 0–8)
ERYTHROCYTE [DISTWIDTH] IN BLOOD BY AUTOMATED COUNT: 14.6 % (ref 11.5–14.5)
EST. GFR  (AFRICAN AMERICAN): >60 ML/MIN/1.73 M^2
EST. GFR  (NON AFRICAN AMERICAN): >60 ML/MIN/1.73 M^2
GLUCOSE SERPL-MCNC: 84 MG/DL (ref 70–110)
HCT VFR BLD AUTO: 31.8 % (ref 37–48.5)
HGB BLD-MCNC: 9.9 G/DL (ref 12–16)
IMM GRANULOCYTES # BLD AUTO: 0.02 K/UL (ref 0–0.04)
IMM GRANULOCYTES NFR BLD AUTO: 0.2 % (ref 0–0.5)
INR PPP: 1 (ref 0.8–1.2)
LYMPHOCYTES # BLD AUTO: 2 K/UL (ref 1–4.8)
LYMPHOCYTES NFR BLD: 23 % (ref 18–48)
MAGNESIUM SERPL-MCNC: 1.9 MG/DL (ref 1.6–2.6)
MCH RBC QN AUTO: 28.9 PG (ref 27–31)
MCHC RBC AUTO-ENTMCNC: 31.1 G/DL (ref 32–36)
MCV RBC AUTO: 93 FL (ref 82–98)
MONOCYTES # BLD AUTO: 0.4 K/UL (ref 0.3–1)
MONOCYTES NFR BLD: 4.5 % (ref 4–15)
NEUTROPHILS # BLD AUTO: 5.9 K/UL (ref 1.8–7.7)
NEUTROPHILS NFR BLD: 68.5 % (ref 38–73)
NRBC BLD-RTO: 0 /100 WBC
PHOSPHATE SERPL-MCNC: 4.1 MG/DL (ref 2.7–4.5)
PLATELET # BLD AUTO: 297 K/UL (ref 150–350)
PMV BLD AUTO: 9.1 FL (ref 9.2–12.9)
POCT GLUCOSE: 106 MG/DL (ref 70–110)
POCT GLUCOSE: 84 MG/DL (ref 70–110)
POCT GLUCOSE: 91 MG/DL (ref 70–110)
POCT GLUCOSE: 98 MG/DL (ref 70–110)
POTASSIUM SERPL-SCNC: 3.2 MMOL/L (ref 3.5–5.1)
POTASSIUM SERPL-SCNC: 3.9 MMOL/L (ref 3.5–5.1)
PROT SERPL-MCNC: 4.9 G/DL (ref 6–8.4)
PROTHROMBIN TIME: 11.4 SEC (ref 9–12.5)
RBC # BLD AUTO: 3.43 M/UL (ref 4–5.4)
SODIUM SERPL-SCNC: 142 MMOL/L (ref 136–145)
VALPROATE SERPL-MCNC: 121.2 UG/ML (ref 50–100)
WBC # BLD AUTO: 8.62 K/UL (ref 3.9–12.7)

## 2020-09-16 PROCEDURE — 80164 ASSAY DIPROPYLACETIC ACD TOT: CPT

## 2020-09-16 PROCEDURE — 63600175 PHARM REV CODE 636 W HCPCS: Performed by: PSYCHIATRY & NEUROLOGY

## 2020-09-16 PROCEDURE — 25000003 PHARM REV CODE 250: Performed by: ANESTHESIOLOGY

## 2020-09-16 PROCEDURE — 92507 TX SP LANG VOICE COMM INDIV: CPT

## 2020-09-16 PROCEDURE — 95720 EEG PHY/QHP EA INCR W/VEEG: CPT | Mod: ,,, | Performed by: PSYCHIATRY & NEUROLOGY

## 2020-09-16 PROCEDURE — 84132 ASSAY OF SERUM POTASSIUM: CPT

## 2020-09-16 PROCEDURE — 97112 NEUROMUSCULAR REEDUCATION: CPT

## 2020-09-16 PROCEDURE — 99291 CRITICAL CARE FIRST HOUR: CPT | Mod: ,,, | Performed by: NURSE PRACTITIONER

## 2020-09-16 PROCEDURE — C9254 INJECTION, LACOSAMIDE: HCPCS | Performed by: NURSE PRACTITIONER

## 2020-09-16 PROCEDURE — 80053 COMPREHEN METABOLIC PANEL: CPT

## 2020-09-16 PROCEDURE — 63600175 PHARM REV CODE 636 W HCPCS: Performed by: NURSE PRACTITIONER

## 2020-09-16 PROCEDURE — 25000003 PHARM REV CODE 250: Performed by: PSYCHIATRY & NEUROLOGY

## 2020-09-16 PROCEDURE — 84100 ASSAY OF PHOSPHORUS: CPT

## 2020-09-16 PROCEDURE — 99291 PR CRITICAL CARE, E/M 30-74 MINUTES: ICD-10-PCS | Mod: ,,, | Performed by: NURSE PRACTITIONER

## 2020-09-16 PROCEDURE — 95714 VEEG EA 12-26 HR UNMNTR: CPT

## 2020-09-16 PROCEDURE — 97530 THERAPEUTIC ACTIVITIES: CPT

## 2020-09-16 PROCEDURE — 25000003 PHARM REV CODE 250: Performed by: STUDENT IN AN ORGANIZED HEALTH CARE EDUCATION/TRAINING PROGRAM

## 2020-09-16 PROCEDURE — 83735 ASSAY OF MAGNESIUM: CPT

## 2020-09-16 PROCEDURE — 25000003 PHARM REV CODE 250: Performed by: NURSE PRACTITIONER

## 2020-09-16 PROCEDURE — 63600175 PHARM REV CODE 636 W HCPCS: Performed by: STUDENT IN AN ORGANIZED HEALTH CARE EDUCATION/TRAINING PROGRAM

## 2020-09-16 PROCEDURE — 20000000 HC ICU ROOM

## 2020-09-16 PROCEDURE — 85025 COMPLETE CBC W/AUTO DIFF WBC: CPT

## 2020-09-16 PROCEDURE — 99233 PR SUBSEQUENT HOSPITAL CARE,LEVL III: ICD-10-PCS | Mod: ,,, | Performed by: PSYCHIATRY & NEUROLOGY

## 2020-09-16 PROCEDURE — 94761 N-INVAS EAR/PLS OXIMETRY MLT: CPT

## 2020-09-16 PROCEDURE — 95720 PR EEG, W/VIDEO, CONT RECORD, I&R, >12<26 HRS: ICD-10-PCS | Mod: ,,, | Performed by: PSYCHIATRY & NEUROLOGY

## 2020-09-16 PROCEDURE — 97535 SELF CARE MNGMENT TRAINING: CPT

## 2020-09-16 PROCEDURE — A4216 STERILE WATER/SALINE, 10 ML: HCPCS | Performed by: NURSE PRACTITIONER

## 2020-09-16 PROCEDURE — 27000221 HC OXYGEN, UP TO 24 HOURS

## 2020-09-16 PROCEDURE — 99233 SBSQ HOSP IP/OBS HIGH 50: CPT | Mod: ,,, | Performed by: PSYCHIATRY & NEUROLOGY

## 2020-09-16 PROCEDURE — 85610 PROTHROMBIN TIME: CPT

## 2020-09-16 RX ORDER — SODIUM,POTASSIUM PHOSPHATES 280-250MG
2 POWDER IN PACKET (EA) ORAL
Status: DISCONTINUED | OUTPATIENT
Start: 2020-09-16 | End: 2020-09-18

## 2020-09-16 RX ORDER — POTASSIUM CHLORIDE 1.5 G/1.58G
40 POWDER, FOR SOLUTION ORAL
Status: DISCONTINUED | OUTPATIENT
Start: 2020-09-16 | End: 2020-09-18

## 2020-09-16 RX ORDER — LANOLIN ALCOHOL/MO/W.PET/CERES
800 CREAM (GRAM) TOPICAL
Status: DISCONTINUED | OUTPATIENT
Start: 2020-09-16 | End: 2020-09-18

## 2020-09-16 RX ORDER — POTASSIUM CHLORIDE 1.5 G/1.58G
60 POWDER, FOR SOLUTION ORAL
Status: DISCONTINUED | OUTPATIENT
Start: 2020-09-16 | End: 2020-09-18

## 2020-09-16 RX ORDER — ACETAMINOPHEN 325 MG/1
650 TABLET ORAL EVERY 4 HOURS PRN
Status: DISCONTINUED | OUTPATIENT
Start: 2020-09-16 | End: 2020-09-18

## 2020-09-16 RX ADMIN — Medication 3 ML: at 10:09

## 2020-09-16 RX ADMIN — ACETAMINOPHEN 650 MG: 325 TABLET ORAL at 09:09

## 2020-09-16 RX ADMIN — HEPARIN SODIUM 5000 UNITS: 5000 INJECTION INTRAVENOUS; SUBCUTANEOUS at 01:09

## 2020-09-16 RX ADMIN — VALPROATE SODIUM 750 MG: 100 INJECTION, SOLUTION INTRAVENOUS at 03:09

## 2020-09-16 RX ADMIN — Medication 3 ML: at 01:09

## 2020-09-16 RX ADMIN — POTASSIUM CHLORIDE 40 MEQ: 1.5 POWDER, FOR SOLUTION ORAL at 07:09

## 2020-09-16 RX ADMIN — THERA TABS 1 TABLET: TAB at 08:09

## 2020-09-16 RX ADMIN — SERTRALINE HYDROCHLORIDE 25 MG: 25 TABLET ORAL at 08:09

## 2020-09-16 RX ADMIN — CLOBAZAM 20 MG: 10 TABLET ORAL at 08:09

## 2020-09-16 RX ADMIN — ACETAMINOPHEN 650 MG: 325 TABLET ORAL at 07:09

## 2020-09-16 RX ADMIN — ONDANSETRON 4 MG: 2 INJECTION INTRAMUSCULAR; INTRAVENOUS at 07:09

## 2020-09-16 RX ADMIN — SODIUM CHLORIDE 200 MG: 0.9 INJECTION, SOLUTION INTRAVENOUS at 12:09

## 2020-09-16 RX ADMIN — LEVETIRACETAM 2000 MG: 100 INJECTION, SOLUTION, CONCENTRATE INTRAVENOUS at 08:09

## 2020-09-16 RX ADMIN — ACYCLOVIR SODIUM 500 MG: 1000 INJECTION, SOLUTION INTRAVENOUS at 05:09

## 2020-09-16 RX ADMIN — ACETAMINOPHEN 650 MG: 325 TABLET ORAL at 04:09

## 2020-09-16 RX ADMIN — Medication 3 ML: at 05:09

## 2020-09-16 RX ADMIN — POTASSIUM CHLORIDE 40 MEQ: 1.5 POWDER, FOR SOLUTION ORAL at 04:09

## 2020-09-16 RX ADMIN — ATORVASTATIN CALCIUM 40 MG: 20 TABLET, FILM COATED ORAL at 08:09

## 2020-09-16 RX ADMIN — LEVETIRACETAM 2000 MG: 100 INJECTION, SOLUTION, CONCENTRATE INTRAVENOUS at 09:09

## 2020-09-16 RX ADMIN — HEPARIN SODIUM 5000 UNITS: 5000 INJECTION INTRAVENOUS; SUBCUTANEOUS at 05:09

## 2020-09-16 RX ADMIN — ACYCLOVIR SODIUM 500 MG: 1000 INJECTION, SOLUTION INTRAVENOUS at 01:09

## 2020-09-16 RX ADMIN — ASPIRIN 81 MG CHEWABLE TABLET 81 MG: 81 TABLET CHEWABLE at 08:09

## 2020-09-16 RX ADMIN — ACYCLOVIR SODIUM 500 MG: 1000 INJECTION, SOLUTION INTRAVENOUS at 09:09

## 2020-09-16 RX ADMIN — HEPARIN SODIUM 5000 UNITS: 5000 INJECTION INTRAVENOUS; SUBCUTANEOUS at 09:09

## 2020-09-16 NOTE — PLAN OF CARE
[facility] Care Plan    POC reviewed with Donna Arias at 0300. Pt verbalized understanding. Questions and concerns addressed. No acute events overnight. NS at 75 ml/hr. Continuous EEG continued. Pt progressing toward goals. Will continue to monitor. See below and flowsheets for full assessment and VS info.       Neuro:  Clymer Coma Scale  Best Eye Response: 4-->(E4) spontaneous  Best Motor Response: 6-->(M6) obeys commands  Best Verbal Response: 4-->(V4) confused  Eben Coma Scale Score: 14  Assessment Qualifiers: patient not sedated/intubated, no eye obstruction present  Pupil PERRLA: yes     24hr Temp:  [97.9 °F (36.6 °C)-98.1 °F (36.7 °C)]     CV:   Rhythm: normal sinus rhythm  BP goals:   SBP < 220  MAP > 65    Resp:   O2 Device (Oxygen Therapy): nasal cannula  Oxygen Concentration (%): 24    Plan: N/A    GI/:  AVERY Total Score: 20  Diet/Nutrition Received: NPO  Last Bowel Movement: 09/14/20  Voiding Characteristics: external catheter, voids spontaneously without difficulty    Intake/Output Summary (Last 24 hours) at 9/16/2020 0515  Last data filed at 9/16/2020 0505  Gross per 24 hour   Intake 3418 ml   Output 3450 ml   Net -32 ml     Unmeasured Output  Urine Occurrence: 1  Stool Occurrence: 0  Emesis Occurrence: 0  Pad Count: 1    Labs/Accuchecks:  Recent Labs   Lab 09/16/20 0149   WBC 8.62   RBC 3.43*   HGB 9.9*   HCT 31.8*         Recent Labs   Lab 09/16/20  0149      K 3.2*   CO2 24      BUN 5*   CREATININE 0.6   ALKPHOS 64   ALT 14   AST 13   BILITOT 0.3      Recent Labs   Lab 09/12/20  1151  09/16/20  0149   INR 1.0   < > 1.0   APTT 26.2  --   --     < > = values in this interval not displayed.      Recent Labs   Lab 09/13/20  0107   CPK 75   CPKMB 1.2   TROPONINI <0.006   MB 1.6       Electrolytes: Electrolytes replaced  Accuchecks: Q6H    Gtts:   sodium chloride 0.9% 75 mL/hr at 09/16/20 0505       LDA/Wounds:  Lines/Drains/Airways       Drain              Female External Urinary  Catheter 09/13/20 1700 2 days         NG/OG Tube 09/14/20 2159 nasogastric 14 Fr. Right nostril 1 day              Peripheral Intravenous Line                   Peripheral IV - Single Lumen 09/13/20 0656 24 G Left Hand 2 days         Peripheral IV - Single Lumen 09/14/20 1211 20 G Anterior;Right Forearm 1 day         Peripheral IV - Single Lumen 09/14/20 1334 18 G Right Antecubital 1 day                  Wounds: Yes  Wound care consulted: Yes

## 2020-09-16 NOTE — PROGRESS NOTES
[facility]  Neurology-Epilepsy  Progress Note    Patient Name: Donna Arias  MRN: [id number]  Admission Date: 9/11/2020  Hospital Length of Stay: 5 days  Code Status: Full Code   Attending Provider: Balaji Kathleen MD  Primary Care Physician: Casey Richmond MD   Principal Problem:Seizure disorder    Subjective:     Hospital Course:   9/13>9/14: recurrent electroclinical seizures characterized by evolution of sharply contoured slowing over the right anterior quadrant into rhythmic delta -> theta activity with superimposed sharp/fast activity which abruptly ceases. Clinically, this activity is associated with prominent left head/eye version  9/14>9/15: Recurrent seizures throughout the day 9/14, anticonvulsant medications escalated. No seizures from approximately 18:24 on 9/14 - 0627 on 9/15 after 2 mg Ativan, Vimpat 400 mg x1. Four electroclinical seizures noted throughout the morning 9/15 (from [id number]), however overall frequency appears to be improving.  9/15>9/16: Study fluctuating, ictal-interictal continuum with less frequent discrete seizures. Continue current AED regimen.    Interval History: No noted clinical seizures overnight, EEG fluctuating, on ictal-interictal continuum. Trough VPA level this morning elevated. Plan to discontinue scheduled VPA for now, continue close EEG monitoring.    Current Facility-Administered Medications   Medication Dose Route Frequency Provider Last Rate Last Dose    0.9%  NaCl infusion   Intravenous Continuous Andres PHOEBE. Andmaurizio, NP 75 mL/hr at 09/16/20 1305      acetaminophen suppository 650 mg  650 mg Rectal Q6H PRN Andres Clements NP        acetaminophen tablet 650 mg  650 mg Oral Q4H PRN Casandra Noyola NP   650 mg at 09/16/20 0930    acyclovir (ZOVIRAX) 500 mg in dextrose 5 % 100 mL IVPB  10 mg/kg (Ideal) Intravenous Q8H Irwin Akers  mL/hr at 09/16/20 1317 500 mg at 09/16/20 1317    aspirin chewable tablet 81 mg  81 mg Per NG tube  Daily Luigi Andrews MD   81 mg at 09/16/20 0811    atorvastatin tablet 40 mg  40 mg Per NG tube Daily Luigi Andrews MD   40 mg at 09/16/20 0812    calcium gluconate 1g in dextrose 5% 100mL (ready to mix system)  1 g Intravenous PRN Gregory Ortega MD        cloBAZam Tab 20 mg  20 mg Per NG tube BID Luigi Andrews MD   20 mg at 09/16/20 0812    docusate 50 mg/5 mL liquid 100 mg  100 mg Per NG tube Daily Luigi Andrews MD        heparin (porcine) injection 5,000 Units  5,000 Units Subcutaneous Q8H Andres P. Andras, NP   5,000 Units at 09/16/20 1318    labetalol 20 mg/4 mL (5 mg/mL) IV syring  10 mg Intravenous Q4H PRN Andres P. Andras, NP        lacosamide (VIMPAT) 200 mg in sodium chloride 0.9% 100 mL IVPB  200 mg Intravenous Q12H Casandra ELDA Noyola,  mL/hr at 09/16/20 1229 200 mg at 09/16/20 1229    levETIRAcetam (KEPPRA) 2,000 mg in dextrose 5 % 250 mL IVPB  2,000 mg Intravenous Q12H Luigi Andrews  mL/hr at 09/16/20 0847 2,000 mg at 09/16/20 0847    lorazepam injection 2 mg  2 mg Intravenous Q1H PRN Gregory Ortega MD        magnesium oxide tablet 800 mg  800 mg Oral PRN Andres P. Andras, NP        magnesium oxide tablet 800 mg  800 mg Oral PRN Andres P. Andras, NP        magnesium sulfate 2g in water 50mL IVPB (premix)  2 g Intravenous PRN Gregory Ortega MD   2 g at 09/15/20 0312    magnesium sulfate 2g in water 50mL IVPB (premix)  4 g Intravenous PRN Gregory Ortega MD        multivitamin tablet 1 tablet  1 tablet Per NG tube Daily Luigi Andrews MD   1 tablet at 09/16/20 0811    ondansetron injection 4 mg  4 mg Intravenous Q6H PRN Andres P. Andras, NP        potassium chloride packet 40 mEq  40 mEq Oral PRN Andres P. Andras, NP        potassium chloride packet 40 mEq  40 mEq Oral PRN Andres P. Andras, NP   40 mEq at 09/16/20 0721    potassium chloride packet 60 mEq  60 mEq Oral PRN Andres Clements NP   60 mEq at 09/15/20 0506     potassium, sodium phosphates 280-160-250 mg packet 2 packet  2 packet Oral PRN Andres P. Andras, NP        potassium, sodium phosphates 280-160-250 mg packet 2 packet  2 packet Oral PRN Andres P. Andras, NP        potassium, sodium phosphates 280-160-250 mg packet 2 packet  2 packet Oral PRN Andres P. Andras, NP        sertraline tablet 25 mg  25 mg Per NG tube Daily Luigi Andrews MD   25 mg at 09/16/20 0811    sodium chloride 0.9% flush 10 mL  10 mL Intravenous PRN Ingrid Hughes MD        sodium chloride 0.9% flush 3 mL  3 mL Intravenous Q8H Andres P. Andras, NP 0 mL/hr at 09/13/20 0924 3 mL at 09/16/20 1318    sodium phosphate 15 mmol in dextrose 5 % 250 mL IVPB  15 mmol Intravenous PRN Gregory Ortega MD        sodium phosphate 20.01 mmol in dextrose 5 % 250 mL IVPB  20.01 mmol Intravenous PRN Gregory Ortega MD        sodium phosphate 30 mmol in dextrose 5 % 250 mL IVPB  30 mmol Intravenous PRN Gregory Ortega MD        valproate (DEPACON) 750 mg in dextrose 5 % 100 mL IVPB  750 mg Intravenous Q8H Luigi Andrwes MD   Stopped at 09/16/20 1200     Continuous Infusions:   sodium chloride 0.9% 75 mL/hr at 09/16/20 1305       Review of Systems   Constitutional: Negative for chills and fever.   Eyes: Negative for photophobia and redness.   Respiratory: Negative for cough and shortness of breath.    Cardiovascular: Negative for chest pain and leg swelling.   Gastrointestinal: Negative for nausea and vomiting.   Musculoskeletal: Positive for neck pain. Negative for neck stiffness.   Skin: Negative for pallor.   Neurological: Positive for seizures. Negative for dizziness, speech difficulty, weakness and headaches.   Psychiatric/Behavioral: Positive for hallucinations. Negative for agitation. The patient is not hyperactive.      Objective:     Vital Signs (Most Recent):  Temp: 98.2 °F (36.8 °C) (09/16/20 1105)  Pulse: 78 (09/16/20 1305)  Resp: (!) 23 (09/16/20 1305)  BP: 133/63  (09/16/20 1305)  SpO2: 99 % (09/16/20 1305) Vital Signs (24h Range):  Temp:  [98 °F (36.7 °C)-98.3 °F (36.8 °C)] 98.2 °F (36.8 °C)  Pulse:  [] 78  Resp:  [15-27] 23  SpO2:  [97 %-100 %] 99 %  BP: ()/(54-72) 133/63     Weight: 74.9 kg (165 lb 2 oz)  Body mass index is 30.2 kg/m².    Physical Exam  Vitals signs and nursing note reviewed.   Constitutional:       Appearance: Normal appearance. She is normal weight.   HENT:      Head: Normocephalic.   Eyes:      Extraocular Movements: Extraocular movements intact.      Conjunctiva/sclera: Conjunctivae normal.      Pupils: Pupils are equal, round, and reactive to light.   Neck:      Musculoskeletal: Neck supple. No neck rigidity.   Cardiovascular:      Pulses: Normal pulses.   Pulmonary:      Effort: Pulmonary effort is normal. No respiratory distress.   Musculoskeletal:         General: No deformity.   Skin:     General: Skin is warm and dry.   Neurological:      Mental Status: She is oriented to person, place, and time.         NEUROLOGICAL EXAMINATION:     MENTAL STATUS   Oriented to person, place, and time.   Level of consciousness: drowsy       Drowsy, arouses to verbal stimuli, follows commands and answers orientation questions     CRANIAL NERVES     CN III, IV, VI   Pupils are equal, round, and reactive to light.    CN VII   Left facial weakness: mild left lower facial weakness.    CN VIII   CN VIII normal.       Significant Labs: All pertinent lab results from the past 24 hours have been reviewed.    Significant Studies: I have reviewed all pertinent imaging results/findings within the past 24 hours.    Assessment and Plan:     * Seizure disorder  59 yo female with no known history of seizures transferred from [facility] for management of recurrent episodes concerning for seizure. Started on Vimpat, Keppra, Onfi at OSH. EEG initiated 9/13>9/14 showing recurrent electroclinical seizures with clinical association of prominent left head/eye  "version.    Recommendations:   - Continue vEEG - continues to be very active, however overall seizure frequency appears to be improving  - Continue Onfi 20 mg BID, Vimpat 200 mg BID, Keppra 2000 mg BID  - Hold VPA, plan to give additional doses if EEG more active with more discrete seizrues  - LP completed 9/13, Protein 167, further CSF results pending. On empiric Acyclovir  - Management of acute infectious/metabolic abnormalities per NCC, consider further workup of possible underlying autoimmune encephalitis    Plan of care discussed with NCC team, patient at bedside. Will continue to vollow.    Hallucinations  - Patient reports intermittent hallucinations prior to current hospitalization, "seeing a person in the next room"  - Psychiatry following  - Follow up CSF results, infectious workup    Abnormal CT of brain  - MRI brain 9/12/20 showed restricted diffusion and edema throughout the right frontal lobe with sulcal effacement.  Findings concerning for acute infarction in the right CESILIA territory  - Case discussed in Neuroradiology conference 9/16, not thought to represent acute stroke. ? Underlying inflammatory changes    Cervical disc disease  - Reports chronic neck pain         VTE Risk Mitigation (From admission, onward)         Ordered     heparin (porcine) injection 5,000 Units  Every 8 hours      09/12/20 1930     IP VTE LOW RISK PATIENT  Once      09/11/20 1915     Place sequential compression device  Until discontinued      09/11/20 1718                Lucille Ramos PA-C  Neurology-Epilepsy  [facility]  Staff: Dr. Funes  "

## 2020-09-16 NOTE — ASSESSMENT & PLAN NOTE
-- Continue Neuro checks q 1hr  -- Vascular Neurology consulted-  -- MRI (9/12/20) - changes involving the right frontal lobe increased signal, cortical banding which can be seen with seizure activity. Given presentation stroke less likely.   -- SBP goal <220  -- PT/OT/Speech  -- ASA 81 mg daily, Atorvastatin 40 mg daily, SQH 5000 units q8h  -- SQ heparin.     9/16- Very unlikely causes by cerebral infarct or ischemia. More concern of autoimmune etiology causing seizures per epilepsy, appreciate reccs.

## 2020-09-16 NOTE — PROGRESS NOTES
[facility]  Neurocritical Care  Progress Note    Admit Date: 9/11/2020  Service Date: 09/16/2020  Length of Stay: 5    Subjective:     Chief Complaint: Seizure disorder    History of Present Illness: Ms Arias is a 59 yo female with sig pmhx of depression with noncompliance of meds for a month, Anxiety, and HTN who presents to [facility] for a higher level of care for right CESILIA and R/O seizure. Patient also PEC on 9/11/20 at OSH for Hallucinations. Per OSH note pt had continued seizure activity occurring every 5 min and was treated with Keppra and Vimpat. Neurology attempted lumbar puncture x 2, but was unsuccessful. CT scan and MRI brain revealed increased signal in the right frontal lobe that Neurology thought might be consistent with seizure activity. Patient is currently AAO x 3 and has no neurological deficits on exam.         Hospital Course: 9/12/2020 Admit to [facility], EEG  09/13/2020: No epilepsy on EEG. Successful LP once Pt arrived on the floor. Pt with waxing and waning neuro exam. Upon arrival Pt able to answer questions and able to follow some commands.   9/14/2020: NAEON, on EEG-   9/15/2020: 2 clinical seizures per nurse early this morning, pending epilepsy reccs, continue EEG monitoring  09/16/2020: cEEG. Start TF. Autoimmune workup pending. Continue to adjust AEDs, appreciate epilepsy reccs.    Interval History:  Reduced clinical seizures overnight. Autoimmune workup pending. PCR pending, will continue acyclovir until final report. Considered steroids, however in the setting of clinical improvement will continue the course with AEDs and antivirals. Will start tube feeds, and will allow clear liquid diet for now. Will not advanced today, pt is still very drowsy and high concern for aspiration.     Review of Systems   Constitutional: Negative for chills and fever.   Eyes: Negative for photophobia and redness.   Respiratory: Negative for cough and shortness of breath.    Cardiovascular:  Negative for chest pain and leg swelling.   Gastrointestinal: Negative for nausea and vomiting.   Musculoskeletal: Positive for neck pain. Negative for neck stiffness.   Skin: Negative for pallor.   Neurological: Positive for seizures. Negative for dizziness, speech difficulty, weakness and headaches.   Psychiatric/Behavioral: Negative for agitation and hallucinations. The patient is not hyperactive.      Objective:     Vitals:  Temp: 98.3 °F (36.8 °C)  Pulse: 82  Rhythm: normal sinus rhythm  BP: (!) 115/56  MAP (mmHg): 80  Resp: (!) 23  SpO2: 98 %  Oxygen Concentration (%): 24  O2 Device (Oxygen Therapy): room air    Temp  Min: 98 °F (36.7 °C)  Max: 98.3 °F (36.8 °C)  Pulse  Min: 60  Max: 101  BP  Min: 93/54  Max: 160/72  MAP (mmHg)  Min: 76  Max: 104  Resp  Min: 15  Max: 27  SpO2  Min: 98 %  Max: 100 %  Oxygen Concentration (%)  Min: 24  Max: 28    09/15 0701 - 09/16 0700  In: 3420 [I.V.:2020]  Out: 3450 [Urine:3450]   Unmeasured Output  Urine Occurrence: 1  Stool Occurrence: 0  Emesis Occurrence: 0  Pad Count: 1       Physical Exam  Vitals signs and nursing note reviewed.   Constitutional:       Appearance: Normal appearance. She is normal weight. She is not ill-appearing.   HENT:      Head: Normocephalic.      Right Ear: External ear normal.      Left Ear: External ear normal.   Eyes:      General: No scleral icterus.     Extraocular Movements: Extraocular movements intact.      Conjunctiva/sclera: Conjunctivae normal.      Pupils: Pupils are equal, round, and reactive to light.   Neck:      Musculoskeletal: Neck supple. No neck rigidity.   Cardiovascular:      Rate and Rhythm: Normal rate and regular rhythm.      Pulses: Normal pulses.   Pulmonary:      Effort: Pulmonary effort is normal. No respiratory distress.      Breath sounds: Normal breath sounds.   Abdominal:      General: Abdomen is flat. Bowel sounds are normal.      Palpations: Abdomen is soft.   Musculoskeletal:         General: No deformity.    Skin:     General: Skin is warm and dry.      Capillary Refill: Capillary refill takes less than 2 seconds.   Neurological:      Mental Status: She is oriented to person, place, and time.      Comments: Pt drowsy  Oriented x4  Follows commands   Speech is slow but clear  Cranial nerves assessed and are grossly intact  No pronator drift          Medications:  Continuoussodium chloride 0.9%, Last Rate: 75 mL/hr at 09/16/20 0905    Scheduledacyclovir, 10 mg/kg (Ideal), Q8H  aspirin, 81 mg, Daily  atorvastatin, 40 mg, Daily  cloBAZam, 20 mg, BID  docusate, 100 mg, Daily  heparin (porcine), 5,000 Units, Q8H  lacosamide (VIMPAT) IVPB, 200 mg, Q12H  levetiracetam IVPB, 2,000 mg, Q12H  multivitamin, 1 tablet, Daily  sertraline, 25 mg, Daily  sodium chloride 0.9%, 3 mL, Q8H  valproate sodium (DEPACON) IVPB, 750 mg, Q8H    PRNacetaminophen, 650 mg, Q6H PRN  acetaminophen, 650 mg, Q4H PRN  calcium gluconate IVPB, 1 g, PRN  labetalol, 10 mg, Q4H PRN  lorazepam, 2 mg, Q1H PRN  magnesium oxide, 800 mg, PRN  magnesium oxide, 800 mg, PRN  magnesium sulfate IVPB, 2 g, PRN  magnesium sulfate IVPB, 4 g, PRN  ondansetron, 4 mg, Q6H PRN  potassium chloride, 40 mEq, PRN  potassium chloride, 40 mEq, PRN  potassium chloride, 60 mEq, PRN  potassium, sodium phosphates, 2 packet, PRN  potassium, sodium phosphates, 2 packet, PRN  potassium, sodium phosphates, 2 packet, PRN  sodium chloride 0.9%, 10 mL, PRN  sodium phosphate IVPB, 15 mmol, PRN  sodium phosphate IVPB, 20.01 mmol, PRN  sodium phosphate IVPB, 30 mmol, PRN      Today I personally reviewed pertinent laboratory results, notably: PCR pending     Diet  Diet NPO Except for: Medication  Diet NPO Except for: Medication        Assessment/Plan:     Neuro  * Seizure disorder  -- Continue Neuro checks q 1hr  -- Epilepsy Service consulted- recommend 2 mg of ativan as needed if Pt has catatonic like seizure.   -- Seizure Precautions  -- Continue vEEG  -- Continue cloBAZam Tab 10 mg, Vimpat  200 mg bid, and Keppra 1000 mg bid  -- concern for encephalitis due to infectious etiology- LP performed 9/13/20. Follow up infectious work-up. Consider acyclovir and antimicrobials if Neuro status does not improve or clinically Pt declines.   9/16: continue AEDs (vimpat , keppra, clobazam, valproate)-appreciate epilepsy reccs      Stroke  -- Continue Neuro checks q 1hr  -- Vascular Neurology consulted-  -- MRI (9/12/20) - changes involving the right frontal lobe increased signal, cortical banding which can be seen with seizure activity. Given presentation stroke less likely.   -- SBP goal <220  -- PT/OT/Speech  -- ASA 81 mg daily, Atorvastatin 40 mg daily, SQH 5000 units q8h  -- SQ heparin.     9/16- Very unlikely causes by cerebral infarct or ischemia. More concern of autoimmune etiology causing seizures per epilepsy, appreciate reccs.     Renal/  Hypokalemia  -- electrolyte replacement ordered  -k 3.2 this morning- replaced- pending repeat K          The patient is being Prophylaxed for:  Venous Thromboembolism with: Not Applicable   Stress Ulcer with: Not Applicable   Ventilator Pneumonia with: not applicable    Activity Orders          Diet clear liquid: Clear Liquid starting at 09/16 0950        Full Code  Critical Care Time: 33 mins   Misty Varner NP  Neurocritical Care  [facility]

## 2020-09-16 NOTE — PT/OT/SLP PROGRESS
"Occupational Therapy   Treatment    Name: Donna Arias  MRN: [id number]  Admitting Diagnosis:  Seizure disorder       Recommendations:     Discharge Recommendations: rehabilitation facility  Discharge Equipment Recommendations:  none  Barriers to discharge:  Decreased caregiver support, Other (Comment)(Level of skilled assistance required at this time)    Assessment:     Donna Arias is a 60 y.o. female with a medical diagnosis of Seizure disorder.  She presents with the following performance deficits affecting function are weakness, impaired endurance, impaired self care skills, impaired functional mobilty, gait instability, impaired balance, impaired cognition, decreased upper extremity function, decreased lower extremity function, pain, decreased safety awareness, impaired coordination, impaired fine motor.     Patient participated well with therapy, demonstrated a lot of progress this session, completed bed mobility and sat edge of bed x 12 minutes with contact guard assist, sit to stand with minimal and hand-held assist; side steps with minimal and HHA and mod cueing due to impaired coordination. Patient oriented x 4, perseverating on wanting "ice cream and diet sprite" and distractible and impulsive through out session, therefore requiring [facility] assist for safety and management of (B) UE's. Patient will continue to benefit from intensive and collaborative therapy during this admission and placement in rehabilitation facility to maximize functional independence.     Rehab Prognosis:  Good; patient would benefit from acute skilled OT services to address these deficits and reach maximum level of function.       Plan:     Patient to be seen 4 x/week to address the above listed problems via self-care/home management, therapeutic activities, therapeutic exercises, cognitive retraining  · Plan of Care Expires: 10/13/20  · Plan of Care Reviewed with: patient    Subjective     "This thing is hurting me, in my nose -- she " "must have put it way down in there. It hurts."    "They said I could have it, the doctors did, a diet sprite and ice cream. They haven't brought it. Ha."    Pain/Comfort:  · Pain Rating 1: 0/10  · Pain Rating Post-Intervention 1: 0/10    Objective:   Communicated with: nursing prior to session.  Patient found supine with bed alarm, blood pressure cuff, EEG, PureWick, peripheral IV, pulse ox (continuous), telemetry, pressure relief boots, SCD upon OT entry to room.    General Precautions: Standard, fall, seizure   Orthopedic Precautions:N/A   Braces: N/A     Occupational Performance:     Bed Mobility:    · Patient completed Rolling/Turning to Left with  contact guard assistance  · Patient completed Scooting/Bridging with contact guard assistance  · Patient completed Supine to Sit with contact guard assistance  · Patient completed Sit to Supine with minimum assistance     Functional Mobility/Transfers:  · Patient completed Sit <> Stand Transfer with minimum assistance  with  hand-held assist   · Functional Mobility: Moderate assistance with hand-held assist to complete side steps at edge of bed, manual assist to complete steps to head of bed  · Increased time required due to impaired coordination/initiation  · Mod cueing to attend to task, step sequence    Activities of Daily Living:  · Grooming: minimum assistance seated edge of bed, assist provided for management of Ng as patient with impared awareness  · Upper Body Dressing: minimum assistance seated edge of bed, increased cues for attention to task and management of (B) UE's    [facility] 6 Click ADL: 16    Treatment & Education:  -Patient sat edge of bed x 12 minutes with contact guard assist, occasional minimal to moderate assist as patient attempting to return self to supine numerous times; therapist managing (B) UE's due to impulsivity.  -Patient educated on roles/goals of OT and POC.  -White board updated.  -Therapist provided time for questions and answered " within scope of practice.  -Patient educated on importance of EOB/OOB activity to maximize recovery.    Patient left supine with all lines intact, call button in reach, restraints reapplied at end of session and nursing notifiedEducation:      GOALS:   Multidisciplinary Problems     Occupational Therapy Goals        Problem: Occupational Therapy Goal    Goal Priority Disciplines Outcome Interventions   Occupational Therapy Goal     OT, PT/OT Ongoing, Progressing    Description: Goals to be met by: 9/28/2020     Patient will increase functional independence with ADLs by performing:    Grooming while EOB with Minimal Assistance.  Toileting from bedside commode with Minimal Assistance for hygiene and clothing management.   Sitting at edge of bed x10 minutes with Contact Guard Assistance while engaging in functional tasks   Stand pivot transfers with Minimal Assistance.  Toilet transfer to bedside commode with Minimal Assistance.                     Time Tracking:     OT Date of Treatment: 09/16/20  OT Start Time: 1140  OT Stop Time: 1205  OT Total Time (min): 25 min    Billable Minutes:Self Care/Home Management 10  Therapeutic Activity 15    Misty Hearn OT  9/16/2020

## 2020-09-16 NOTE — PT/OT/SLP PROGRESS
"Physical Therapy Treatment    Patient Name:  Donna Arias   MRN:  [id number]    Recommendations:     Discharge Recommendations:  rehabilitation facility   Discharge Equipment Recommendations: none   Barriers to discharge: increased level of assistance needed, fall risk    Assessment:     Donna Arias is a 60 y.o. female admitted with a medical diagnosis of Seizure disorder.  She presents with the following impairments/functional limitations:  weakness, impaired endurance, impaired sensation, impaired self care skills, impaired functional mobilty, gait instability, impaired balance, impaired cognition, decreased upper extremity function, decreased lower extremity function, decreased safety awareness. PT recommending inpatient rehab upon DC from hospital.    Rehab Prognosis: Good; patient would benefit from acute skilled PT services to address these deficits and reach maximum level of function.    Recent Surgery: * No surgery found *      Plan:     During this hospitalization, patient to be seen 4 x/week to address the identified rehab impairments via gait training, therapeutic activities, therapeutic exercises, neuromuscular re-education and progress toward the following goals:    · Plan of Care Expires:  10/14/20    Subjective     Chief Complaint: hungry  Patient/Family Comments/goals: "I want a diet sprite"  Pain/Comfort:  · Pain Rating 1: 0/10  · Pain Rating Post-Intervention 1: 0/10      Objective:     Communicated with Rn prior to session.  Patient found HOB elevated with bed alarm, blood pressure cuff, EEG, NG tube, pressure relief boots, PureWick, peripheral IV, pulse ox (continuous), restraints, SCD, telemetry upon PT entry to room.     General Precautions: Standard, fall, seizure   Orthopedic Precautions:N/A   Braces: N/A     Functional Mobility:  · Bed Mobility:     · Rolling Left:  contact guard assistance  · Scooting: contact guard assistance  · Supine to Sit: contact guard assistance  · Sit to Supine: " contact guard assistance  · Transfers:     · Sit to Stand:  minimum assistance with no AD and hand-held assist  · Gait: 4 steps left, mod A for phsyical assistance, max VCing  · EOB: x 12 mins, CGA, but min A-mod A when patient attempting to return to supine  · Balance:   · Static Sitting: CGA to modA  · Dynamic Sitting: Nikole/modA   · Static Standing: Nikole  · Dynamic Standing: modA for steps      AM-PAC 6 CLICK MOBILITY  Turning over in bed (including adjusting bedclothes, sheets and blankets)?: 3  Sitting down on and standing up from a chair with arms (e.g., wheelchair, bedside commode, etc.): 3  Moving from lying on back to sitting on the side of the bed?: 3  Moving to and from a bed to a chair (including a wheelchair)?: 3  Need to walk in hospital room?: 2  Climbing 3-5 steps with a railing?: 1  Basic Mobility Total Score: 15       Therapeutic Activities and Exercises:  Patient educated on role of therapy, goals of session, and benefits of mobilizing.   Discussed PT plan of care during hospitalization.   Patient educated on calling for assistance.   Patient educated on how their diagnosis impacts their mobility within PT scope of practice.   Communication board up to date.  All questions answered within PT scope of practice.      Patient left HOB elevated with all lines intact, call button in reach, bed alarm on, restraints reapplied at end of session and RN notified.    GOALS:   Multidisciplinary Problems     Physical Therapy Goals        Problem: Physical Therapy Goal    Goal Priority Disciplines Outcome Goal Variances Interventions   Physical Therapy Goal     PT, PT/OT Ongoing, Progressing     Description: Goals to be met by: 2020    Patient will increase functional independence with mobility by performin. Pt will perform bed mobility (rolling L/R, scooting, and bridging) with Nikole. - goal met 2020   Updated: modified independent  2. Pt will perform supine to/from sit with Nikole. - goal met  9/16/2020   Updated: modified independent  3. Pt will sit EOB x 15 mins with B UE support with min assistance.  4. Pt will perform sit to stand with min assistance. - goal met 9/16/2020   Updated: modified independent  5. Pt will ambulate 50 feet with min assistance.  6. Pt will perform there-ex from handout x 15 reps to improve strength for functional mobility.  7. Pt will ascend/descend 2 steps with 1 HR and Nikole.                         Time Tracking:     PT Received On: 09/16/20  PT Start Time: 1140     PT Stop Time: 1205  PT Total Time (min): 25 min   Additional staffing present: OT    Billable Minutes: Therapeutic Activity 8 and Neuromuscular Re-education 15    Treatment Type: Treatment  PT/PTA: PT     PTA Visit Number: 0     Pattie Sinha, PT  09/16/2020

## 2020-09-16 NOTE — PROCEDURES
[facility] EEG/VIDEO MONITORING REPORT  Donna Arias  [id number]  1960    DATE OF SERVICE:  09/15/2020  DATE OF ADMISSION: 9/11/2020  8:50 AM    ADMITTING/REQUESTING PROVIDER: Irwin Akers MD    REASON FOR CONSULT:  60-year-old woman with right frontal quadrant diffusion/FLAIR changes on MRI and recurrent episodes of forced left head and eye version.  Evaluate for evidence of epileptiform activity.    METHODOLOGY   Electroencephalographic (EEG) recording is with electrodes placed according to the International 10-20 placement system.  Thirty two (32) channels of digital signal (sampling rate of 512/sec) including T1 and T2 was simultaneously recorded from the scalp and may include  EKG, EMG, and/or eye monitors.  Recording band pass was 0.1 to 512 hz.  Digital video recording of the patient is simultaneously recorded with the EEG.  The patient is instructed report clinical symptoms which may occur during the recording session.  EEG and video recording is stored and archived in digital format.  Activation procedures which include photic stimulation, hyperventilation and instructing patients to perform simple task are done in selected patients.   The EEG is displayed on a monitor screen and can be reviewed using different montages.  Computer assisted analysis is employed to detect spike and electrographic seizure activity.   The entire record is submitted for computer analysis.  The entire recording is visually reviewed and the times identified by computer analysis as being spikes or seizures are reviewed again.  Compresses spectral analysis (CSA) is also performed on the activity recorded from each individual channel.  This is displayed as a power display of frequencies from 0 to 30 Hz over time.   The CSA is reviewed looking for asymmetries in power between homologous areas of the scalp and then compared with the original EEG recording.     Inaura software is also utilized in the review of this study.  This  software suite analyzes the EEG recording in multiple domains.  Coherence and rhythmicity is computed to identify EEG sections which may contain organized seizures.  Each channel undergoes analysis to detect presence of spike and sharp waves which have special and morphological characteristic of epileptic activity.  The routine EEG recording is converted from spacial into frequency domain.  This is then displayed comparing homologous areas to identify areas of significant asymmetry.  Algorithm to identify non-cortically generated artifact is used to separate eye movement, EMG and other artifact from the EEG.      RECORDING TIMES  Start on 09/15/2020 at 07:00 a.m.  Stop on 09/16/2020 at 07:00 a.m.  A total of 23 hr and 52 min of EEG recording is obtained.    EEG FINDINGS  Background activity:   The background is mixed alpha/theta activity with plenty of overriding beta frequencies seen diffusely.  There are very brief periods of generalized delta activity.  There are intermittent runs of broad frontally predominant periodic discharges over the right hemisphere at 1-1.5 hz.  On several occasions, i.e. 07:34 a.m., 08:04 a.m., 08:18 a.m., this activity increases in frequency to sharply contoured alpha/beta activity with embedded sharp waves which generalizes then abruptly ceases.  These episodes are associated clinically with a prominent left head turn.  For the remainder of the study, although there are no prominent left head turn episodes identified, the activity over the right frontal quadrant fluctuates in amplitude, sharpness, degree of superimposed fast features, and rhythmicity.  Intermittently, there is evolution of the frequency/morphology of the right frontal quadrant activity meeting criteria for subclinical seizures.  However, the patient lies quietly during these episodes with no abnormal vocalizations or behaviors.  The overall burden of right anterior quadrant fast/sharp activity becomes somewhat less  prominent as the study progresses.    Sleep:  There is poorly formed sleep architecture    Activation procedures:   Hyperventilation is not performed  Photic stimulation is not performed    Cardiac Monitor:   Heart rate appears generally regular on a single lead EKG.    Impression:   This is an abnormal continuous EEG monitoring study because of recurrent electrographic seizures initially associated with a prominent left head turn which then become subclinical as the recording progresses.  There is fluctuation of the epileptiform activity over the right frontal quadrant often times with evolution meeting criteria for subclinical seizures consistent with the ictal/interictal continuum.  Overriding fast activity is often seen as a medication effect.  Overall, the frequency, duration, and associated clinical features of the seizures become significantly less prominent and less organized as the study progresses which is an overall modest improvement.    LTM Summary 09/13/2020 - TBD:  Patient events/Seizures:   recurrent electroclinical seizures characterized by evolution of sharply contoured slowing over the right anterior quadrant into rhythmic delta -> theta activity with superimposed sharp/fast activity which generalizes then abruptly ceases.  Clinically, this activity is associated with prominent left head/eye version.  Slight decrease frequency and duration of seizures after loading valproic acid.  Prolonged, but not sustained, interruption in seizures after an additional load of 400 mg lacosamide in 2 mg lorazepam.  After this, progressively less prominent and less organized fluctuating epileptiform activity over the right frontal quadrant.  Interictal findings:  long runs of sharply contoured slowing over the right anterior frontal region often with embedded spike and wave discharges at approximately 1 hz or superimposed fluctuating fast/sharp activity.  Other notable abnormalities:  disorganized asynchronous  slowing, right>left; increasing amounts of overriding beta activity as the study progresses (presumably from clobazam)    Elizabeth Funes MD PhD  Neurology-Epilepsy  [facility].  [facility]

## 2020-09-16 NOTE — ASSESSMENT & PLAN NOTE
59 yo female with no known history of seizures transferred from [facility] for management of recurrent episodes concerning for seizure. Started on Vimpat, Keppra, Onfi at OSH. EEG initiated 9/13>9/14 showing recurrent electroclinical seizures with clinical association of prominent left head/eye version.    Recommendations:   - Continue vEEG - continues to be very active, however overall seizure frequency appears to be improving  - Continue Onfi 20 mg BID, Vimpat 200 mg BID, Keppra 2000 mg BID  - Hold VPA, plan to give additional doses if EEG more active with more discrete seizrues  - LP completed 9/13, Protein 167, further CSF results pending. On empiric Acyclovir  - Management of acute infectious/metabolic abnormalities per NCC, consider further workup of possible underlying autoimmune encephalitis    Plan of care discussed with NCC team, patient at bedside. Will continue to vollow.

## 2020-09-16 NOTE — ASSESSMENT & PLAN NOTE
- MRI brain 9/12/20 showed restricted diffusion and edema throughout the right frontal lobe with sulcal effacement.  Findings concerning for acute infarction in the right CESILIA territory  - Case discussed in Neuroradiology conference 9/16, not thought to represent acute stroke. ? Underlying inflammatory changes

## 2020-09-16 NOTE — SUBJECTIVE & OBJECTIVE
Interval History:  Reduced clinical seizures overnight. Autoimmune workup pending. PCR pending, will continue acyclovir until final report. Considered steroids, however in the setting of clinical improvement will continue the course with AEDs and antivirals. Will start tube feeds, and will allow clear liquid diet for now. Will not advanced today, pt is still very drowsy and high concern for aspiration.     Review of Systems   Constitutional: Negative for chills and fever.   Eyes: Negative for photophobia and redness.   Respiratory: Negative for cough and shortness of breath.    Cardiovascular: Negative for chest pain and leg swelling.   Gastrointestinal: Negative for nausea and vomiting.   Musculoskeletal: Positive for neck pain. Negative for neck stiffness.   Skin: Negative for pallor.   Neurological: Positive for seizures. Negative for dizziness, speech difficulty, weakness and headaches.   Psychiatric/Behavioral: Negative for agitation and hallucinations. The patient is not hyperactive.      Objective:     Vitals:  Temp: 98.3 °F (36.8 °C)  Pulse: 82  Rhythm: normal sinus rhythm  BP: (!) 115/56  MAP (mmHg): 80  Resp: (!) 23  SpO2: 98 %  Oxygen Concentration (%): 24  O2 Device (Oxygen Therapy): room air    Temp  Min: 98 °F (36.7 °C)  Max: 98.3 °F (36.8 °C)  Pulse  Min: 60  Max: 101  BP  Min: 93/54  Max: 160/72  MAP (mmHg)  Min: 76  Max: 104  Resp  Min: 15  Max: 27  SpO2  Min: 98 %  Max: 100 %  Oxygen Concentration (%)  Min: 24  Max: 28    09/15 0701 - 09/16 0700  In: 3420 [I.V.:2020]  Out: 3450 [Urine:3450]   Unmeasured Output  Urine Occurrence: 1  Stool Occurrence: 0  Emesis Occurrence: 0  Pad Count: 1       Physical Exam  Vitals signs and nursing note reviewed.   Constitutional:       Appearance: Normal appearance. She is normal weight. She is not ill-appearing.   HENT:      Head: Normocephalic.      Right Ear: External ear normal.      Left Ear: External ear normal.   Eyes:      General: No scleral icterus.      Extraocular Movements: Extraocular movements intact.      Conjunctiva/sclera: Conjunctivae normal.      Pupils: Pupils are equal, round, and reactive to light.   Neck:      Musculoskeletal: Neck supple. No neck rigidity.   Cardiovascular:      Rate and Rhythm: Normal rate and regular rhythm.      Pulses: Normal pulses.   Pulmonary:      Effort: Pulmonary effort is normal. No respiratory distress.      Breath sounds: Normal breath sounds.   Abdominal:      General: Abdomen is flat. Bowel sounds are normal.      Palpations: Abdomen is soft.   Musculoskeletal:         General: No deformity.   Skin:     General: Skin is warm and dry.      Capillary Refill: Capillary refill takes less than 2 seconds.   Neurological:      Mental Status: She is oriented to person, place, and time.      Comments: Pt drowsy  Oriented x4  Follows commands   Speech is slow but clear  Cranial nerves assessed and are grossly intact  No pronator drift          Medications:  Continuoussodium chloride 0.9%, Last Rate: 75 mL/hr at 09/16/20 0905    Scheduledacyclovir, 10 mg/kg (Ideal), Q8H  aspirin, 81 mg, Daily  atorvastatin, 40 mg, Daily  cloBAZam, 20 mg, BID  docusate, 100 mg, Daily  heparin (porcine), 5,000 Units, Q8H  lacosamide (VIMPAT) IVPB, 200 mg, Q12H  levetiracetam IVPB, 2,000 mg, Q12H  multivitamin, 1 tablet, Daily  sertraline, 25 mg, Daily  sodium chloride 0.9%, 3 mL, Q8H  valproate sodium (DEPACON) IVPB, 750 mg, Q8H    PRNacetaminophen, 650 mg, Q6H PRN  acetaminophen, 650 mg, Q4H PRN  calcium gluconate IVPB, 1 g, PRN  labetalol, 10 mg, Q4H PRN  lorazepam, 2 mg, Q1H PRN  magnesium oxide, 800 mg, PRN  magnesium oxide, 800 mg, PRN  magnesium sulfate IVPB, 2 g, PRN  magnesium sulfate IVPB, 4 g, PRN  ondansetron, 4 mg, Q6H PRN  potassium chloride, 40 mEq, PRN  potassium chloride, 40 mEq, PRN  potassium chloride, 60 mEq, PRN  potassium, sodium phosphates, 2 packet, PRN  potassium, sodium phosphates, 2 packet, PRN  potassium, sodium  phosphates, 2 packet, PRN  sodium chloride 0.9%, 10 mL, PRN  sodium phosphate IVPB, 15 mmol, PRN  sodium phosphate IVPB, 20.01 mmol, PRN  sodium phosphate IVPB, 30 mmol, PRN      Today I personally reviewed pertinent laboratory results, notably: PCR pending     Diet  Diet NPO Except for: Medication  Diet NPO Except for: Medication

## 2020-09-16 NOTE — PLAN OF CARE
Pt is progressing towards goals as expected. Goals remain appropriate continue with current POC.     Pattie Sinha, PT, DPT  2020      Problem: Physical Therapy Goal  Goal: Physical Therapy Goal  Description: Goals to be met by: 2020    Patient will increase functional independence with mobility by performin. Pt will perform bed mobility (rolling L/R, scooting, and bridging) with Nikole. - goal met 2020   Updated: modified independent  2. Pt will perform supine to/from sit with Nikole. - goal met 2020   Updated: modified independent  3. Pt will sit EOB x 15 mins with B UE support with min assistance.  4. Pt will perform sit to stand with min assistance. - goal met 2020   Updated: modified independent  5. Pt will ambulate 50 feet with min assistance.  6. Pt will perform there-ex from handout x 15 reps to improve strength for functional mobility.  7. Pt will ascend/descend 2 steps with 1 HR and Nikole.        Outcome: Ongoing, Progressing

## 2020-09-16 NOTE — PLAN OF CARE
Problem: SLP Goal  Goal: SLP Goal  Description: Speech Language Pathology Goals  Goals expected to be met by 9/21    1. Pt will tolerate regular solids and thin liquid diet with no overt clinical signs of airway compromise.  2. Pt will participate in ongoing assessment of cognitive linguistic aspects.  MET          Outcome: Ongoing, Progressing   Ongoing cognitive assessment completed. SLP will follow up for dysphagia therapy when pt appropriate for PO trials.   Desirae Sanders CCC-SLP  9/16/2020

## 2020-09-16 NOTE — PT/OT/SLP PROGRESS
Speech Language Pathology Treatment    Patient Name:  Donna Arias   MRN:  [id number]  Admitting Diagnosis: Seizure disorder    Recommendations:                 General Recommendations:  Dysphagia therapy  Diet recommendations:  Regular, Liquid Diet Level: Thin   Aspiration Precautions:pt NPO at this time 2/2 seizures, SLP will follow up for ongoing assessment when cleared by MD  General Precautions: Standard, fall, seizure  Communication strategies:  none    Subjective     Awake/alert  Pain/Comfort:  · Pain Rating 1: 0/10  · Pain Rating Post-Intervention 1: 0/10    Objective:     Has the patient been evaluated by SLP for swallowing?   Yes  Keep patient NPO? No   Current Respiratory Status: room air      Pt awake/alert upon entry. She is eager for PO intake and fixated on getting something to drink. SLP explained to pt that she is currently NPO for seizures and SLP can not give her anything until she is cleared by MD for PO intake to resume. Pt completed ongoing assessment of cognitive linguistic aspects. She completed reasoning tasks, sequencing task, and categorization task with 100% accy. She appropriately read short reading and clock on the wall. No further cognitive therapy warranted at this time. SLP will follow up for ongoing assessment of swallow function once pt cleared for PO intake by MD.    Assessment:     Donna Arias is a 60 y.o. female with an SLP diagnosis of Dysphagia.      Goals:   Multidisciplinary Problems     SLP Goals        Problem: SLP Goal    Goal Priority Disciplines Outcome   SLP Goal     SLP Ongoing, Progressing   Description: Speech Language Pathology Goals  Goals expected to be met by 9/21    1. Pt will tolerate regular solids and thin liquid diet with no overt clinical signs of airway compromise.  2. Pt will participate in ongoing assessment of cognitive linguistic aspects.                            Plan:     · Patient to be seen:  4 x/week   · Plan of Care expires:  10/13/20  · Plan of  Care reviewed with:  patient   · SLP Follow-Up:  Yes       Discharge recommendations:  rehabilitation facility       Time Tracking:     SLP Treatment Date:   09/16/20  Speech Start Time:  0818  Speech Stop Time:  0826     Speech Total Time (min):  8 min    Billable Minutes: Speech Therapy Individual 8    Desirae Sanders CCC-SLP  09/16/2020

## 2020-09-16 NOTE — PLAN OF CARE
Continue per OT POC, all goals remain appropriate.    Problem: Occupational Therapy Goal  Goal: Occupational Therapy Goal  Description: Goals to be met by: 9/28/2020     Patient will increase functional independence with ADLs by performing:    Grooming while EOB with Minimal Assistance.  Toileting from bedside commode with Minimal Assistance for hygiene and clothing management.   Sitting at edge of bed x10 minutes with Contact Guard Assistance while engaging in functional tasks   Stand pivot transfers with Minimal Assistance.  Toilet transfer to bedside commode with Minimal Assistance.    Outcome: Ongoing, Progressing

## 2020-09-16 NOTE — SUBJECTIVE & OBJECTIVE
Interval History: No noted clinical seizures overnight, EEG fluctuating, on ictal-interictal continuum. Trough VPA level this morning elevated. Plan to discontinue scheduled VPA for now, continue close EEG monitoring.    Current Facility-Administered Medications   Medication Dose Route Frequency Provider Last Rate Last Dose    0.9%  NaCl infusion   Intravenous Continuous Andres ROSAMARIA Clements NP 75 mL/hr at 09/16/20 1305      acetaminophen suppository 650 mg  650 mg Rectal Q6H PRN Andres PJuan Carlos Clements NP        acetaminophen tablet 650 mg  650 mg Oral Q4H PRN Casandra Noyola NP   650 mg at 09/16/20 0930    acyclovir (ZOVIRAX) 500 mg in dextrose 5 % 100 mL IVPB  10 mg/kg (Ideal) Intravenous Q8H Irwin Akers  mL/hr at 09/16/20 1317 500 mg at 09/16/20 1317    aspirin chewable tablet 81 mg  81 mg Per NG tube Daily Luigi Andrews MD   81 mg at 09/16/20 0811    atorvastatin tablet 40 mg  40 mg Per NG tube Daily Luigi Andrews MD   40 mg at 09/16/20 0812    calcium gluconate 1g in dextrose 5% 100mL (ready to mix system)  1 g Intravenous PRN Gregory Ortega MD        cloBAZam Tab 20 mg  20 mg Per NG tube BID Luigi Andrews MD   20 mg at 09/16/20 0812    docusate 50 mg/5 mL liquid 100 mg  100 mg Per NG tube Daily Luigi Andrews MD        heparin (porcine) injection 5,000 Units  5,000 Units Subcutaneous Q8H Andres PJuan Carlos Clements NP   5,000 Units at 09/16/20 1318    labetalol 20 mg/4 mL (5 mg/mL) IV syring  10 mg Intravenous Q4H PRN Andres PJuan Carlos Clements NP        lacosamide (VIMPAT) 200 mg in sodium chloride 0.9% 100 mL IVPB  200 mg Intravenous Q12H Casandra Noyola  mL/hr at 09/16/20 1229 200 mg at 09/16/20 1229    levETIRAcetam (KEPPRA) 2,000 mg in dextrose 5 % 250 mL IVPB  2,000 mg Intravenous Q12H Luigi Andrews  mL/hr at 09/16/20 0847 2,000 mg at 09/16/20 0847    lorazepam injection 2 mg  2 mg Intravenous Q1H PRN Gregory Ortega MD        magnesium oxide  tablet 800 mg  800 mg Oral PRN Andres P. Andras, NP        magnesium oxide tablet 800 mg  800 mg Oral PRN Andres P. Andras, NP        magnesium sulfate 2g in water 50mL IVPB (premix)  2 g Intravenous PRN Gregory Ortega MD   2 g at 09/15/20 0312    magnesium sulfate 2g in water 50mL IVPB (premix)  4 g Intravenous PRN Gregory Ortega MD        multivitamin tablet 1 tablet  1 tablet Per NG tube Daily Luigi Andrews MD   1 tablet at 09/16/20 0811    ondansetron injection 4 mg  4 mg Intravenous Q6H PRN Andres P. Andras, NP        potassium chloride packet 40 mEq  40 mEq Oral PRN Andres P. Andras, NP        potassium chloride packet 40 mEq  40 mEq Oral PRN Andres P. Andras, NP   40 mEq at 09/16/20 0721    potassium chloride packet 60 mEq  60 mEq Oral PRN Andres P. Andras, NP   60 mEq at 09/15/20 0506    potassium, sodium phosphates 280-160-250 mg packet 2 packet  2 packet Oral PRN Andres P. Andras, NP        potassium, sodium phosphates 280-160-250 mg packet 2 packet  2 packet Oral PRN Andres P. Andras, NP        potassium, sodium phosphates 280-160-250 mg packet 2 packet  2 packet Oral PRN Andres P. Andras, NP        sertraline tablet 25 mg  25 mg Per NG tube Daily Luigi Andrews MD   25 mg at 09/16/20 0811    sodium chloride 0.9% flush 10 mL  10 mL Intravenous PRN Ingrid Hughes MD        sodium chloride 0.9% flush 3 mL  3 mL Intravenous Q8H Andres P. Andras, NP 0 mL/hr at 09/13/20 0924 3 mL at 09/16/20 1318    sodium phosphate 15 mmol in dextrose 5 % 250 mL IVPB  15 mmol Intravenous PRN Gregory Ortega MD        sodium phosphate 20.01 mmol in dextrose 5 % 250 mL IVPB  20.01 mmol Intravenous PRN Gregory Ortega MD        sodium phosphate 30 mmol in dextrose 5 % 250 mL IVPB  30 mmol Intravenous PRN Gregory Ortega MD        valproate (DEPACON) 750 mg in dextrose 5 % 100 mL IVPB  750 mg Intravenous Q8H Luigi Andrews MD   Stopped at 09/16/20 1200     Continuous  Infusions:   sodium chloride 0.9% 75 mL/hr at 09/16/20 1305       Review of Systems   Constitutional: Negative for chills and fever.   Eyes: Negative for photophobia and redness.   Respiratory: Negative for cough and shortness of breath.    Cardiovascular: Negative for chest pain and leg swelling.   Gastrointestinal: Negative for nausea and vomiting.   Musculoskeletal: Positive for neck pain. Negative for neck stiffness.   Skin: Negative for pallor.   Neurological: Positive for seizures. Negative for dizziness, speech difficulty, weakness and headaches.   Psychiatric/Behavioral: Positive for hallucinations. Negative for agitation. The patient is not hyperactive.      Objective:     Vital Signs (Most Recent):  Temp: 98.2 °F (36.8 °C) (09/16/20 1105)  Pulse: 78 (09/16/20 1305)  Resp: (!) 23 (09/16/20 1305)  BP: 133/63 (09/16/20 1305)  SpO2: 99 % (09/16/20 1305) Vital Signs (24h Range):  Temp:  [98 °F (36.7 °C)-98.3 °F (36.8 °C)] 98.2 °F (36.8 °C)  Pulse:  [] 78  Resp:  [15-27] 23  SpO2:  [97 %-100 %] 99 %  BP: ()/(54-72) 133/63     Weight: 74.9 kg (165 lb 2 oz)  Body mass index is 30.2 kg/m².    Physical Exam  Vitals signs and nursing note reviewed.   Constitutional:       Appearance: Normal appearance. She is normal weight.   HENT:      Head: Normocephalic.   Eyes:      Extraocular Movements: Extraocular movements intact.      Conjunctiva/sclera: Conjunctivae normal.      Pupils: Pupils are equal, round, and reactive to light.   Neck:      Musculoskeletal: Neck supple. No neck rigidity.   Cardiovascular:      Pulses: Normal pulses.   Pulmonary:      Effort: Pulmonary effort is normal. No respiratory distress.   Musculoskeletal:         General: No deformity.   Skin:     General: Skin is warm and dry.   Neurological:      Mental Status: She is oriented to person, place, and time.         NEUROLOGICAL EXAMINATION:     MENTAL STATUS   Oriented to person, place, and time.   Level of consciousness: drowsy        Drowsy, arouses to verbal stimuli, follows commands and answers orientation questions     CRANIAL NERVES     CN III, IV, VI   Pupils are equal, round, and reactive to light.    CN VII   Left facial weakness: mild left lower facial weakness.    CN VIII   CN VIII normal.       Significant Labs: All pertinent lab results from the past 24 hours have been reviewed.    Significant Studies: I have reviewed all pertinent imaging results/findings within the past 24 hours.

## 2020-09-16 NOTE — ASSESSMENT & PLAN NOTE
-- Continue Neuro checks q 1hr  -- Epilepsy Service consulted- recommend 2 mg of ativan as needed if Pt has catatonic like seizure.   -- Seizure Precautions  -- Continue vEEG  -- Continue cloBAZam Tab 10 mg, Vimpat 200 mg bid, and Keppra 1000 mg bid  -- concern for encephalitis due to infectious etiology- LP performed 9/13/20. Follow up infectious work-up. Consider acyclovir and antimicrobials if Neuro status does not improve or clinically Pt declines.   9/16: continue AEDs (vimpat , keppra, clobazam, valproate)-appreciate epilepsy reccs

## 2020-09-17 LAB
ABO + RH BLD: NORMAL
ALBUMIN SERPL BCP-MCNC: 3.3 G/DL (ref 3.5–5.2)
ALP SERPL-CCNC: 82 U/L (ref 55–135)
ALT SERPL W/O P-5'-P-CCNC: 17 U/L (ref 10–44)
ANION GAP SERPL CALC-SCNC: 11 MMOL/L (ref 8–16)
AST SERPL-CCNC: 17 U/L (ref 10–40)
BASOPHILS # BLD AUTO: 0.03 K/UL (ref 0–0.2)
BASOPHILS NFR BLD: 0.3 % (ref 0–1.9)
BILIRUB SERPL-MCNC: 0.3 MG/DL (ref 0.1–1)
BLD GP AB SCN CELLS X3 SERPL QL: NORMAL
BUN SERPL-MCNC: 4 MG/DL (ref 6–20)
C GATTII+NEOFOR DNA CSF QL NAA+NON-PROBE: NEGATIVE
CALCIUM SERPL-MCNC: 8.8 MG/DL (ref 8.7–10.5)
CHLORIDE SERPL-SCNC: 104 MMOL/L (ref 95–110)
CMV DNA CSF QL NAA+NON-PROBE: NEGATIVE
CO2 SERPL-SCNC: 27 MMOL/L (ref 23–29)
CREAT SERPL-MCNC: 0.7 MG/DL (ref 0.5–1.4)
DIFFERENTIAL METHOD: ABNORMAL
E COLI K1 DNA CSF QL NAA+NON-PROBE: NEGATIVE
EOSINOPHIL # BLD AUTO: 0.3 K/UL (ref 0–0.5)
EOSINOPHIL NFR BLD: 2.1 % (ref 0–8)
ERYTHROCYTE [DISTWIDTH] IN BLOOD BY AUTOMATED COUNT: 14.6 % (ref 11.5–14.5)
EST. GFR  (AFRICAN AMERICAN): >60 ML/MIN/1.73 M^2
EST. GFR  (NON AFRICAN AMERICAN): >60 ML/MIN/1.73 M^2
EV RNA CSF QL NAA+NON-PROBE: NEGATIVE
GLUCOSE SERPL-MCNC: 87 MG/DL (ref 70–110)
GP B STREP DNA CSF QL NAA+NON-PROBE: NEGATIVE
HAEM INFLU DNA CSF QL NAA+NON-PROBE: NEGATIVE
HCT VFR BLD AUTO: 35.7 % (ref 37–48.5)
HGB BLD-MCNC: 11.4 G/DL (ref 12–16)
HHV6 DNA CSF QL NAA+NON-PROBE: NEGATIVE
HSV1 DNA CSF QL NAA+NON-PROBE: NEGATIVE
HSV1, PCR, CSF: NEGATIVE
HSV2 DNA CSF QL NAA+NON-PROBE: NEGATIVE
HSV2, PCR, CSF: NEGATIVE
IMM GRANULOCYTES # BLD AUTO: 0.03 K/UL (ref 0–0.04)
IMM GRANULOCYTES NFR BLD AUTO: 0.3 % (ref 0–0.5)
INR PPP: 1 (ref 0.8–1.2)
L MONOCYTOG DNA CSF QL NAA+NON-PROBE: NEGATIVE
LYMPHOCYTES # BLD AUTO: 2 K/UL (ref 1–4.8)
LYMPHOCYTES NFR BLD: 16.5 % (ref 18–48)
MAGNESIUM SERPL-MCNC: 2.1 MG/DL (ref 1.6–2.6)
MCH RBC QN AUTO: 29.5 PG (ref 27–31)
MCHC RBC AUTO-ENTMCNC: 31.9 G/DL (ref 32–36)
MCV RBC AUTO: 93 FL (ref 82–98)
MICROBIOLOGIST REVIEW: NORMAL
MONOCYTES # BLD AUTO: 0.6 K/UL (ref 0.3–1)
MONOCYTES NFR BLD: 5.4 % (ref 4–15)
N MEN DNA CSF QL NAA+NON-PROBE: NEGATIVE
NEUTROPHILS # BLD AUTO: 9 K/UL (ref 1.8–7.7)
NEUTROPHILS NFR BLD: 75.4 % (ref 38–73)
NRBC BLD-RTO: 0 /100 WBC
PARECHOVIRUS A RNA CSF QL NAA+NON-PROBE: NEGATIVE
PHOSPHATE SERPL-MCNC: 4.4 MG/DL (ref 2.7–4.5)
PLATELET # BLD AUTO: 351 K/UL (ref 150–350)
PMV BLD AUTO: 9.1 FL (ref 9.2–12.9)
POCT GLUCOSE: 102 MG/DL (ref 70–110)
POCT GLUCOSE: 119 MG/DL (ref 70–110)
POCT GLUCOSE: 90 MG/DL (ref 70–110)
POTASSIUM SERPL-SCNC: 3.8 MMOL/L (ref 3.5–5.1)
POTASSIUM SERPL-SCNC: 4.2 MMOL/L (ref 3.5–5.1)
PROT SERPL-MCNC: 6.2 G/DL (ref 6–8.4)
PROTHROMBIN TIME: 11.2 SEC (ref 9–12.5)
RBC # BLD AUTO: 3.86 M/UL (ref 4–5.4)
S PNEUM DNA CSF QL NAA+NON-PROBE: NEGATIVE
SODIUM SERPL-SCNC: 142 MMOL/L (ref 136–145)
VZV DNA CSF QL NAA+NON-PROBE: NEGATIVE
WBC # BLD AUTO: 11.91 K/UL (ref 3.9–12.7)

## 2020-09-17 PROCEDURE — 80053 COMPREHEN METABOLIC PANEL: CPT

## 2020-09-17 PROCEDURE — 63600175 PHARM REV CODE 636 W HCPCS: Performed by: NURSE PRACTITIONER

## 2020-09-17 PROCEDURE — 97530 THERAPEUTIC ACTIVITIES: CPT

## 2020-09-17 PROCEDURE — 99233 PR SUBSEQUENT HOSPITAL CARE,LEVL III: ICD-10-PCS | Mod: ,,, | Performed by: NURSE PRACTITIONER

## 2020-09-17 PROCEDURE — 99233 PR SUBSEQUENT HOSPITAL CARE,LEVL III: ICD-10-PCS | Mod: ,,, | Performed by: PSYCHIATRY & NEUROLOGY

## 2020-09-17 PROCEDURE — 63600175 PHARM REV CODE 636 W HCPCS: Performed by: PSYCHIATRY & NEUROLOGY

## 2020-09-17 PROCEDURE — 84132 ASSAY OF SERUM POTASSIUM: CPT

## 2020-09-17 PROCEDURE — 83735 ASSAY OF MAGNESIUM: CPT

## 2020-09-17 PROCEDURE — 85025 COMPLETE CBC W/AUTO DIFF WBC: CPT

## 2020-09-17 PROCEDURE — 25000003 PHARM REV CODE 250: Performed by: NURSE PRACTITIONER

## 2020-09-17 PROCEDURE — 97112 NEUROMUSCULAR REEDUCATION: CPT

## 2020-09-17 PROCEDURE — 25000003 PHARM REV CODE 250: Performed by: STUDENT IN AN ORGANIZED HEALTH CARE EDUCATION/TRAINING PROGRAM

## 2020-09-17 PROCEDURE — 97803 MED NUTRITION INDIV SUBSEQ: CPT

## 2020-09-17 PROCEDURE — 84100 ASSAY OF PHOSPHORUS: CPT

## 2020-09-17 PROCEDURE — 25000003 PHARM REV CODE 250: Performed by: PSYCHIATRY & NEUROLOGY

## 2020-09-17 PROCEDURE — 92526 ORAL FUNCTION THERAPY: CPT

## 2020-09-17 PROCEDURE — A4216 STERILE WATER/SALINE, 10 ML: HCPCS | Performed by: NURSE PRACTITIONER

## 2020-09-17 PROCEDURE — 94761 N-INVAS EAR/PLS OXIMETRY MLT: CPT

## 2020-09-17 PROCEDURE — 99233 SBSQ HOSP IP/OBS HIGH 50: CPT | Mod: ,,, | Performed by: PSYCHIATRY & NEUROLOGY

## 2020-09-17 PROCEDURE — 97535 SELF CARE MNGMENT TRAINING: CPT

## 2020-09-17 PROCEDURE — C9254 INJECTION, LACOSAMIDE: HCPCS | Performed by: NURSE PRACTITIONER

## 2020-09-17 PROCEDURE — 63600175 PHARM REV CODE 636 W HCPCS: Performed by: STUDENT IN AN ORGANIZED HEALTH CARE EDUCATION/TRAINING PROGRAM

## 2020-09-17 PROCEDURE — 86850 RBC ANTIBODY SCREEN: CPT

## 2020-09-17 PROCEDURE — 99233 SBSQ HOSP IP/OBS HIGH 50: CPT | Mod: ,,, | Performed by: NURSE PRACTITIONER

## 2020-09-17 PROCEDURE — 85610 PROTHROMBIN TIME: CPT

## 2020-09-17 PROCEDURE — 20000000 HC ICU ROOM

## 2020-09-17 PROCEDURE — 25000003 PHARM REV CODE 250: Performed by: ANESTHESIOLOGY

## 2020-09-17 RX ORDER — SILODOSIN 4 MG/1
4 CAPSULE ORAL DAILY
Status: DISCONTINUED | OUTPATIENT
Start: 2020-09-17 | End: 2020-09-18

## 2020-09-17 RX ORDER — SIMETHICONE 80 MG
1 TABLET,CHEWABLE ORAL 3 TIMES DAILY PRN
Status: DISCONTINUED | OUTPATIENT
Start: 2020-09-17 | End: 2020-09-24 | Stop reason: HOSPADM

## 2020-09-17 RX ADMIN — HEPARIN SODIUM 5000 UNITS: 5000 INJECTION INTRAVENOUS; SUBCUTANEOUS at 05:09

## 2020-09-17 RX ADMIN — ACYCLOVIR SODIUM 500 MG: 1000 INJECTION, SOLUTION INTRAVENOUS at 05:09

## 2020-09-17 RX ADMIN — HEPARIN SODIUM 5000 UNITS: 5000 INJECTION INTRAVENOUS; SUBCUTANEOUS at 02:09

## 2020-09-17 RX ADMIN — Medication 3 ML: at 10:09

## 2020-09-17 RX ADMIN — THERA TABS 1 TABLET: TAB at 09:09

## 2020-09-17 RX ADMIN — LEVETIRACETAM 2000 MG: 100 INJECTION, SOLUTION, CONCENTRATE INTRAVENOUS at 09:09

## 2020-09-17 RX ADMIN — SERTRALINE HYDROCHLORIDE 25 MG: 25 TABLET ORAL at 09:09

## 2020-09-17 RX ADMIN — ATORVASTATIN CALCIUM 40 MG: 20 TABLET, FILM COATED ORAL at 09:09

## 2020-09-17 RX ADMIN — ASPIRIN 81 MG CHEWABLE TABLET 81 MG: 81 TABLET CHEWABLE at 09:09

## 2020-09-17 RX ADMIN — CLOBAZAM 20 MG: 10 TABLET ORAL at 09:09

## 2020-09-17 RX ADMIN — Medication 3 ML: at 02:09

## 2020-09-17 RX ADMIN — SODIUM CHLORIDE 200 MG: 0.9 INJECTION, SOLUTION INTRAVENOUS at 12:09

## 2020-09-17 RX ADMIN — ACYCLOVIR SODIUM 500 MG: 1000 INJECTION, SOLUTION INTRAVENOUS at 02:09

## 2020-09-17 RX ADMIN — SILODOSIN 4 MG: 4 CAPSULE ORAL at 09:09

## 2020-09-17 RX ADMIN — ACYCLOVIR SODIUM 500 MG: 1000 INJECTION, SOLUTION INTRAVENOUS at 09:09

## 2020-09-17 RX ADMIN — SODIUM CHLORIDE 200 MG: 0.9 INJECTION, SOLUTION INTRAVENOUS at 11:09

## 2020-09-17 RX ADMIN — HEPARIN SODIUM 5000 UNITS: 5000 INJECTION INTRAVENOUS; SUBCUTANEOUS at 09:09

## 2020-09-17 RX ADMIN — POTASSIUM CHLORIDE 40 MEQ: 1.5 POWDER, FOR SOLUTION ORAL at 05:09

## 2020-09-17 NOTE — PROGRESS NOTES
0730- Followed up with AMADEO Weldon about pt's continued abdominal pain/hardneing as well as patient no longer being able to void spontaneously. NP gave verbal ok to continue bladder scanning/straight cathing as needed. WCEMERADL.

## 2020-09-17 NOTE — PROGRESS NOTES
0045- RUCHI Elizalde was notified that patient is complaining of abdominal pain. Upon assessment patient's abdomen is tender and hard to touch. PA gave verbal orders to obtain a KUB. Will continue to monitor.

## 2020-09-17 NOTE — PLAN OF CARE
POC reviewed with pt at 1400. Pt verbalized understanding. Questions and concerns addressed. No acute events today. Pt progressing toward goals. Will continue to monitor. See flowsheets for full assessment and VS info.     EEG discontinued today.  MRI completed today. Tolerated well.

## 2020-09-17 NOTE — PLAN OF CARE
POC reviewed with pt at 0500. Pt verbalized understanding but needs reinforcement teaching. Questions and concerns addressed. CEEG remains in place. KUB obtained d/t complaints of abdominal pain (see progress notes for details). No significant findings. Potassium replaced per order set. Pt progressing toward goals. Will continue to monitor. See flowsheets for full assessment and VS info          Problem: Adult Inpatient Plan of Care  Goal: Plan of Care Review  Outcome: Ongoing, Progressing  Flowsheets (Taken 9/17/2020 0433)  Plan of Care Reviewed With: patient     Problem: Seizure, Active Management  Goal: Absence of Seizure/Seizure-Related Injury  9/17/2020 0442 by Lucille Pride RN  Outcome: Ongoing, Progressing  Intervention: Prevent Seizure-Related Injury  9/17/2020 0442 by Lucille Pride RN  Flowsheets (Taken 9/17/2020 0442)  Seizure Precautions:   activity supervised   clutter-free environment maintained   emergency equipment at bedside  9/17/2020 0436 by Lucille Pride RN  Flowsheets (Taken 9/17/2020 0433)  Seizure Precautions:   activity supervised   clutter-free environment maintained   emergency equipment at bedside     Problem: Restraint, Nonbehavioral (Nonviolent)  Goal: Discontinuation Criteria Achieved  Outcome: Ongoing, Progressing  Intervention: Implement Least-restrictive Safety Strategies  Flowsheets (Taken 9/17/2020 0433)  Medical Device Protection:   IV pole/bag removed from visual field   tubing secured  Less Restrictive Alternatives:   1:1 observation maintained   bed alarm in use   calming techniques promoted   positive reinforcement provided   safety enhancements provided  Diversional Activities: television

## 2020-09-17 NOTE — PT/OT/SLP PROGRESS
"Speech Language Pathology Treatment/  Discharge Summary    Patient Name:  Donna Arias   MRN:  [id number]   [id number]    Admitting Diagnosis: Seizure disorder    Recommendations:                 General Recommendations:  Follow-up not indicated  Diet recommendations:  Regular, Liquid Diet Level: Thin   Aspiration Precautions: Standard aspiration precautions   General Precautions: Standard, fall, seizure  Communication strategies:  go to room if call light pushed    Subjective     "I want a diet sprite and chocolate ice cream."     Pain/Comfort:  · Pain Rating 1: 8/10  · Location - Orientation 1: generalized  · Pain Addressed 1: Distraction  · Pain Rating Post-Intervention 1: 8/10    Objective:     Has the patient been evaluated by SLP for swallowing?   Yes  Keep patient NPO? No   Current Respiratory Status: room air      Pt asleep upon entry. Easily awakened with gentle verbal stimulation. HOB raised. Pt observed with multiple straw sips thin water taken in isolation and as regular consistency solid liquid wash and PO trial regular consistency solid dayton cracker x1 via multiple bites. Oral phase appeared WFL and overt clinical signs aspiration unappreciated. Education provided re: ongoing strict and consistent implementation of all aspiration precautions and updated SLP POC. Encouragement provided to request SLP re-consult should she experience swallowing changes. Pt verbalized understanding of education provided and agreement with SLP POC. No further questions.     Results discussed with NSG who verbalized understanding of information provided.     Assessment:     Donna Arias is a 60 y.o. female without additional acute SLP needs at this time. Please re-consult should changes occur.     Goals:   Multidisciplinary Problems     SLP Goals     Not on file          Multidisciplinary Problems (Resolved)        Problem: SLP Goal    Goal Priority Disciplines Outcome   SLP Goal   (Resolved)     SLP Met   Description: " Speech Language Pathology Goals  Goals expected to be met by 9/21    1. Pt will tolerate regular solids and thin liquid diet with no overt clinical signs of airway compromise.  2. Pt will participate in ongoing assessment of cognitive linguistic aspects.                            Plan:     · Plan of Care reviewed with:  patient   · SLP Follow-Up:  No       Discharge recommendations:  rehabilitation facility     Time Tracking:     SLP Treatment Date:   09/17/20  Speech Start Time:  1044  Speech Stop Time:  1100     Speech Total Time (min):  16 min    Billable Minutes: Treatment Swallowing Dysfunction 8 and Seld Care/Home Management Training 8    ANABELA Chavira, CCC-SLP  [phone]  9/17/2020

## 2020-09-17 NOTE — PLAN OF CARE
Recommendations    Recommendation:   1. Continue CL diet, ADAT to regular texture per SLP    - Add boost breeze to increase intake on CL diet   2. If TF warranted, suggest restarting Isosource @ 10 mL/hr increase to goal rate of 45 mL/hr to provide pt with 1620 kcal, 73 g protein and 825 mL free water.    Additional water per MD. Hold for residuals >500 mL.  RD to monitor and follow up    Goals: Meet % EEN, EPN by RD f/u date  Nutrition Goal Status: progressing towards goal, goal not met  Communication of RD Recs: reviewed with RN

## 2020-09-17 NOTE — ASSESSMENT & PLAN NOTE
61 yo female with no known history of seizures transferred from [facility] for management of recurrent episodes concerning for seizure. Started on Vimpat, Keppra, Onfi at OSH. EEG initiated 9/13>9/14 showing recurrent electroclinical seizures with clinical association of prominent left head/eye version.    Recommendations:   - Continue vEEG   - MRI brain pending per NCC to re-evaluate right frontal abnormality in setting of clinical improvement  - Continue Onfi 20 mg BID, Vimpat 200 mg BID, Keppra 2000 mg BID  - Continue to hold VPA, plan to give additional doses if EEG more active with more discrete seizures  - LP completed 9/13, Protein 167, further CSF results pending. On empiric Acyclovir  - Management of acute infectious/metabolic abnormalities per NCC    Plan of care discussed with NCC team, patient at bedside. Will continue to follow.

## 2020-09-17 NOTE — ASSESSMENT & PLAN NOTE
-- Continue Neuro checks q 1hr  -- Vascular Neurology following  -- MRI (9/12/20) - changes involving the right frontal lobe increased signal, cortical banding which can be seen with seizure activity. Given presentation stroke less likely.   -- SBP goal <220  -- PT/OT/Speech  -- ASA 81 mg daily      Atorvastatin 40 mg daily     SQH 5000 units q8h  9/16- Very unlikely causes by cerebral infarct or ischemia. More concern of autoimmune etiology causing seizures per epilepsy, appreciate reccs.

## 2020-09-17 NOTE — CONSULTS
Wound consult received on patient's sacrum. Light purple/light brown discoloration noted to upper sacrum, discoloration appears to be more bruising/ecchymosis than pressure related. Sacral foam dressing being utilized per nursing staff, recommend continuing sacral foam prevention.  Two red lesions noted, appear to be resolving to buttocks. Patient reports takes acyclovir for lesions to buttocks when in a flare.  Patient currently in neuro icu on low airloss surface. Nursing to continue care. Wound care to follow prn.      sacrum

## 2020-09-17 NOTE — PROGRESS NOTES
[facility]  Neurology-Epilepsy  Progress Note    Patient Name: Donna Arias  MRN: [id number]  Admission Date: 9/11/2020  Hospital Length of Stay: 6 days  Code Status: Full Code   Attending Provider: Balaji Kathleen MD  Primary Care Physician: Casey Richmond MD   Principal Problem:Seizure disorder    Subjective:     Hospital Course:   9/13>9/14: recurrent electroclinical seizures characterized by evolution of sharply contoured slowing over the right anterior quadrant into rhythmic delta -> theta activity with superimposed sharp/fast activity which abruptly ceases. Clinically, this activity is associated with prominent left head/eye version  9/14>9/15: Recurrent seizures throughout the day 9/14, anticonvulsant medications escalated. No seizures from approximately 18:24 on 9/14 - 0627 on 9/15 after 2 mg Ativan, Vimpat 400 mg x1. Four electroclinical seizures noted throughout the morning 9/15 (from 2131-2468), however overall frequency appears to be improving.  9/15>9/16: Study fluctuating, ictal-interictal continuum with less frequent discrete seizures. Continue current AED regimen.  9/16>9/17: Fluctuating, at times lateralized periodic discharges with superimposed fast activity. Clinically, patient much improved, alert, oriented and participative in exam. MRI brain pending for further evaluation of prior lesion.    Interval History: No acute events overnight, clinically significantly improved today. More alert, interactive and participates in conversation and exam. Continue current AED regimen.    Current Facility-Administered Medications   Medication Dose Route Frequency Provider Last Rate Last Dose    0.9%  NaCl infusion   Intravenous Continuous Andres Clements NP 75 mL/hr at 09/17/20 1200      acetaminophen suppository 650 mg  650 mg Rectal Q6H PRN Andressharon Clements NP        acetaminophen tablet 650 mg  650 mg Per NG tube Q4H PRN Balaji Kathleen MD   650 mg at 09/16/20 1928    acyclovir  (ZOVIRAX) 500 mg in dextrose 5 % 100 mL IVPB  10 mg/kg (Ideal) Intravenous Q8H Irwin Akers  mL/hr at 09/17/20 0538 500 mg at 09/17/20 0538    aspirin chewable tablet 81 mg  81 mg Per NG tube Daily Luigi Andrews MD   81 mg at 09/17/20 0931    atorvastatin tablet 40 mg  40 mg Per NG tube Daily Luigi Andrews MD   40 mg at 09/17/20 0932    calcium gluconate 1g in dextrose 5% 100mL (ready to mix system)  1 g Intravenous PRN Gregory Ortega MD        cloBAZam Tab 20 mg  20 mg Per NG tube BID Luigi Andrews MD   20 mg at 09/17/20 0931    docusate 50 mg/5 mL liquid 100 mg  100 mg Per NG tube Daily Luigi Andrews MD        heparin (porcine) injection 5,000 Units  5,000 Units Subcutaneous Q8H Andres Clements NP   5,000 Units at 09/17/20 0538    labetalol 20 mg/4 mL (5 mg/mL) IV syring  10 mg Intravenous Q4H PRN Andres Clements NP        lacosamide (VIMPAT) 200 mg in sodium chloride 0.9% 100 mL IVPB  200 mg Intravenous Q12H Casandra Noyola  mL/hr at 09/17/20 1147 200 mg at 09/17/20 1147    levETIRAcetam (KEPPRA) 2,000 mg in dextrose 5 % 250 mL IVPB  2,000 mg Intravenous Q12H Luigi Andrews  mL/hr at 09/17/20 0932 2,000 mg at 09/17/20 0932    lorazepam injection 2 mg  2 mg Intravenous Q1H PRN Gregory Ortega MD        magnesium oxide tablet 800 mg  800 mg Per NG tube PRN Balaji Kathleen MD        magnesium oxide tablet 800 mg  800 mg Per NG tube PRN Balaji Kathleen MD        magnesium sulfate 2g in water 50mL IVPB (premix)  2 g Intravenous PRN Gregory Ortega MD   2 g at 09/15/20 0312    magnesium sulfate 2g in water 50mL IVPB (premix)  4 g Intravenous PRN Gregory Ortega MD        multivitamin tablet 1 tablet  1 tablet Per NG tube Daily Luigi Andrews MD   1 tablet at 09/17/20 0942    ondansetron injection 4 mg  4 mg Intravenous Q6H PRN Andres Clements NP   4 mg at 09/16/20 1928    potassium chloride packet 40 mEq   40 mEq Per NG tube PRN Balaji Kathleen MD   40 mEq at 09/17/20 0538    potassium chloride packet 40 mEq  40 mEq Per NG tube PRN Balaji Kathleen MD        potassium chloride packet 60 mEq  60 mEq Per NG tube PRN Balaji Kathleen MD        potassium, sodium phosphates 280-160-250 mg packet 2 packet  2 packet Per NG tube PRN Balaji Kathleen MD        potassium, sodium phosphates 280-160-250 mg packet 2 packet  2 packet Per NG tube PRN Balaji Kathleen MD        potassium, sodium phosphates 280-160-250 mg packet 2 packet  2 packet Per NG tube PRN Balaji Kathleen MD        sertraline tablet 25 mg  25 mg Per NG tube Daily Luigi Andrews MD   25 mg at 09/17/20 0942    silodosin capsule 4 mg  4 mg Per NG tube Daily Nancy Coelho NP   4 mg at 09/17/20 0942    simethicone chewable tablet 80 mg  1 tablet Per NG tube TID PRN Cyn Elizalde PA-C        sodium chloride 0.9% flush 10 mL  10 mL Intravenous PRN Ingrid Hughes MD        sodium chloride 0.9% flush 3 mL  3 mL Intravenous Q8H Andres Clements NP 0 mL/hr at 09/13/20 0924 3 mL at 09/16/20 2200    sodium phosphate 15 mmol in dextrose 5 % 250 mL IVPB  15 mmol Intravenous PRN Gregory Ortega MD        sodium phosphate 20.01 mmol in dextrose 5 % 250 mL IVPB  20.01 mmol Intravenous PRN Gregory Ortega MD        sodium phosphate 30 mmol in dextrose 5 % 250 mL IVPB  30 mmol Intravenous PRN Gregory Ortega MD         Continuous Infusions:   sodium chloride 0.9% 75 mL/hr at 09/17/20 1200       Review of Systems   Constitutional: Negative for chills and fever.   Eyes: Negative for photophobia and redness.   Respiratory: Negative for cough and shortness of breath.    Cardiovascular: Negative for chest pain and leg swelling.   Gastrointestinal: Negative for nausea and vomiting.   Musculoskeletal: Positive for neck pain. Negative for neck stiffness.   Skin: Negative for pallor.   Neurological: Positive for seizures. Negative for  dizziness, speech difficulty, weakness and headaches.   Psychiatric/Behavioral: Positive for hallucinations. Negative for agitation. The patient is not hyperactive.      Objective:     Vital Signs (Most Recent):  Temp: 98.8 °F (37.1 °C) (09/17/20 1100)  Pulse: 98 (09/17/20 1200)  Resp: (!) 23 (09/17/20 1200)  BP: 132/63 (09/17/20 1200)  SpO2: 99 % (09/17/20 1200) Vital Signs (24h Range):  Temp:  [98 °F (36.7 °C)-98.8 °F (37.1 °C)] 98.8 °F (37.1 °C)  Pulse:  [] 98  Resp:  [17-34] 23  SpO2:  [94 %-100 %] 99 %  BP: (115-166)/() 132/63     Weight: 74.9 kg (165 lb 2 oz)  Body mass index is 30.2 kg/m².    Physical Exam  Vitals signs and nursing note reviewed.   Constitutional:       Appearance: Normal appearance. She is normal weight.   HENT:      Head: Normocephalic.   Eyes:      Extraocular Movements: Extraocular movements intact.      Conjunctiva/sclera: Conjunctivae normal.      Pupils: Pupils are equal, round, and reactive to light.   Neck:      Musculoskeletal: Normal range of motion and neck supple. No neck rigidity.   Cardiovascular:      Pulses: Normal pulses.   Pulmonary:      Effort: Pulmonary effort is normal. No respiratory distress.   Musculoskeletal:         General: No swelling or deformity.   Skin:     General: Skin is warm and dry.   Neurological:      Mental Status: She is oriented to person, place, and time.   Psychiatric:         Speech: Speech normal.         NEUROLOGICAL EXAMINATION:     MENTAL STATUS   Oriented to person, place, and time.   Speech: speech is normal   Level of consciousness: alert       Alert, oriented to person, place, date. Interactive, participates in exam.     CRANIAL NERVES     CN III, IV, VI   Pupils are equal, round, and reactive to light.    CN VII   Facial expression full, symmetric.   Left facial weakness: mild left lower facial weakness.    CN VIII   CN VIII normal.     CN XI   CN XI normal.     MOTOR EXAM   Muscle bulk: normal  Overall muscle tone: normal      "  Moves all extremities spontaneously and symmetrically, follows commands        Significant Labs: All pertinent lab results from the past 24 hours have been reviewed.    Significant Studies: I have reviewed all pertinent imaging results/findings within the past 24 hours.    Assessment and Plan:     * Seizure disorder  59 yo female with no known history of seizures transferred from [facility] for management of recurrent episodes concerning for seizure. Started on Vimpat, Keppra, Onfi at OSH. EEG initiated 9/13>9/14 showing recurrent electroclinical seizures with clinical association of prominent left head/eye version.    Recommendations:   - Continue vEEG   - MRI brain pending per [facility] to re-evaluate right frontal abnormality in setting of clinical improvement  - Continue Onfi 20 mg BID, Vimpat 200 mg BID, Keppra 2000 mg BID  - Continue to hold VPA, plan to give additional doses if EEG more active with more discrete seizures  - LP completed 9/13, Protein 167, further CSF results pending. On empiric Acyclovir  - Management of acute infectious/metabolic abnormalities per [facility]    Plan of care discussed with NCC team, patient at bedside. Will continue to follow.    Hallucinations  - Patient reports intermittent hallucinations prior to current hospitalization, "seeing a person in the next room"  - Psychiatry following  - Follow up CSF results, infectious workup. No further noted hallucinations, possibly related to seizures    Abnormal CT of brain  - MRI brain 9/12/20 showed restricted diffusion and edema throughout the right frontal lobe with sulcal effacement.  Findings concerning for acute infarction in the right CESILIA territory  - Case discussed in Neuroradiology conference 9/16, thought most consistent with encephalitis or postictal changes. No evidence of acute stroke. ? Underlying inflammatory changes, autoimmune/paraneoplastic process    Cervical disc disease  - Reports chronic neck pain         VTE Risk " Mitigation (From admission, onward)         Ordered     heparin (porcine) injection 5,000 Units  Every 8 hours      09/12/20 1930     IP VTE LOW RISK PATIENT  Once      09/11/20 1915     Place sequential compression device  Until discontinued      09/11/20 1716                Lucille Ramos PA-C  Neurology-Epilepsy  [facility]  Staff: Dr. Funes

## 2020-09-17 NOTE — ASSESSMENT & PLAN NOTE
- MRI brain 9/12/20 showed restricted diffusion and edema throughout the right frontal lobe with sulcal effacement.  Findings concerning for acute infarction in the right CESILIA territory  - Case discussed in Neuroradiology conference 9/16, thought most consistent with encephalitis or postictal changes. No evidence of acute stroke. ? Underlying inflammatory changes, autoimmune/paraneoplastic process

## 2020-09-17 NOTE — PLAN OF CARE
Patient not medically ready for discharge today.    SW sent rehab referrals.           09/17/20 6092   Discharge Reassessment   Assessment Type Discharge Planning Reassessment   Provided patient/caregiver education on the expected discharge date and the discharge plan Yes   Do you have any problems affording any of your prescribed medications? No   Discharge Plan A Rehab   Discharge Plan B Rehab   DME Needed Upon Discharge  other (see comments)  (tbd)   Anticipated Discharge Disposition Rehab   Can the patient/caregiver answer the patient profile reliably? Yes, cognitively intact   Describe the patient's ability to walk at the present time. Major restrictions/daily assistance from another person   How often would a person be available to care for the patient? Occasionally   Number of comorbid conditions (as recorded on the chart) Three     Ivory Khan RN, CCRN-K, [facility]  Neuro-Critical Care   [phone] [id number]

## 2020-09-17 NOTE — PROCEDURES
[facility] EEG/VIDEO MONITORING REPORT  Donna Arias  [id number]  1960    DATE OF SERVICE:  09/16/2020 - 09/17/2020  DATE OF ADMISSION: 9/11/2020  8:50 AM    ADMITTING/REQUESTING PROVIDER: Irwin Akers MD    REASON FOR CONSULT:  60-year-old woman with right frontal quadrant diffusion/FLAIR changes on MRI and recurrent episodes of forced left head and eye version.  Evaluate for evidence of epileptiform activity.    METHODOLOGY   Electroencephalographic (EEG) recording is with electrodes placed according to the International 10-20 placement system.  Thirty two (32) channels of digital signal (sampling rate of 512/sec) including T1 and T2 was simultaneously recorded from the scalp and may include  EKG, EMG, and/or eye monitors.  Recording band pass was 0.1 to 512 hz.  Digital video recording of the patient is simultaneously recorded with the EEG.  The patient is instructed report clinical symptoms which may occur during the recording session.  EEG and video recording is stored and archived in digital format.  Activation procedures which include photic stimulation, hyperventilation and instructing patients to perform simple task are done in selected patients.   The EEG is displayed on a monitor screen and can be reviewed using different montages.  Computer assisted analysis is employed to detect spike and electrographic seizure activity.   The entire record is submitted for computer analysis.  The entire recording is visually reviewed and the times identified by computer analysis as being spikes or seizures are reviewed again.  Compresses spectral analysis (CSA) is also performed on the activity recorded from each individual channel.  This is displayed as a power display of frequencies from 0 to 30 Hz over time.   The CSA is reviewed looking for asymmetries in power between homologous areas of the scalp and then compared with the original EEG recording.     Salsify software is also utilized in the review of this  study.  This software suite analyzes the EEG recording in multiple domains.  Coherence and rhythmicity is computed to identify EEG sections which may contain organized seizures.  Each channel undergoes analysis to detect presence of spike and sharp waves which have special and morphological characteristic of epileptic activity.  The routine EEG recording is converted from spacial into frequency domain.  This is then displayed comparing homologous areas to identify areas of significant asymmetry.  Algorithm to identify non-cortically generated artifact is used to separate eye movement, EMG and other artifact from the EEG.      RECORDING TIMES  Start on 09/16/2020 at 07:00 a.m.  Stop on 09/17/2020 at 11:48 a.m.-> End of the Recording Session  A total of 27 hr and 53 min of EEG recording is obtained.    EEG FINDINGS  Background activity:   The background is mixed alpha/theta activity with plenty of overriding beta frequencies seen diffusely.  There are periods with prolonged admixed generalized delta activity.  The activity over the right frontal quadrant fluctuates in amplitude, sharpness, degree of superimposed fast features, and rhythmicity.  At times, there are runs of right frontal lateralized periodic discharges at 1 hz alternating with more rhythmic patterns with superimposed sharp/fast features.  Intermittently, there is evolution of the frequency/morphology of the right frontal quadrant activity meeting criteria for subclinical seizure activity.  However, the patient lies quietly during these episodes with no abnormal vocalizations or behaviors.  The overall burden of right anterior quadrant rhythmic/fast/sharp activity becomes somewhat less prominent as the study progresses.    There are no pushbutton activations.    Sleep:  There are sleep spindles and vertex waves.    Activation procedures:   Hyperventilation is not performed  Photic stimulation is not performed    Cardiac Monitor:   Heart rate appears  generally regular on a single lead EKG.    Impression:   This is an abnormal continuous EEG monitoring study because of the fluctuation of epileptiform activity over the right frontal quadrant often times with evolution meeting criteria for subclinical seizures consistent with the ictal/interictal continuum.  Overriding fast activity is often seen as a medication effect.  As the study progresses, the overall burden of epileptiform features over the right frontal quadrant becomes somewhat less prominent and less organized which is an overall modest improvement.    LTM Summary 09/13/2020 - 09/17/2020:  Patient events/Seizures:   recurrent electroclinical seizures characterized by evolution of sharply contoured slowing over the right anterior quadrant into rhythmic delta -> theta activity with superimposed sharp/fast activity which generalizes then abruptly ceases.  Clinically, this activity is associated with prominent left head/eye version.  Slight decrease frequency and duration of seizures after loading valproic acid.  Prolonged, but not sustained, interruption in seizures after an additional load of 400 mg lacosamide in 2 mg lorazepam.  After this, progressively less prominent and less organized fluctuating epileptiform activity over the right frontal quadrant.  Interictal findings:  long runs of sharply contoured slowing over the right anterior frontal region often with embedded spike and wave discharges at approximately 1 hz or fluctuating rhythmic/fast/sharp activity.  Other notable abnormalities:  disorganized asynchronous slowing, right>left; increasing amounts of overriding beta activity as the study progresses (presumably from clobazam)    Elizabeth Funes MD PhD  Neurology-Epilepsy  [facility].  [facility]

## 2020-09-17 NOTE — ASSESSMENT & PLAN NOTE
"- Patient reports intermittent hallucinations prior to current hospitalization, "seeing a person in the next room"  - Psychiatry following  - Follow up CSF results, infectious workup. No further noted hallucinations, possibly related to seizures  "

## 2020-09-17 NOTE — PT/OT/SLP PROGRESS
Occupational Therapy   Treatment    Name: Donna Arias  MRN: [id number]  Admitting Diagnosis:  Seizure disorder       Recommendations:     Discharge Recommendations: rehabilitation facility  Discharge Equipment Recommendations:  none  Barriers to discharge:  Decreased caregiver support, Other (Comment)(Level of skilled assistance required at this time)    Assessment:     Donna Arias is a 60 y.o. female with a medical diagnosis of Seizure disorder.  She presents with the following performance deficits affecting function are weakness, impaired endurance, impaired self care skills, impaired functional mobilty, gait instability, impaired balance, impaired cognition, decreased upper extremity function, decreased lower extremity function, pain, decreased safety awareness, impaired coordination, impaired fine motor.     Rehab Prognosis:  Good; patient would benefit from acute skilled OT services to address these deficits and reach maximum level of function.       Plan:     Patient to be seen 4 x/week to address the above listed problems via self-care/home management, therapeutic activities, therapeutic exercises, cognitive retraining  · Plan of Care Expires: 10/13/20  · Plan of Care Reviewed with: patient    Subjective     Pain/Comfort:  ·  No complaint of pain    Objective:     Communicated with: Nursing prior to session.  Patient found supine with bed alarm, blood pressure cuff, EEG, PureWick, peripheral IV, pulse ox (continuous), telemetry, pressure relief boots, SCD upon OT entry to room.    General Precautions: Standard, fall, seizure   Orthopedic Precautions:N/A   Braces:       Occupational Performance:     Bed Mobility:    · Patient completed Rolling/Turning to Left with  contact guard assistance  · Patient completed Scooting/Bridging with contact guard assistance  · Patient completed Supine to Sit with minimum assistance  · Patient completed Sit to Supine with moderate assistance     Functional  Mobility/Transfers:  · Patient completed Sit <> Stand Transfer with minimum assistance  with  no assistive device   · Functional Mobility: Minimum assistance with no assistive device    Activities of Daily Living:  · Grooming: contact guard assistance seated edge of bed  · Upper Body Dressing: contact guard assistance seated edge of bed  · Lower Body Dressing: moderate assistance seated edge of bed    AMPA 6 Click ADL:      Treatment & Education:  -Patient and family educated on roles/goals of OT and POC.  -White board updated.  -Therapist provided time for questions and answered within scope of practice.  -Patient educated on importance of EOB/OOB activity to maximize recovery.    Patient left supine with all lines intact, call button in reach and nursing notifiedEducation:   restraints reapplied at end of session    GOALS:   Multidisciplinary Problems     Occupational Therapy Goals        Problem: Occupational Therapy Goal    Goal Priority Disciplines Outcome Interventions   Occupational Therapy Goal     OT, PT/OT Ongoing, Progressing    Description: Goals to be met by: 9/28/2020     Patient will increase functional independence with ADLs by performing:    Grooming while EOB with Minimal Assistance.  Toileting from bedside commode with Minimal Assistance for hygiene and clothing management.   Sitting at edge of bed x10 minutes with Contact Guard Assistance while engaging in functional tasks   Stand pivot transfers with Minimal Assistance.  Toilet transfer to bedside commode with Minimal Assistance.                     Time Tracking:     OT Date of Treatment: 09/16/20  OT Start Time: 1140  OT Stop Time: 1205  OT Total Time (min): 25 min    Billable Minutes:Therapeutic Activity 25    Misty Hearn OT  9/17/2020

## 2020-09-17 NOTE — PLAN OF CARE
Recommend regular consistency solid diet and thin liquids. No additional acute SLP needs at this time. Please re-consult should changes occur. Thank you.     Problem: SLP Goal  Goal: SLP Goal  Description: Speech Language Pathology Goals  Goals expected to be met by 9/21    1. Pt will tolerate regular solids and thin liquid diet with no overt clinical signs of airway compromise.  2. Pt will participate in ongoing assessment of cognitive linguistic aspects.     Outcome: Met     ANABELA Chavira, CCC-SLP  [phone]  9/17/2020

## 2020-09-17 NOTE — PLAN OF CARE
SW received call from Sofy with [facility] ([phone]) regarding this Pt. Provided update and sent clinicals via [facility]ANNON.    Kirsten Francisco LCSW  Neurocritical Care   [facility]  [id number]

## 2020-09-17 NOTE — ASSESSMENT & PLAN NOTE
-- Continue Neuro checks q 1hr  -- Epilepsy Service following recommend 2 mg of ativan as needed if Pt has catatonic like seizure.   -- Seizure Precautions  -- Continue vEEG shows fluctuating epileptiform activity.  -- Continue cloBAZam Tab 10 mg bid                     Vimpat 200 mg bid                     Keppra 1000 mg bid  -- concern for encephalitis due to infectious etiology- LP performed 9/13/20. Follow up infectious work-up.    acyclovir 10mg/kg q8h   Consider antimicrobials if Neuro status does not improve or clinically Pt declines.

## 2020-09-17 NOTE — PROGRESS NOTES
"[facility]  Adult Nutrition  Progress Note    SUMMARY       Recommendations    Recommendation:   1. Continue CL diet, ADAT to regular texture per SLP    - Add boost breeze to increase intake on CL diet   2. If TF warranted, suggest restarting Isosource @ 10 mL/hr increase to goal rate of 45 mL/hr to provide pt with 1620 kcal, 73 g protein and 825 mL free water.    Additional water per MD. Hold for residuals >500 mL.  RD to monitor and follow up    Goals: Meet % EEN, EPN by RD f/u date  Nutrition Goal Status: progressing towards goal, goal not met  Communication of RD Recs: reviewed with RN    Reason for Assessment    Reason For Assessment: RD follow-up  Diagnosis: other (see comments)(Seizure disorder)  Relevant Medical History: HTN, Depression  Interdisciplinary Rounds: did not attend  General Information Comments: Pt resting in bed, TF off at this time. Pt diet advanced to CL diet, having bfst at time of visit. Pt states her appetite is ok currently. Pt states UBW ~125 lbs, likely unaccurate answer. Partial NFPE completed, pt with no s/s of malnutrition, at risk if poor PO continues.  Nutrition Discharge Planning: adequate intake via PO diet    Nutrition Risk Screen    Nutrition Risk Screen: other (see comments)(seizures)    Nutrition/Diet History    Spiritual, Cultural Beliefs, Gnosticism Practices, Values that Affect Care: no  Food Allergies: NKFA  Factors Affecting Nutritional Intake: clear liquid diet, difficulty/impaired swallowing, impaired cognitive status/motor control    Anthropometrics    Temp: 98.4 °F (36.9 °C)  Height Method: Stated  Height: 5' 2" (157.5 cm)  Height (inches): 62 in  Weight Method: Bed Scale  Weight: 74.9 kg (165 lb 2 oz)  Weight (lb): 165.13 lb  Ideal Body Weight (IBW), Female: 110 lb  % Ideal Body Weight, Female (lb): 150.12 %  BMI (Calculated): 30.2  BMI Grade: 30 - 34.9- obesity - grade I       Lab/Procedures/Meds    Pertinent Labs Reviewed: " reviewed  Pertinent Labs Comments: BUN 4  Pertinent Medications Reviewed: reviewed  Pertinent Medications Comments: statin; MVI; heparin    Estimated/Assessed Needs    Weight Used For Calorie Calculations: 75 kg (165 lb 5.5 oz)  Energy Calorie Requirements (kcal): 1591 kcal/d  Energy Need Method: [place]-St Jeor(1.25 PAL)  Protein Requirements: 75-90 g/d (1-1.2 g/kg)  Weight Used For Protein Calculations: 75 kg (165 lb 5.5 oz)  Estimated Fluid Requirement Method: other (see comments)(Per MD or 1 mL/kcal)  RDA Method (mL): 1591    Nutrition Prescription Ordered    Current Diet Order: Clear liquid diet  Nutrition Order Comments: TF held    Evaluation of Received Nutrient/Fluid Intake    I/O: -2.4 L since admit  Energy Calories Required: not meeting needs  Protein Required: not meeting needs  Fluid Required: meeting needs  Comments: LBM 9/16  Tolerance: tolerating  % Intake of Estimated Energy Needs: 0 - 25 %  % Meal Intake: 0 - 25 %    Nutrition Risk    Level of Risk/Frequency of Follow-up: high     Assessment and Plan    Nutrition Problem  Inadequate energy intake     Related to (etiology):   Inability to consume sufficient energy     Signs and Symptoms (as evidenced by):   NPO     Interventions(treatment strategy):  Collaboration of nutrition care w/ other providers  Enteral nutrition      Nutrition Diagnosis Status:   Continues    Monitor and Evaluation    Food and Nutrient Intake: energy intake, food and beverage intake, enteral nutrition intake  Food and Nutrient Adminstration: diet order, enteral and parenteral nutrition administration  Physical Activity and Function: nutrition-related ADLs and IADLs  Anthropometric Measurements: weight, weight change  Biochemical Data, Medical Tests and Procedures: inflammatory profile, lipid profile, gastrointestinal profile, glucose/endocrine profile, electrolyte and renal panel  Nutrition-Focused Physical Findings: overall appearance     Malnutrition Assessment  Orbital  Region (Subcutaneous Fat Loss): mild depletion  Upper Arm Region (Subcutaneous Fat Loss): well nourished   [place] Region (Muscle Loss): well nourished  Clavicle Bone Region (Muscle Loss): well nourished  Clavicle and Acromion Bone Region (Muscle Loss): well nourished  Dorsal Hand (Muscle Loss): mild depletion  Anterior Thigh Region (Muscle Loss): well nourished  Posterior Calf Region (Muscle Loss): well nourished     Nutrition Follow-Up    RD Follow-up?: Yes

## 2020-09-17 NOTE — PLAN OF CARE
[facility] Care Plan    POC reviewed with Donna Arias and family at 1400. Pt verbalized understanding. Valproic acid held due to high lab value. Questions and concerns addressed. No acute events today. Pt progressing toward goals. Will continue to monitor. See below and flowsheets for full assessment and VS info.       Neuro:  Eben Coma Scale  Best Eye Response: 4-->(E4) spontaneous  Best Motor Response: 6-->(M6) obeys commands  Best Verbal Response: 4-->(V4) confused  Eben Coma Scale Score: 14  Assessment Qualifiers: patient not sedated/intubated, no eye obstruction present  Pupil PERRLA: yes     24 hr Temp:  [98 °F (36.7 °C)-98.3 °F (36.8 °C)]     CV:   Rhythm: normal sinus rhythm  BP goals:   SBP < 220  MAP > 65    Resp:   O2 Device (Oxygen Therapy): room air  Oxygen Concentration (%): 24    Plan: N/A    GI/:  AVERY Total Score: 20  Diet/Nutrition Received: NPO  Last Bowel Movement: 09/16/20  Voiding Characteristics: external catheter, voids spontaneously without difficulty    Intake/Output Summary (Last 24 hours) at 9/16/2020 1904  Last data filed at 9/16/2020 1805  Gross per 24 hour   Intake 3250 ml   Output 2900 ml   Net 350 ml     Unmeasured Output  Urine Occurrence: 1  Stool Occurrence: 1  Emesis Occurrence: 0  Pad Count: 1    Labs/Accuchecks:  Recent Labs   Lab 09/16/20  0149   WBC 8.62   RBC 3.43*   HGB 9.9*   HCT 31.8*         Recent Labs   Lab 09/16/20  0149 09/16/20  1044     --    K 3.2* 3.9   CO2 24  --      --    BUN 5*  --    CREATININE 0.6  --    ALKPHOS 64  --    ALT 14  --    AST 13  --    BILITOT 0.3  --       Recent Labs   Lab 09/12/20  1151  09/16/20  0149   INR 1.0   < > 1.0   APTT 26.2  --   --     < > = values in this interval not displayed.      Recent Labs   Lab 09/13/20  0107   CPK 75   CPKMB 1.2   TROPONINI <0.006   MB 1.6       Electrolytes: N/A - electrolytes WDL  Accuchecks: Q6H    Gtts:   sodium chloride 0.9% 75 mL/hr at 09/16/20 9170        LDA/Wounds:  Lines/Drains/Airways       Drain              Female External Urinary Catheter 09/13/20 1700 3 days         NG/OG Tube 09/14/20 2159 nasogastric 14 Fr. Right nostril 1 day              Peripheral Intravenous Line                   Peripheral IV - Single Lumen 09/13/20 0656 24 G Left Hand 3 days         Peripheral IV - Single Lumen 09/14/20 1211 20 G Anterior;Right Forearm 2 days         Peripheral IV - Single Lumen 09/14/20 1334 18 G Right Antecubital 2 days                  Wounds: Yes  Wound care consulted: Yes

## 2020-09-17 NOTE — SUBJECTIVE & OBJECTIVE
Interval History: No acute events overnight, clinically significantly improved today. More alert, interactive and participates in conversation and exam. Continue current AED regimen.    Current Facility-Administered Medications   Medication Dose Route Frequency Provider Last Rate Last Dose    0.9%  NaCl infusion   Intravenous Continuous Andres Clements NP 75 mL/hr at 09/17/20 1200      acetaminophen suppository 650 mg  650 mg Rectal Q6H PRN Andres Clements NP        acetaminophen tablet 650 mg  650 mg Per NG tube Q4H PRN Balaji Kathleen MD   650 mg at 09/16/20 1928    acyclovir (ZOVIRAX) 500 mg in dextrose 5 % 100 mL IVPB  10 mg/kg (Ideal) Intravenous Q8H Irwin Akers  mL/hr at 09/17/20 0538 500 mg at 09/17/20 0538    aspirin chewable tablet 81 mg  81 mg Per NG tube Daily Luigi Andrews MD   81 mg at 09/17/20 0931    atorvastatin tablet 40 mg  40 mg Per NG tube Daily Luigi Andrews MD   40 mg at 09/17/20 0932    calcium gluconate 1g in dextrose 5% 100mL (ready to mix system)  1 g Intravenous PRN Gregory Ortega MD        cloBAZam Tab 20 mg  20 mg Per NG tube BID Luigi Andrews MD   20 mg at 09/17/20 0931    docusate 50 mg/5 mL liquid 100 mg  100 mg Per NG tube Daily Luigi Andrews MD        heparin (porcine) injection 5,000 Units  5,000 Units Subcutaneous Q8H Andres Clements NP   5,000 Units at 09/17/20 0538    labetalol 20 mg/4 mL (5 mg/mL) IV syring  10 mg Intravenous Q4H PRN Andres Clements NP        lacosamide (VIMPAT) 200 mg in sodium chloride 0.9% 100 mL IVPB  200 mg Intravenous Q12H Casandra Noyola  mL/hr at 09/17/20 1147 200 mg at 09/17/20 1147    levETIRAcetam (KEPPRA) 2,000 mg in dextrose 5 % 250 mL IVPB  2,000 mg Intravenous Q12H Luigi Andrews  mL/hr at 09/17/20 0932 2,000 mg at 09/17/20 0932    lorazepam injection 2 mg  2 mg Intravenous Q1H PRN Gregory Ortega MD        magnesium oxide tablet 800 mg  800 mg Per NG  tube PRN Balaji Kathleen MD        magnesium oxide tablet 800 mg  800 mg Per NG tube PRN Balaji Kathleen MD        magnesium sulfate 2g in water 50mL IVPB (premix)  2 g Intravenous PRN Gregory Ortega MD   2 g at 09/15/20 0312    magnesium sulfate 2g in water 50mL IVPB (premix)  4 g Intravenous PRN Gregory Ortega MD        multivitamin tablet 1 tablet  1 tablet Per NG tube Daily Luigi Andrews MD   1 tablet at 09/17/20 0942    ondansetron injection 4 mg  4 mg Intravenous Q6H PRN Andres Clements NP   4 mg at 09/16/20 1928    potassium chloride packet 40 mEq  40 mEq Per NG tube PRN Balaji Kathleen MD   40 mEq at 09/17/20 0538    potassium chloride packet 40 mEq  40 mEq Per NG tube PRN Balaji Kathleen MD        potassium chloride packet 60 mEq  60 mEq Per NG tube PRN Balaji Kathleen MD        potassium, sodium phosphates 280-160-250 mg packet 2 packet  2 packet Per NG tube PRN Balaji Kathleen MD        potassium, sodium phosphates 280-160-250 mg packet 2 packet  2 packet Per NG tube PRN Balaji Kathleen MD        potassium, sodium phosphates 280-160-250 mg packet 2 packet  2 packet Per NG tube PRN Balaji Kathleen MD        sertraline tablet 25 mg  25 mg Per NG tube Daily Luigi Andrews MD   25 mg at 09/17/20 0942    silodosin capsule 4 mg  4 mg Per NG tube Daily Nancy Coelho NP   4 mg at 09/17/20 0942    simethicone chewable tablet 80 mg  1 tablet Per NG tube TID PRN Cyn Elizalde PA-C        sodium chloride 0.9% flush 10 mL  10 mL Intravenous PRN Ingrid Hughes MD        sodium chloride 0.9% flush 3 mL  3 mL Intravenous Q8H Andres Clements NP 0 mL/hr at 09/13/20 0924 3 mL at 09/16/20 2200    sodium phosphate 15 mmol in dextrose 5 % 250 mL IVPB  15 mmol Intravenous PRN Gregory Ortega MD        sodium phosphate 20.01 mmol in dextrose 5 % 250 mL IVPB  20.01 mmol Intravenous PRN Gregory Ortega MD        sodium phosphate 30 mmol in dextrose 5  % 250 mL IVPB  30 mmol Intravenous PRN Gregory Ortega MD         Continuous Infusions:   sodium chloride 0.9% 75 mL/hr at 09/17/20 1200       Review of Systems   Constitutional: Negative for chills and fever.   Eyes: Negative for photophobia and redness.   Respiratory: Negative for cough and shortness of breath.    Cardiovascular: Negative for chest pain and leg swelling.   Gastrointestinal: Negative for nausea and vomiting.   Musculoskeletal: Positive for neck pain. Negative for neck stiffness.   Skin: Negative for pallor.   Neurological: Positive for seizures. Negative for dizziness, speech difficulty, weakness and headaches.   Psychiatric/Behavioral: Positive for hallucinations. Negative for agitation. The patient is not hyperactive.      Objective:     Vital Signs (Most Recent):  Temp: 98.8 °F (37.1 °C) (09/17/20 1100)  Pulse: 98 (09/17/20 1200)  Resp: (!) 23 (09/17/20 1200)  BP: 132/63 (09/17/20 1200)  SpO2: 99 % (09/17/20 1200) Vital Signs (24h Range):  Temp:  [98 °F (36.7 °C)-98.8 °F (37.1 °C)] 98.8 °F (37.1 °C)  Pulse:  [] 98  Resp:  [17-34] 23  SpO2:  [94 %-100 %] 99 %  BP: (115-166)/() 132/63     Weight: 74.9 kg (165 lb 2 oz)  Body mass index is 30.2 kg/m².    Physical Exam  Vitals signs and nursing note reviewed.   Constitutional:       Appearance: Normal appearance. She is normal weight.   HENT:      Head: Normocephalic.   Eyes:      Extraocular Movements: Extraocular movements intact.      Conjunctiva/sclera: Conjunctivae normal.      Pupils: Pupils are equal, round, and reactive to light.   Neck:      Musculoskeletal: Normal range of motion and neck supple. No neck rigidity.   Cardiovascular:      Pulses: Normal pulses.   Pulmonary:      Effort: Pulmonary effort is normal. No respiratory distress.   Musculoskeletal:         General: No swelling or deformity.   Skin:     General: Skin is warm and dry.   Neurological:      Mental Status: She is oriented to person, place, and time.    Psychiatric:         Speech: Speech normal.         NEUROLOGICAL EXAMINATION:     MENTAL STATUS   Oriented to person, place, and time.   Speech: speech is normal   Level of consciousness: alert       Alert, oriented to person, place, date. Interactive, participates in exam.     CRANIAL NERVES     CN III, IV, VI   Pupils are equal, round, and reactive to light.    CN VII   Facial expression full, symmetric.   Left facial weakness: mild left lower facial weakness.    CN VIII   CN VIII normal.     CN XI   CN XI normal.     MOTOR EXAM   Muscle bulk: normal  Overall muscle tone: normal       Moves all extremities spontaneously and symmetrically, follows commands        Significant Labs: All pertinent lab results from the past 24 hours have been reviewed.    Significant Studies: I have reviewed all pertinent imaging results/findings within the past 24 hours.

## 2020-09-17 NOTE — PROGRESS NOTES
[facility]  Neurocritical Care  Progress Note    Admit Date: 9/11/2020  Service Date: 09/17/2020  Length of Stay: 6    Subjective:     Chief Complaint: Seizure disorder    History of Present Illness: Ms Arias is a 59 yo female with sig pmhx of depression with noncompliance of meds for a month, Anxiety, and HTN who presents to [facility] for a higher level of care for right CESILIA and R/O seizure. Patient also PEC on 9/11/20 at OSH for Hallucinations. Per OSH note pt had continued seizure activity occurring every 5 min and was treated with Keppra and Vimpat. Neurology attempted lumbar puncture x 2, but was unsuccessful. CT scan and MRI brain revealed increased signal in the right frontal lobe that Neurology thought might be consistent with seizure activity. Patient is currently AAO x 3 and has no neurological deficits on exam.   9/17 EEG shows fluctuating epileptiform activity. Requiring frequent I/O caths. silodosin started. MRI brain today. Wound care consulted for skin breakdown to coccyx.      Hospital Course: 9/12/2020 Admit to [facility], EEG  09/13/2020: No epilepsy on EEG. Successful LP once Pt arrived on the floor. Pt with waxing and waning neuro exam. Upon arrival Pt able to answer questions and able to follow some commands.   9/14/2020: YOVANI on EEG-   9/15/2020: 2 clinical seizures per nurse early this morning, pending epilepsy reccs, continue EEG monitoring  09/16/2020: cEEG. Start TF. Autoimmune workup pending. Continue to adjust AEDs, appreciate epilepsy reccs.    Interval History:  cEEG. Start TF. Autoimmune workup pending. Continue to adjust AEDs, appreciate epilepsy reccs.    Review of Systems:   Constitutional: Negative for chills and fever.   Eyes: Negative for photophobia and redness.   Respiratory: Negative for cough and shortness of breath.    Cardiovascular: Negative for chest pain and leg swelling.   Gastrointestinal: Negative for nausea and vomiting.   Musculoskeletal: Positive for neck  pain. Negative for neck stiffness.   Skin: Negative for pallor.   Neurological: Positive for seizures. Negative for dizziness, speech difficulty, weakness and headaches.   Psychiatric/Behavioral: Negative for agitation and hallucinations. The patient is not hyperactive      Vitals:   Temp: 98.4 °F (36.9 °C)  Pulse: 103  Rhythm: sinus tachycardia  BP: 134/81  MAP (mmHg): 93  Resp: (!) 22  SpO2: 99 %  O2 Device (Oxygen Therapy): room air    Temp  Min: 98 °F (36.7 °C)  Max: 98.5 °F (36.9 °C)  Pulse  Min: 70  Max: 104  BP  Min: 115/63  Max: 166/74  MAP (mmHg)  Min: 82  Max: 131  Resp  Min: 17  Max: 34  SpO2  Min: 94 %  Max: 100 %    09/16 0701 - 09/17 0700  In: 3200 [I.V.:1990]  Out: 4200 [Urine:4200]   Unmeasured Output  Urine Occurrence: 1  Stool Occurrence: 0  Emesis Occurrence: 0  Pad Count: 1     Examination:   Constitutional: Well-nourished and -developed. No apparent distress.   Eyes: Conjunctiva clear, anicteric. Lids no lesions.  Head/Ears/Nose/Mouth/Throat/Neck: Moist mucous membranes. External ears, nose atraumatic.   Cardiovascular: Regular rhythm. No murmurs. No leg edema.  Respiratory: Comfortable respirations. Clear to auscultation.  Gastrointestinal: No hernia. Soft, nondistended, nontender. + bowel sounds.    Neurologic:  -GCS [id number]  -Alert. Oriented to person, place, and time. Speech fluent. Follows commands.  -Cranial nerves II-XII grossly intact, PERRL 3+ EOMI. + cough gag  -Motor 5/5 all extremities. MCDANIEL spon. And antigravity  -Sensation intact bilat    Medications:   Continuoussodium chloride 0.9%, Last Rate: 75 mL/hr at 09/17/20 0800    Scheduledacyclovir, 10 mg/kg (Ideal), Q8H  aspirin, 81 mg, Daily  atorvastatin, 40 mg, Daily  cloBAZam, 20 mg, BID  docusate, 100 mg, Daily  heparin (porcine), 5,000 Units, Q8H  lacosamide (VIMPAT) IVPB, 200 mg, Q12H  levetiracetam IVPB, 2,000 mg, Q12H  multivitamin, 1 tablet, Daily  sertraline, 25 mg, Daily  silodosin, 4 mg, Daily  sodium chloride 0.9%, 3 mL,  Q8H    PRNacetaminophen, 650 mg, Q6H PRN  acetaminophen, 650 mg, Q4H PRN  calcium gluconate IVPB, 1 g, PRN  labetalol, 10 mg, Q4H PRN  lorazepam, 2 mg, Q1H PRN  magnesium oxide, 800 mg, PRN  magnesium oxide, 800 mg, PRN  magnesium sulfate IVPB, 2 g, PRN  magnesium sulfate IVPB, 4 g, PRN  ondansetron, 4 mg, Q6H PRN  potassium chloride, 40 mEq, PRN  potassium chloride, 40 mEq, PRN  potassium chloride, 60 mEq, PRN  potassium, sodium phosphates, 2 packet, PRN  potassium, sodium phosphates, 2 packet, PRN  potassium, sodium phosphates, 2 packet, PRN  simethicone, 1 tablet, TID PRN  sodium chloride 0.9%, 10 mL, PRN  sodium phosphate IVPB, 15 mmol, PRN  sodium phosphate IVPB, 20.01 mmol, PRN  sodium phosphate IVPB, 30 mmol, PRN       Today I independently reviewed pertinent medications, lines/drains/airways, imaging, laboratory results, microbiology results, notably:     ISTAT: No results for input(s): PH, PCO2, PO2, POCSATURATED, HCO3, BE, POCNA, POCK, POCTCO2, POCGLU, POCICA, POCLAC, SAMPLE in the last 24 hours.   Chem:   Recent Labs   Lab 09/17/20  0257      K 3.8      CO2 27   GLU 87   BUN 4*   CREATININE 0.7   ESTGFRAFRICA >60.0   EGFRNONAA >60.0   CALCIUM 8.8   MG 2.1   PHOS 4.4   ANIONGAP 11   PROT 6.2   ALBUMIN 3.3*   BILITOT 0.3   ALKPHOS 82   AST 17   ALT 17     Heme:   Recent Labs   Lab 09/17/20  0257   WBC 11.91   HGB 11.4*   HCT 35.7*   *   INR 1.0     Endo:   Recent Labs   Lab 09/16/20  1735 09/17/20  0029 09/17/20  0600   POCTGLUCOSE 106 90 102      Assessment/Plan:     Neuro  * Seizure disorder  -- Continue Neuro checks q 1hr  -- Epilepsy Service following recommend 2 mg of ativan as needed if Pt has catatonic like seizure.   -- Seizure Precautions  -- Continue vEEG shows fluctuating epileptiform activity.  -- Continue cloBAZam Tab 10 mg bid                     Vimpat 200 mg bid                     Keppra 1000 mg bid  -- concern for encephalitis due to infectious etiology- LP performed  9/13/20. Follow up infectious work-up.    acyclovir 10mg/kg q8h   Consider antimicrobials if Neuro status does not improve or clinically Pt declines.         Stroke  -- Continue Neuro checks q 1hr  -- Vascular Neurology following  -- MRI (9/12/20) - changes involving the right frontal lobe increased signal, cortical banding which can be seen with seizure activity. Given presentation stroke less likely.   -- SBP goal <220  -- PT/OT/Speech  -- ASA 81 mg daily      Atorvastatin 40 mg daily     SQH 5000 units q8h  9/16- Very unlikely causes by cerebral infarct or ischemia. More concern of autoimmune etiology causing seizures per epilepsy, appreciate reccs.     Renal/  Hypokalemia  -- replace prn per electrolyte replacement order            The patient is being Prophylaxed for:  Venous Thromboembolism with: Mechanical or Chemical  Stress Ulcer with: Not Applicable   Ventilator Pneumonia with: not applicable    Activity Orders          Straight Cath starting at 09/17 0354    Diet clear liquid: Clear Liquid starting at 09/16 0950        Full Code     I have spent 35 min with this patient, with over 50% of this time spent coordinating care and speaking with the family    Nancy Coelho NP  Neurocritical Care  [facility]

## 2020-09-17 NOTE — PT/OT/SLP PROGRESS
Physical Therapy Treatment    Patient Name:  Donna Arias   MRN:  [id number]    Recommendations:     Discharge Recommendations:  rehabilitation facility   Discharge Equipment Recommendations: none   Barriers to discharge: increased level of assistance, fall risk    Assessment:     Donna Arias is a 60 y.o. female admitted with a medical diagnosis of Seizure disorder.  She presents with the following impairments/functional limitations:  weakness, impaired endurance, impaired sensation, impaired self care skills, impaired functional mobilty, gait instability, impaired balance, impaired cognition, decreased upper extremity function, decreased lower extremity function, decreased safety awareness. PT recommending inpatient rehab facility to address the above listed deficits, improve functional mobility, and maximize return to PLOF.    Rehab Prognosis: Good; patient would benefit from acute skilled PT services to address these deficits and reach maximum level of function.    Recent Surgery: * No surgery found *      Plan:     During this hospitalization, patient to be seen 4 x/week to address the identified rehab impairments via gait training, therapeutic activities, therapeutic exercises, neuromuscular re-education and progress toward the following goals:    · Plan of Care Expires:  10/14/20    Subjective     Chief Complaint: none verbalized  Patient/Family Comments/goals: return to PLOF  Pain/Comfort:  · Pain Rating 1: other (see comments)(no c/o pain verbalized)      Objective:     Communicated with RN prior to session.  Patient found HOB elevated with bed alarm, blood pressure cuff, EEG, NG tube, PureWick, peripheral IV, pulse ox (continuous), restraints, SCD, telemetry upon PT entry to room.     General Precautions: Standard, seizure, fall   Orthopedic Precautions:N/A   Braces: N/A     Functional Mobility:  · Bed Mobility:     · Rolling Left:  contact guard assistance  · Scooting: contact guard assistance  · Supine to  Sit: minimum assistance; pt complaining of mild lightheadedness upon sitting EOB (SBP drop of ~20 mmHg, returned to baseline within 3 minutes)  · Sit to Supine: moderate assistance  · Transfers:     · Sit to Stand (x2 trials):  minimum assistance with no AD and hand-held assist; pt complaining of mild lightheadedness upon standing (SBP drop of ~10 mmHg, returned to baseline within 3 minutes). Seated rest break taken between trials).  · Gait: 3 smal steps left, Nikole, no AD; c/o lightheaded sensation, further mobility deferred and pt returned to supine  · EOb ~15 mins  · Balance:   · Static Sitting: SBA-CGA  · Dynamic Sitting: Not performed this date  · Static Standing: Nikole  · Dynamic standing: Nikole for steps with HHA      AM-PAC 6 CLICK MOBILITY  Turning over in bed (including adjusting bedclothes, sheets and blankets)?: 3  Sitting down on and standing up from a chair with arms (e.g., wheelchair, bedside commode, etc.): 3  Moving from lying on back to sitting on the side of the bed?: 3  Moving to and from a bed to a chair (including a wheelchair)?: 3  Need to walk in hospital room?: 3  Climbing 3-5 steps with a railing?: 3  Basic Mobility Total Score: 18       Therapeutic Activities and Exercises:   Patient educated on role of therapy, goals of session, and benefits of mobilizing.   Discussed PT plan of care during hospitalization.   Patient educated on calling for assistance.   Patient educated on how their diagnosis impacts their mobility within PT scope of practice.   Communication board up to date.  All questions answered within PT scope of practice.      Patient left HOB elevated with all lines intact, call button in reach, bed alarm on, restraints reapplied at end of session and RN notified.    GOALS:   Multidisciplinary Problems     Physical Therapy Goals        Problem: Physical Therapy Goal    Goal Priority Disciplines Outcome Goal Variances Interventions   Physical Therapy Goal     PT, PT/OT Ongoing,  Progressing     Description: Goals to be met by: 2020    Patient will increase functional independence with mobility by performin. Pt will perform bed mobility (rolling L/R, scooting, and bridging) with Nikole. - goal met 2020   Updated: modified independent  2. Pt will perform supine to/from sit with Nikole. - goal met 2020   Updated: modified independent  3. Pt will sit EOB x 15 mins with B UE support with min assistance.  4. Pt will perform sit to stand with min assistance. - goal met 2020   Updated: modified independent  5. Pt will ambulate 50 feet with min assistance.  6. Pt will perform there-ex from handout x 15 reps to improve strength for functional mobility.  7. Pt will ascend/descend 2 steps with 1 HR and Nikole.                         Time Tracking:     PT Received On: 20  PT Start Time: 0935     PT Stop Time: 1000  PT Total Time (min): 25 min   Additional staffing present: OT    Billable Minutes: Neuromuscular Re-education 23    Treatment Type: Treatment  PT/PTA: PT     PTA Visit Number: 0     Pattie Sinha, PT  2020

## 2020-09-18 LAB
ALBUMIN SERPL BCP-MCNC: 3.4 G/DL (ref 3.5–5.2)
ALP SERPL-CCNC: 87 U/L (ref 55–135)
ALT SERPL W/O P-5'-P-CCNC: 20 U/L (ref 10–44)
ANION GAP SERPL CALC-SCNC: 12 MMOL/L (ref 8–16)
AST SERPL-CCNC: 21 U/L (ref 10–40)
B BURGDOR IGG SER QL IB: NEGATIVE
B BURGDOR IGM SER QL IB: NEGATIVE
BASOPHILS # BLD AUTO: 0.04 K/UL (ref 0–0.2)
BASOPHILS NFR BLD: 0.4 % (ref 0–1.9)
BILIRUB SERPL-MCNC: 0.3 MG/DL (ref 0.1–1)
BUN SERPL-MCNC: 3 MG/DL (ref 6–20)
CALCIUM SERPL-MCNC: 9.1 MG/DL (ref 8.7–10.5)
CHLORIDE SERPL-SCNC: 102 MMOL/L (ref 95–110)
CO2 SERPL-SCNC: 25 MMOL/L (ref 23–29)
CREAT SERPL-MCNC: 0.7 MG/DL (ref 0.5–1.4)
DIFFERENTIAL METHOD: ABNORMAL
EOSINOPHIL # BLD AUTO: 0.2 K/UL (ref 0–0.5)
EOSINOPHIL NFR BLD: 2.1 % (ref 0–8)
ERYTHROCYTE [DISTWIDTH] IN BLOOD BY AUTOMATED COUNT: 14.5 % (ref 11.5–14.5)
EST. GFR  (AFRICAN AMERICAN): >60 ML/MIN/1.73 M^2
EST. GFR  (NON AFRICAN AMERICAN): >60 ML/MIN/1.73 M^2
GLUCOSE SERPL-MCNC: 108 MG/DL (ref 70–110)
HCT VFR BLD AUTO: 35.9 % (ref 37–48.5)
HGB BLD-MCNC: 11.7 G/DL (ref 12–16)
IMM GRANULOCYTES # BLD AUTO: 0.03 K/UL (ref 0–0.04)
IMM GRANULOCYTES NFR BLD AUTO: 0.3 % (ref 0–0.5)
INR PPP: 1 (ref 0.8–1.2)
LYMPHOCYTES # BLD AUTO: 1.9 K/UL (ref 1–4.8)
LYMPHOCYTES NFR BLD: 17.8 % (ref 18–48)
MAGNESIUM SERPL-MCNC: 1.9 MG/DL (ref 1.6–2.6)
MCH RBC QN AUTO: 29.6 PG (ref 27–31)
MCHC RBC AUTO-ENTMCNC: 32.6 G/DL (ref 32–36)
MCV RBC AUTO: 91 FL (ref 82–98)
MONOCYTES # BLD AUTO: 0.7 K/UL (ref 0.3–1)
MONOCYTES NFR BLD: 6.4 % (ref 4–15)
NEUTROPHILS # BLD AUTO: 7.9 K/UL (ref 1.8–7.7)
NEUTROPHILS NFR BLD: 73 % (ref 38–73)
NRBC BLD-RTO: 0 /100 WBC
PHOSPHATE SERPL-MCNC: 4 MG/DL (ref 2.7–4.5)
PLATELET # BLD AUTO: 338 K/UL (ref 150–350)
PMV BLD AUTO: 9.1 FL (ref 9.2–12.9)
POCT GLUCOSE: 114 MG/DL (ref 70–110)
POCT GLUCOSE: 115 MG/DL (ref 70–110)
POCT GLUCOSE: 125 MG/DL (ref 70–110)
POCT GLUCOSE: 127 MG/DL (ref 70–110)
POCT GLUCOSE: 133 MG/DL (ref 70–110)
POTASSIUM SERPL-SCNC: 3.6 MMOL/L (ref 3.5–5.1)
PROT SERPL-MCNC: 6.8 G/DL (ref 6–8.4)
PROTHROMBIN TIME: 11.3 SEC (ref 9–12.5)
RBC # BLD AUTO: 3.95 M/UL (ref 4–5.4)
SODIUM SERPL-SCNC: 139 MMOL/L (ref 136–145)
WBC # BLD AUTO: 10.85 K/UL (ref 3.9–12.7)
WEST NILE VIRUS CSF INTERP: NORMAL
WNV IGG CSF QL: NEGATIVE
WNV IGM CSF QL IA: NEGATIVE

## 2020-09-18 PROCEDURE — 99233 PR SUBSEQUENT HOSPITAL CARE,LEVL III: ICD-10-PCS | Mod: ,,, | Performed by: PSYCHIATRY & NEUROLOGY

## 2020-09-18 PROCEDURE — 63600175 PHARM REV CODE 636 W HCPCS: Performed by: NURSE PRACTITIONER

## 2020-09-18 PROCEDURE — 80053 COMPREHEN METABOLIC PANEL: CPT

## 2020-09-18 PROCEDURE — 85025 COMPLETE CBC W/AUTO DIFF WBC: CPT

## 2020-09-18 PROCEDURE — 63600175 PHARM REV CODE 636 W HCPCS: Performed by: STUDENT IN AN ORGANIZED HEALTH CARE EDUCATION/TRAINING PROGRAM

## 2020-09-18 PROCEDURE — 25000003 PHARM REV CODE 250: Performed by: ANESTHESIOLOGY

## 2020-09-18 PROCEDURE — 97530 THERAPEUTIC ACTIVITIES: CPT

## 2020-09-18 PROCEDURE — 63600175 PHARM REV CODE 636 W HCPCS: Performed by: HOSPITALIST

## 2020-09-18 PROCEDURE — 25000003 PHARM REV CODE 250: Performed by: STUDENT IN AN ORGANIZED HEALTH CARE EDUCATION/TRAINING PROGRAM

## 2020-09-18 PROCEDURE — 99233 SBSQ HOSP IP/OBS HIGH 50: CPT | Mod: ,,, | Performed by: PSYCHIATRY & NEUROLOGY

## 2020-09-18 PROCEDURE — 25000003 PHARM REV CODE 250: Performed by: NURSE PRACTITIONER

## 2020-09-18 PROCEDURE — 99233 SBSQ HOSP IP/OBS HIGH 50: CPT | Mod: ,,, | Performed by: NURSE PRACTITIONER

## 2020-09-18 PROCEDURE — 25000003 PHARM REV CODE 250: Performed by: HOSPITALIST

## 2020-09-18 PROCEDURE — C9254 INJECTION, LACOSAMIDE: HCPCS | Performed by: NURSE PRACTITIONER

## 2020-09-18 PROCEDURE — 83735 ASSAY OF MAGNESIUM: CPT

## 2020-09-18 PROCEDURE — 84100 ASSAY OF PHOSPHORUS: CPT

## 2020-09-18 PROCEDURE — 25000003 PHARM REV CODE 250: Performed by: PSYCHIATRY & NEUROLOGY

## 2020-09-18 PROCEDURE — A4216 STERILE WATER/SALINE, 10 ML: HCPCS | Performed by: NURSE PRACTITIONER

## 2020-09-18 PROCEDURE — 97535 SELF CARE MNGMENT TRAINING: CPT

## 2020-09-18 PROCEDURE — 11000001 HC ACUTE MED/SURG PRIVATE ROOM

## 2020-09-18 PROCEDURE — 99233 PR SUBSEQUENT HOSPITAL CARE,LEVL III: ICD-10-PCS | Mod: ,,, | Performed by: NURSE PRACTITIONER

## 2020-09-18 PROCEDURE — 85610 PROTHROMBIN TIME: CPT

## 2020-09-18 PROCEDURE — 94761 N-INVAS EAR/PLS OXIMETRY MLT: CPT

## 2020-09-18 PROCEDURE — 63600175 PHARM REV CODE 636 W HCPCS: Performed by: PSYCHIATRY & NEUROLOGY

## 2020-09-18 RX ORDER — DOCUSATE SODIUM 50 MG/5ML
100 LIQUID ORAL DAILY
Status: DISCONTINUED | OUTPATIENT
Start: 2020-09-19 | End: 2020-09-18

## 2020-09-18 RX ORDER — LEVETIRACETAM 500 MG/1
500 TABLET ORAL 2 TIMES DAILY
Status: COMPLETED | OUTPATIENT
Start: 2020-09-19 | End: 2020-09-20

## 2020-09-18 RX ORDER — DOCUSATE SODIUM 50 MG/5ML
100 LIQUID ORAL DAILY
Status: CANCELLED | OUTPATIENT
Start: 2020-09-19

## 2020-09-18 RX ORDER — ACETAMINOPHEN 500 MG
500 TABLET ORAL EVERY 4 HOURS PRN
Status: DISCONTINUED | OUTPATIENT
Start: 2020-09-18 | End: 2020-09-24 | Stop reason: HOSPADM

## 2020-09-18 RX ORDER — SILODOSIN 4 MG/1
4 CAPSULE ORAL DAILY
Status: CANCELLED | OUTPATIENT
Start: 2020-09-19

## 2020-09-18 RX ORDER — PANTOPRAZOLE SODIUM 40 MG/1
40 TABLET, DELAYED RELEASE ORAL
Status: DISCONTINUED | OUTPATIENT
Start: 2020-09-18 | End: 2020-09-19

## 2020-09-18 RX ORDER — CLOBAZAM 10 MG/1
20 TABLET ORAL 2 TIMES DAILY
Status: DISCONTINUED | OUTPATIENT
Start: 2020-09-18 | End: 2020-09-22

## 2020-09-18 RX ORDER — NAPROXEN SODIUM 220 MG/1
81 TABLET, FILM COATED ORAL DAILY
Status: DISCONTINUED | OUTPATIENT
Start: 2020-09-19 | End: 2020-09-19

## 2020-09-18 RX ORDER — ACETAMINOPHEN 325 MG/1
650 TABLET ORAL EVERY 4 HOURS PRN
Status: CANCELLED | OUTPATIENT
Start: 2020-09-18

## 2020-09-18 RX ORDER — LEVETIRACETAM 10 MG/ML
1000 INJECTION INTRAVASCULAR EVERY 12 HOURS
Status: COMPLETED | OUTPATIENT
Start: 2020-09-18 | End: 2020-09-19

## 2020-09-18 RX ORDER — POLYETHYLENE GLYCOL 3350 17 G/17G
17 POWDER, FOR SOLUTION ORAL 2 TIMES DAILY PRN
Status: DISCONTINUED | OUTPATIENT
Start: 2020-09-18 | End: 2020-09-24 | Stop reason: HOSPADM

## 2020-09-18 RX ORDER — ATORVASTATIN CALCIUM 20 MG/1
40 TABLET, FILM COATED ORAL DAILY
Status: DISCONTINUED | OUTPATIENT
Start: 2020-09-19 | End: 2020-09-19

## 2020-09-18 RX ORDER — SIMETHICONE 80 MG
1 TABLET,CHEWABLE ORAL 3 TIMES DAILY PRN
Status: CANCELLED | OUTPATIENT
Start: 2020-09-18

## 2020-09-18 RX ORDER — LACOSAMIDE 50 MG/1
200 TABLET ORAL EVERY 12 HOURS
Status: DISCONTINUED | OUTPATIENT
Start: 2020-09-18 | End: 2020-09-24 | Stop reason: HOSPADM

## 2020-09-18 RX ORDER — NAPROXEN SODIUM 220 MG/1
81 TABLET, FILM COATED ORAL DAILY
Status: CANCELLED | OUTPATIENT
Start: 2020-09-19

## 2020-09-18 RX ORDER — CLOBAZAM 10 MG/1
20 TABLET ORAL 2 TIMES DAILY
Status: CANCELLED | OUTPATIENT
Start: 2020-09-18

## 2020-09-18 RX ORDER — SERTRALINE HYDROCHLORIDE 25 MG/1
25 TABLET, FILM COATED ORAL DAILY
Status: DISCONTINUED | OUTPATIENT
Start: 2020-09-19 | End: 2020-09-21

## 2020-09-18 RX ORDER — SERTRALINE HYDROCHLORIDE 25 MG/1
25 TABLET, FILM COATED ORAL DAILY
Status: CANCELLED | OUTPATIENT
Start: 2020-09-19

## 2020-09-18 RX ORDER — ENOXAPARIN SODIUM 100 MG/ML
40 INJECTION SUBCUTANEOUS EVERY 24 HOURS
Status: DISCONTINUED | OUTPATIENT
Start: 2020-09-18 | End: 2020-09-24 | Stop reason: HOSPADM

## 2020-09-18 RX ADMIN — ENOXAPARIN SODIUM 40 MG: 40 INJECTION SUBCUTANEOUS at 09:09

## 2020-09-18 RX ADMIN — LEVETIRACETAM INJECTION 1000 MG: 10 INJECTION INTRAVENOUS at 09:09

## 2020-09-18 RX ADMIN — SODIUM CHLORIDE: 0.9 INJECTION, SOLUTION INTRAVENOUS at 09:09

## 2020-09-18 RX ADMIN — ASPIRIN 81 MG CHEWABLE TABLET 81 MG: 81 TABLET CHEWABLE at 08:09

## 2020-09-18 RX ADMIN — SERTRALINE HYDROCHLORIDE 25 MG: 25 TABLET ORAL at 08:09

## 2020-09-18 RX ADMIN — SODIUM CHLORIDE 200 MG: 0.9 INJECTION, SOLUTION INTRAVENOUS at 12:09

## 2020-09-18 RX ADMIN — HEPARIN SODIUM 5000 UNITS: 5000 INJECTION INTRAVENOUS; SUBCUTANEOUS at 05:09

## 2020-09-18 RX ADMIN — POTASSIUM CHLORIDE 40 MEQ: 1.5 POWDER, FOR SOLUTION ORAL at 05:09

## 2020-09-18 RX ADMIN — ATORVASTATIN CALCIUM 40 MG: 20 TABLET, FILM COATED ORAL at 08:09

## 2020-09-18 RX ADMIN — ACYCLOVIR SODIUM 500 MG: 1000 INJECTION, SOLUTION INTRAVENOUS at 03:09

## 2020-09-18 RX ADMIN — Medication 3 ML: at 02:09

## 2020-09-18 RX ADMIN — DOCUSATE SODIUM 100 MG: 50 LIQUID ORAL at 08:09

## 2020-09-18 RX ADMIN — CLOBAZAM 20 MG: 10 TABLET ORAL at 09:09

## 2020-09-18 RX ADMIN — SILODOSIN 4 MG: 4 CAPSULE ORAL at 08:09

## 2020-09-18 RX ADMIN — PANTOPRAZOLE SODIUM 40 MG: 40 TABLET, DELAYED RELEASE ORAL at 05:09

## 2020-09-18 RX ADMIN — Medication 3 ML: at 06:09

## 2020-09-18 RX ADMIN — ACYCLOVIR SODIUM 500 MG: 1000 INJECTION, SOLUTION INTRAVENOUS at 05:09

## 2020-09-18 RX ADMIN — CLOBAZAM 20 MG: 10 TABLET ORAL at 08:09

## 2020-09-18 RX ADMIN — HEPARIN SODIUM 5000 UNITS: 5000 INJECTION INTRAVENOUS; SUBCUTANEOUS at 02:09

## 2020-09-18 RX ADMIN — ACETAMINOPHEN 650 MG: 325 TABLET ORAL at 04:09

## 2020-09-18 RX ADMIN — SODIUM CHLORIDE 200 MG: 0.9 INJECTION, SOLUTION INTRAVENOUS at 11:09

## 2020-09-18 RX ADMIN — THERA TABS 1 TABLET: TAB at 08:09

## 2020-09-18 RX ADMIN — LEVETIRACETAM 2000 MG: 100 INJECTION, SOLUTION, CONCENTRATE INTRAVENOUS at 08:09

## 2020-09-18 NOTE — PROGRESS NOTES
[facility]  Neurocritical Care  Progress Note    Admit Date: 9/11/2020  Service Date: 09/18/2020  Length of Stay: 7    Subjective:     Chief Complaint: New onset seizure with abnormal neurological exam without head trauma    History of Present Illness: Ms Arias is a 59 yo female with sig pmhx of depression with noncompliance of meds for a month, Anxiety, and HTN who presents to [facility] for a higher level of care for right CESILIA and R/O seizure. Patient also PEC on 9/11/20 at OSH for Hallucinations. Per OSH note pt had continued seizure activity occurring every 5 min and was treated with Keppra and Vimpat. Neurology attempted lumbar puncture x 2, but was unsuccessful. CT scan and MRI brain revealed increased signal in the right frontal lobe that Neurology thought might be consistent with seizure activity. Patient is currently AAO x 3 and has no neurological deficits on exam.   9/17 EEG shows fluctuating epileptiform activity. Requiring frequent I/O caths. silodosin started. MRI brain today. Wound care consulted for skin breakdown to coccyx.      Hospital Course: 9/12/2020 Admit to [facility], EEG  09/13/2020: No epilepsy on EEG. Successful LP once Pt arrived on the floor. Pt with waxing and waning neuro exam. Upon arrival Pt able to answer questions and able to follow some commands.   9/14/2020: YOVANI, on EEG-   9/15/2020: 2 clinical seizures per nurse early this morning, pending epilepsy reccs, continue EEG monitoring  09/16/2020: cEEG. Start TF. Autoimmune workup pending. Continue to adjust AEDs, appreciate epilepsy reccs.  9/17/2020Off EEG currently. AAOx3 Continue AEDs. Will step down to hospital with epilepsy following.    Interval History: Off EEG currently. AAOx3 Continue AEDs. Will step down to hospital with epilepsy following.    Review of Systems:   Constitutional: Negative for chills and fever.   Eyes: Negative for photophobia and redness.   Respiratory: Negative for cough and shortness of breath.     Cardiovascular: Negative for chest pain and leg swelling.   Gastrointestinal: Negative for nausea and vomiting.   Musculoskeletal: Positive for neck pain. Negative for neck stiffness.   Skin: Negative for pallor.   Neurological: Positive for seizures. Negative for dizziness, speech difficulty, weakness and headaches.   Psychiatric/Behavioral: Negative for agitation and hallucinations. The patient is not hyperactive           Vitals:   Temp: 99.4 °F (37.4 °C)  Pulse: 99  Rhythm: normal sinus rhythm  BP: (!) 198/92  MAP (mmHg): 132  Resp: (!) 25  SpO2: (!) 94 %  O2 Device (Oxygen Therapy): room air    Temp  Min: 98.7 °F (37.1 °C)  Max: 99.6 °F (37.6 °C)  Pulse  Min: 80  Max: 113  BP  Min: 125/67  Max: 198/92  MAP (mmHg)  Min: 84  Max: 132  Resp  Min: 16  Max: 36  SpO2  Min: 92 %  Max: 100 %    09/17 0701 - 09/18 0700  In: 2853.8 [I.V.:1853.8]  Out: 3350 [Urine:3350]   Unmeasured Output  Urine Occurrence: 1  Stool Occurrence: 0  Emesis Occurrence: 0  Pad Count: 3     Examination:   Constitutional: Well-nourished and -developed. No apparent distress.   Eyes: Conjunctiva clear, anicteric. Lids no lesions.  Head/Ears/Nose/Mouth/Throat/Neck: Moist mucous membranes. External ears, nose atraumatic.   Cardiovascular: Regular rhythm. No murmurs. No leg edema.  Respiratory: Comfortable respirations. Clear to auscultation.  Gastrointestinal: No hernia. Soft, nondistended, nontender. + bowel sounds.     Neurologic:  -GCS [id number]  -Alert. Oriented to person, place, and time. Speech fluent. Follows commands.  -Cranial nerves II-XII grossly intact, PERRL 3+ EOMI. + cough gag  -Motor 5/5 all extremities. MCDANIEL spon. And antigravity  -Sensation intact bilat      Medications:   Continuoussodium chloride 0.9%, Last Rate: 75 mL/hr at 09/18/20 1105    Scheduledacyclovir, 10 mg/kg (Ideal), Q8H  aspirin, 81 mg, Daily  atorvastatin, 40 mg, Daily  cloBAZam, 20 mg, BID  docusate, 100 mg, Daily  heparin (porcine), 5,000 Units, Q8H  lacosamide  (VIMPAT) IVPB, 200 mg, Q12H  levetiracetam IVPB, 2,000 mg, Q12H  multivitamin, 1 tablet, Daily  pantoprazole, 40 mg, BID AC  sertraline, 25 mg, Daily  silodosin, 4 mg, Daily  sodium chloride 0.9%, 3 mL, Q8H    PRNacetaminophen, 650 mg, Q6H PRN  acetaminophen, 650 mg, Q4H PRN  calcium gluconate IVPB, 1 g, PRN  labetalol, 10 mg, Q4H PRN  lorazepam, 2 mg, Q1H PRN  magnesium oxide, 800 mg, PRN  magnesium oxide, 800 mg, PRN  magnesium sulfate IVPB, 2 g, PRN  magnesium sulfate IVPB, 4 g, PRN  ondansetron, 4 mg, Q6H PRN  potassium chloride, 40 mEq, PRN  potassium chloride, 40 mEq, PRN  potassium chloride, 60 mEq, PRN  potassium, sodium phosphates, 2 packet, PRN  potassium, sodium phosphates, 2 packet, PRN  potassium, sodium phosphates, 2 packet, PRN  simethicone, 1 tablet, TID PRN  sodium chloride 0.9%, 10 mL, PRN  sodium phosphate IVPB, 15 mmol, PRN  sodium phosphate IVPB, 20.01 mmol, PRN  sodium phosphate IVPB, 30 mmol, PRN       Today I independently reviewed pertinent medications, lines/drains/airways, imaging, laboratory results, microbiology results, notably:     ISTAT: No results for input(s): PH, PCO2, PO2, POCSATURATED, HCO3, BE, POCNA, POCK, POCTCO2, POCGLU, POCICA, POCLAC, SAMPLE in the last 24 hours.   Chem:   Recent Labs   Lab 09/18/20  0403      K 3.6      CO2 25      BUN 3*   CREATININE 0.7   ESTGFRAFRICA >60.0   EGFRNONAA >60.0   CALCIUM 9.1   MG 1.9   PHOS 4.0   ANIONGAP 12   PROT 6.8   ALBUMIN 3.4*   BILITOT 0.3   ALKPHOS 87   AST 21   ALT 20     Heme:   Recent Labs   Lab 09/18/20  0403   WBC 10.85   HGB 11.7*   HCT 35.9*      INR 1.0     Endo:   Recent Labs   Lab 09/18/20  0007 09/18/20  0522 09/18/20  1148   POCTGLUCOSE 114* 115* 133*      Assessment/Plan:     Neuro  Stroke  -- Continue Neuro checks q 1hr  -- Vascular Neurology following  -- MRI (9/12/20) - changes involving the right frontal lobe increased signal, cortical banding which can be seen with seizure activity. Given  presentation stroke less likely.   -- SBP goal <220  -- PT/OT/Speech  -- ASA 81 mg daily      Atorvastatin 40 mg daily     SQH 5000 units q8h  9/16- Very unlikely causes by cerebral infarct or ischemia. More concern of autoimmune etiology causing seizures per epilepsy, appreciate reccs.     Renal/  Hypokalemia  -- replace prn per electrolyte replacement order      Other  * New onset seizure with abnormal neurological exam without head trauma  -- Continue Neuro checks q 1hr  -- Epilepsy Service following recommend 2 mg of ativan as needed if Pt has catatonic like seizure.   -- Seizure Precautions  -- Continue vEEG shows fluctuating epileptiform activity  -9/17 removed EEG. For imaging. Epilepsy giving pt a break from EEG  -- Continue cloBAZam Tab 10 mg bid                     Vimpat 200 mg bid                     Keppra 1000 mg bid  -- concern for encephalitis due to infectious etiology- LP performed 9/13/20. Follow up infectious work-up.    acyclovir 10mg/kg q8h   Consider antimicrobials if Neuro status does not improve or clinically Pt declines.               The patient is being Prophylaxed for:  Venous Thromboembolism with: Mechanical or Chemical  Stress Ulcer with: Not Applicable   Ventilator Pneumonia with: not applicable    Activity Orders          Diet Adult Regular (IDDSI Level 7) Thin: Regular starting at 09/17 1849    Straight Cath starting at 09/17 0354        Full Code     I have spent 35 min with this patient, with over 50% of this time spent coordinating care and speaking with the family    Nancy Coelho NP  Neurocritical Care  [facility]

## 2020-09-18 NOTE — PT/OT/SLP PROGRESS
"Physical Therapy Treatment    Patient Name:  Donna Arias   MRN:  [id number]    Recommendations:     Discharge Recommendations:  rehabilitation facility   Discharge Equipment Recommendations: other (see comments)(TBD by next level of care)   Barriers to discharge: Inaccessible home and increased level of assistance    Assessment:     Donna Arias is a 60 y.o. female admitted with a medical diagnosis of New onset seizure with abnormal neurological exam without head trauma.  She presents with the following impairments/functional limitations:  weakness, impaired endurance, impaired self care skills, impaired functional mobilty, gait instability, impaired balance, impaired cognition, decreased lower extremity function, decreased safety awareness. PT recommending inpatient rehab facility upon DC from hospital.    Rehab Prognosis: Good; patient would benefit from acute skilled PT services to address these deficits and reach maximum level of function.    Recent Surgery: * No surgery found *      Plan:     During this hospitalization, patient to be seen 4 x/week to address the identified rehab impairments via gait training, therapeutic activities, therapeutic exercises, neuromuscular re-education and progress toward the following goals:    · Plan of Care Expires:  10/14/20    Subjective     Chief Complaint: confusion  Patient/Family Comments/goals: "i'm confused"  Pain/Comfort:  · Pain Rating 1: 0/10  · Pain Rating Post-Intervention 1: 0/10      Objective:     Communicated with Rn prior to session.  Patient found HOB elevated with bed alarm, blood pressure cuff, telemetry, pulse ox (continuous), peripheral IV, PureWick, oxygen upon PT entry to room.     General Precautions: Standard, fall, seizure   Orthopedic Precautions:N/A   Braces: N/A     Functional Mobility:  · Bed Mobility:     · Rolling Left:  minimum assistance  · Scooting: minimum assistance  · Supine to Sit: minimum assistance  · Sit to Supine: minimum " assistance  · Transfers:     · Sit to Stand:  minimum assistance with no AD and hand-held assist  · Gait: 5 ft forward and backward, 3 steps L, Nikole-modA, no AD, HHA  · Balance: min A-mod A during ambulation      AM-PAC 6 CLICK MOBILITY  Turning over in bed (including adjusting bedclothes, sheets and blankets)?: 3  Sitting down on and standing up from a chair with arms (e.g., wheelchair, bedside commode, etc.): 3  Moving from lying on back to sitting on the side of the bed?: 3  Moving to and from a bed to a chair (including a wheelchair)?: 3  Need to walk in hospital room?: 3  Climbing 3-5 steps with a railing?: 3  Basic Mobility Total Score: 18       Therapeutic Activities and Exercises:   Patient educated on role of therapy, goals of session, and benefits of mobilizing.   Discussed PT plan of care during hospitalization.   Patient educated on calling for assistance.   Patient educated on how their diagnosis impacts their mobility within PT scope of practice.   Communication board up to date.  All questions answered within PT scope of practice.    Patient left HOB elevated with all lines intact, call button in reach, bed alarm on and RN notified.    GOALS:   Multidisciplinary Problems     Physical Therapy Goals        Problem: Physical Therapy Goal    Goal Priority Disciplines Outcome Goal Variances Interventions   Physical Therapy Goal     PT, PT/OT Ongoing, Progressing     Description: Goals to be met by: 2020    Patient will increase functional independence with mobility by performin. Pt will perform bed mobility (rolling L/R, scooting, and bridging) with Nikole. - goal met 2020   Updated: modified independent  2. Pt will perform supine to/from sit with Nikole. - goal met 2020   Updated: modified independent  3. Pt will sit EOB x 15 mins with B UE support with min assistance.  4. Pt will perform sit to stand with min assistance. - goal met 2020   Updated: modified independent  5. Pt will  ambulate 50 feet with min assistance.  6. Pt will perform there-ex from handout x 15 reps to improve strength for functional mobility.  7. Pt will ascend/descend 2 steps with 1 HR and Nikole.                         Time Tracking:     PT Received On: 09/18/20  PT Start Time: 1550     PT Stop Time: 1606  PT Total Time (min): 16 min   Additional staffing present: OT    Billable Minutes: Therapeutic Activity 16    Treatment Type: Treatment  PT/PTA: PT     PTA Visit Number: 0     Pattie Sinha, PT  09/18/2020

## 2020-09-18 NOTE — PT/OT/SLP PROGRESS
"Occupational Therapy   Treatment    Name: Donna Arias  MRN: [id number]  Admitting Diagnosis:  New onset seizure with abnormal neurological exam without head trauma       Recommendations:     Discharge Recommendations: rehabilitation facility  Discharge Equipment Recommendations:  (TBD)  Barriers to discharge:  Decreased caregiver support    Assessment:     Donna Arias is a 60 y.o. female with a medical diagnosis of New onset seizure with abnormal neurological exam without head trauma.  She presents with the following performance deficits affecting function are weakness, impaired endurance, impaired sensation, impaired self care skills, impaired functional mobilty, gait instability, impaired balance, impaired cognition, decreased safety awareness, decreased ROM.     Patient participated well with therapy, continues to present with impaired cognition, safety awareness, and coordination and consequently required mod v/c's through out for attention to task and sequencing. Patient completed steps with moderate and hand held-held assist and bed mobility with minimal assist, perseverated on need for diet sprite and ice cream and stated "I'm confused" upon arrival of therapy. Patient will continue to benefit from intensive and collaborative therapy during this admission and placement in rehabilitation facility to maximize functional independence prior to returning home.     Rehab Prognosis:  Good; patient would benefit from acute skilled OT services to address these deficits and reach maximum level of function.       Plan:     Patient to be seen 4 x/week to address the above listed problems via self-care/home management, therapeutic activities, therapeutic exercises, neuromuscular re-education, cognitive retraining  · Plan of Care Expires: 10/13/20  · Plan of Care Reviewed with: patient    Subjective     "They still have not brought me that snack..."    "Can YOU get me my Ativan?"    Pain/Comfort:  · Pain Rating 1: " "0/10  · Pain Rating Post-Intervention 1: 0/10    Objective:     Communicated with: Nursing prior to session.  Patient found supine with bed alarm, blood pressure cuff, EEG, NG tube, PureWick, peripheral IV, pulse ox (continuous), restraints, telemetry, SCD upon OT entry to room.    General Precautions: Standard, fall, seizure   Orthopedic Precautions:N/A   Braces: N/A     Occupational Performance:     Bed Mobility:    · Patient completed Rolling/Turning to Left with  minimum assistance  · Patient completed Scooting/Bridging with minimum assistance  · Patient completed Supine to Sit with minimum assistance  · Patient completed Sit to Supine with minimum assistance     Functional Mobility/Transfers:  · Patient completed Sit <> Stand Transfer with minimum assistance  with  hand-held assist   · Functional Mobility: Moderate assistance for steps forward <> backward, and to head of bed with hand-held assistance  · Cues to attend to task/sequencing  · Patient perseverating on environmental factors through out, decreased safety awareness and narrow [place]    Activities of Daily Living:  · Grooming: contact guard assistance seated edge of bed  · Upper Body Dressing: minimum assistance with head of bed elevated  · Lower Body Dressing: maximal assistance per patient request and due to impaired safety awareness, patient repeatedly asking "can you pull my socks"      [facility] 6 Click ADL: 15    Treatment & Education:  -Patient preparing to transfer down to floor with nursing, completed ADL's and transfers as outlined above and required consistent and moc v/t cues to attend to task.  -Patient and family educated on roles/goals of OT and POC.  -White board updated.  -Therapist provided time for questions and answered within scope of practice.  -Patient educated on importance of EOB/OOB activity to maximize recovery.    Patient left supine with all lines intact, call button in reach and nursing notifiedEducation:      GOALS: "   Multidisciplinary Problems     Occupational Therapy Goals        Problem: Occupational Therapy Goal    Goal Priority Disciplines Outcome Interventions   Occupational Therapy Goal     OT, PT/OT Ongoing, Progressing    Description: Goals to be met by: 9/28/2020     Patient will increase functional independence with ADLs by performing:    Grooming while EOB with Minimal Assistance.  Toileting from bedside commode with Minimal Assistance for hygiene and clothing management.   Sitting at edge of bed x10 minutes with Contact Guard Assistance while engaging in functional tasks   Stand pivot transfers with Minimal Assistance.  Toilet transfer to bedside commode with Minimal Assistance.                     Time Tracking:     OT Date of Treatment: 09/18/20  OT Start Time: 1550  OT Stop Time: 1607  OT Total Time (min): 17 min    Billable Minutes:Therapeutic Activity 17 minutes    Misty Hearn OT  9/18/2020

## 2020-09-18 NOTE — PLAN OF CARE
Hospital Medicine ICU Acceptance Note    Date of Admit: 9/11/2020  Date of Transfer / Stepdown: 9/18/2020  ICU team stepping patient down: neuro ICU   ICU team member giving verbal handoff  Accepting  team:  Alan    Brief History of Present Illness:     Hospital/ICU Course:     Donna Arias is a 60 y.o. female who  has a past medical history of Anxiety, Depression, and Hypertension.. The patient presented to [facility] on 9/11/2020 with a primary complaint of Seizures (Focal seizure this am per [facility]. Pt is currently PEC'D for hallucinations. No hx of seizures) and Transfer [facility] (need EEG monitor and it was not available at [facility] )     Has been followed by Epilepsy for recurrant seizures that possibly began following head injury 8 weeks ago. Head imaging on admit showed an area of restricted diffusion and edema throughout the right frontal lobe with sulcal effacement. Stroke team was consulted who felt that this was more likely related to seizure process vs infection. LP on 9/13 with 2500 RBC, 5 WBC, Glucose 60, Protein 167. Started empirically on acyclovir for 14 days (end 9/23). AEDs titrated and she is now on Keppra 2g BID, Onfi 20mg BID, Vimpat 200mg BID. Clinically improved and stable for step down to hospital medicine with general neurology consult.     Consultants and Procedures:     Consultants:  Epilepsy     Procedures:    LP     Plan :       Patient seen at bedside.  Doing well. No acute issues.  VSS , no complaints   Order adjusted and corrected       Epilepsy following, current AEDs Keppra 2g BID, Onfi 20mg BID, Vimpat 200mg BID    Right frontal lesion improving on repeat imaging, was on empiric acyclovir- HSV in CSF was negative. And acyclo was stopped on 9/18 by neuro ICU    PT OT - rehab       Signing Physician:     Beverley Phillips MD  Department of Hospital Medicine   [facility]  Pager [phone] [id number]  9/18/2020

## 2020-09-18 NOTE — PLAN OF CARE
[facility] Care Plan    POC reviewed with Donna Arias and family at 0300. Pt verbalized understanding. Questions and concerns addressed. No acute events overnight. Pt progressing toward goals. K replaced. Will continue to monitor. See below and flowsheets for full assessment and VS info.       Neuro:  [place] Coma Scale  Best Eye Response: 4-->(E4) spontaneous  Best Motor Response: 6-->(M6) obeys commands  Best Verbal Response: 5-->(V5) oriented  [place] Coma Scale Score: 15  Assessment Qualifiers: patient not sedated/intubated, no eye obstruction present  Pupil PERRLA: yes     24hr Temp:  [98.7 °F (37.1 °C)-99.6 °F (37.6 °C)]     CV:   Rhythm: sinus tachycardia  BP goals:   SBP < 220  MAP > 65    Resp:   O2 Device (Oxygen Therapy): room air  Oxygen Concentration (%): 24    Plan: N/A    GI/:  AVERY Total Score: 20  Diet/Nutrition Received: clear liquid  Last Bowel Movement: 09/16/20  Voiding Characteristics: external catheter    Intake/Output Summary (Last 24 hours) at 9/18/2020 0723  Last data filed at 9/18/2020 0605  Gross per 24 hour   Intake 2853.75 ml   Output 3350 ml   Net -496.25 ml     Unmeasured Output  Urine Occurrence: 1  Stool Occurrence: 0  Emesis Occurrence: 0  Pad Count: 3    Labs/Accuchecks:  Recent Labs   Lab 09/18/20  0403   WBC 10.85   RBC 3.95*   HGB 11.7*   HCT 35.9*         Recent Labs   Lab 09/18/20  0403      K 3.6   CO2 25      BUN 3*   CREATININE 0.7   ALKPHOS 87   ALT 20   AST 21   BILITOT 0.3      Recent Labs   Lab 09/12/20  1151  09/18/20  0403   INR 1.0   < > 1.0   APTT 26.2  --   --     < > = values in this interval not displayed.      Recent Labs   Lab 09/13/20  0107   CPK 75   CPKMB 1.2   TROPONINI <0.006   MB 1.6       Electrolytes: Electrolytes replaced  Accuchecks: Q6H    Gtts:   sodium chloride 0.9% 75 mL/hr at 09/18/20 0605       LDA/Wounds:  Lines/Drains/Airways       Drain              Female External Urinary Catheter 09/13/20 1700 4 days         NG/OG Tube  09/14/20 2159 nasogastric 14 Fr. Right nostril 3 days              Peripheral Intravenous Line                   Peripheral IV - Single Lumen 09/14/20 1211 20 G Anterior;Right Forearm 3 days         Peripheral IV - Single Lumen 09/14/20 1334 18 G Right Antecubital 3 days                  Wounds: Yes  Wound care consulted: Yes

## 2020-09-18 NOTE — ASSESSMENT & PLAN NOTE
"- Patient reports intermittent hallucinations prior to current hospitalization, "seeing a person in the next room"  - Follow up CSF results, infectious workup. No further noted hallucinations noted, possibly related to seizures  "

## 2020-09-18 NOTE — SUBJECTIVE & OBJECTIVE
Interval History: No clinical events overnight concerning for seizure, stable for stepdown to  today. Will arrange outpatient follow up in Epilepsy clinic.    Current Facility-Administered Medications   Medication Dose Route Frequency Provider Last Rate Last Dose    0.9%  NaCl infusion   Intravenous Continuous Andres Clements NP 75 mL/hr at 09/18/20 1305      acetaminophen suppository 650 mg  650 mg Rectal Q6H PRN Andres Clements NP        acetaminophen tablet 650 mg  650 mg Per NG tube Q4H PRN Balaji Kathleen MD   650 mg at 09/18/20 0412    acyclovir (ZOVIRAX) 500 mg in dextrose 5 % 100 mL IVPB  10 mg/kg (Ideal) Intravenous Q8H Luigi Andrews  mL/hr at 09/18/20 1503 500 mg at 09/18/20 1503    aspirin chewable tablet 81 mg  81 mg Per NG tube Daily Luigi Andrews MD   81 mg at 09/18/20 0815    atorvastatin tablet 40 mg  40 mg Per NG tube Daily Luigi Andrews MD   40 mg at 09/18/20 0815    calcium gluconate 1g in dextrose 5% 100mL (ready to mix system)  1 g Intravenous PRN Gregory Ortega MD        cloBAZam Tab 20 mg  20 mg Per NG tube BID Luigi Andrews MD   20 mg at 09/18/20 0815    docusate 50 mg/5 mL liquid 100 mg  100 mg Per NG tube Daily Luigi Andrews MD   100 mg at 09/18/20 0815    heparin (porcine) injection 5,000 Units  5,000 Units Subcutaneous Q8H Andres PJuan Carlos Clements NP   5,000 Units at 09/18/20 1454    labetalol 20 mg/4 mL (5 mg/mL) IV syring  10 mg Intravenous Q4H PRN Andres Clements NP        lacosamide (VIMPAT) 200 mg in sodium chloride 0.9% 100 mL IVPB  200 mg Intravenous Q12H Casandra Noyola  mL/hr at 09/18/20 1146 200 mg at 09/18/20 1146    levETIRAcetam in NaCl (iso-os) IVPB 1,000 mg  1,000 mg Intravenous Q12H Luigi Andrews MD        lorazepam injection 2 mg  2 mg Intravenous Q1H PRN Gregory Ortega MD        magnesium oxide tablet 800 mg  800 mg Per NG tube PRN Balaji Kathleen MD        magnesium oxide tablet 800 mg   800 mg Per NG tube PRN Balaji Kathleen MD        magnesium sulfate 2g in water 50mL IVPB (premix)  2 g Intravenous PRN Gregory Ortega MD   2 g at 09/15/20 0312    magnesium sulfate 2g in water 50mL IVPB (premix)  4 g Intravenous PRN Gregory Ortega MD        multivitamin tablet 1 tablet  1 tablet Per NG tube Daily Luigi Andrews MD   1 tablet at 09/18/20 0815    ondansetron injection 4 mg  4 mg Intravenous Q6H PRN Andres Clements NP   4 mg at 09/16/20 1928    pantoprazole EC tablet 40 mg  40 mg Oral BID AC Luigi Andrews MD        potassium chloride packet 40 mEq  40 mEq Per NG tube PRN Balaji Kathleen MD   40 mEq at 09/18/20 0514    potassium chloride packet 40 mEq  40 mEq Per NG tube PRN Balaji Kathelen MD        potassium chloride packet 60 mEq  60 mEq Per NG tube PRN Balaji Kathleen MD        potassium, sodium phosphates 280-160-250 mg packet 2 packet  2 packet Per NG tube PRN Balaji Kathleen MD        potassium, sodium phosphates 280-160-250 mg packet 2 packet  2 packet Per NG tube PRN Balaji Kathleen MD        potassium, sodium phosphates 280-160-250 mg packet 2 packet  2 packet Per NG tube PRN Balaji aKthleen MD        sertraline tablet 25 mg  25 mg Per NG tube Daily Luigi Andrews MD   25 mg at 09/18/20 0815    silodosin capsule 4 mg  4 mg Per NG tube Daily Nancy Coelho NP   4 mg at 09/18/20 0815    simethicone chewable tablet 80 mg  1 tablet Per NG tube TID PRN Cyn Elizalde PA-C        sodium chloride 0.9% flush 10 mL  10 mL Intravenous PRN Ingrid Hughes MD        sodium chloride 0.9% flush 3 mL  3 mL Intravenous Q8H Andres Clements NP 0 mL/hr at 09/13/20 0924 3 mL at 09/18/20 1457    sodium phosphate 15 mmol in dextrose 5 % 250 mL IVPB  15 mmol Intravenous PRN Gregory Ortega MD        sodium phosphate 20.01 mmol in dextrose 5 % 250 mL IVPB  20.01 mmol Intravenous PRN Gregory Ortega MD        sodium phosphate 30 mmol in  dextrose 5 % 250 mL IVPB  30 mmol Intravenous PRN Gregory Ortega MD         Continuous Infusions:   sodium chloride 0.9% 75 mL/hr at 09/18/20 1305       Review of Systems   Constitutional: Negative for chills and fever.   Eyes: Negative for photophobia and redness.   Respiratory: Negative for cough and shortness of breath.    Cardiovascular: Negative for chest pain and leg swelling.   Gastrointestinal: Negative for nausea and vomiting.   Musculoskeletal: Positive for neck pain. Negative for neck stiffness.   Skin: Negative for pallor.   Neurological: Positive for seizures. Negative for dizziness, speech difficulty, weakness and headaches.   Psychiatric/Behavioral: Positive for confusion. Negative for agitation. The patient is not hyperactive.      Objective:     Vital Signs (Most Recent):  Temp: 99.4 °F (37.4 °C) (09/18/20 1105)  Pulse: 106 (09/18/20 1305)  Resp: (!) 31 (09/18/20 1305)  BP: 124/72 (09/18/20 1305)  SpO2: 95 % (09/18/20 1305) Vital Signs (24h Range):  Temp:  [98.8 °F (37.1 °C)-99.6 °F (37.6 °C)] 99.4 °F (37.4 °C)  Pulse:  [] 106  Resp:  [21-36] 31  SpO2:  [92 %-99 %] 95 %  BP: (124-198)/() 124/72     Weight: 74.9 kg (165 lb 2 oz)  Body mass index is 30.2 kg/m².    Physical Exam  Vitals signs and nursing note reviewed.   Constitutional:       Appearance: Normal appearance. She is normal weight.   HENT:      Head: Normocephalic.   Eyes:      Extraocular Movements: Extraocular movements intact.      Conjunctiva/sclera: Conjunctivae normal.      Pupils: Pupils are equal, round, and reactive to light.   Neck:      Musculoskeletal: Normal range of motion and neck supple. No neck rigidity.   Cardiovascular:      Pulses: Normal pulses.   Pulmonary:      Effort: Pulmonary effort is normal. No respiratory distress.   Musculoskeletal:         General: No swelling or deformity.   Skin:     General: Skin is warm and dry.   Neurological:      Mental Status: She is oriented to person, place, and  time.   Psychiatric:         Speech: Speech normal.         NEUROLOGICAL EXAMINATION:     MENTAL STATUS   Oriented to person, place, and time.   Speech: speech is normal   Level of consciousness: alert       Alert, oriented to person, place, date. Interactive, participates in exam. Some delayed calculations, perseverative thoughts.     CRANIAL NERVES     CN III, IV, VI   Pupils are equal, round, and reactive to light.    CN VII   Facial expression full, symmetric.   Left facial weakness: mild left lower facial weakness.    CN VIII   CN VIII normal.     CN XI   CN XI normal.     MOTOR EXAM   Muscle bulk: normal  Overall muscle tone: normal       Moves all extremities spontaneously and symmetrically, follows commands        Significant Labs: All pertinent lab results from the past 24 hours have been reviewed.    Significant Studies: I have reviewed all pertinent imaging results/findings within the past 24 hours.

## 2020-09-18 NOTE — ASSESSMENT & PLAN NOTE
- MRI brain 9/12/20 showed restricted diffusion and edema throughout the right frontal lobe with sulcal effacement.  Findings concerning for acute infarction in the right CESILIA territory  - Repeat MRI brain 9/17 showing interval resolution of the cortical diffusion restriction present throughout the right frontal lobe

## 2020-09-18 NOTE — ASSESSMENT & PLAN NOTE
-- Continue Neuro checks q 1hr  -- Epilepsy Service following recommend 2 mg of ativan as needed if Pt has catatonic like seizure.   -- Seizure Precautions  -- Continue vEEG shows fluctuating epileptiform activity  -9/17 removed EEG. For imaging. Epilepsy giving pt a break from EEG  -- Continue cloBAZam Tab 10 mg bid                     Vimpat 200 mg bid                     Keppra 1000 mg bid  -- concern for encephalitis due to infectious etiology- LP performed 9/13/20. Follow up infectious work-up.    acyclovir 10mg/kg q8h   Consider antimicrobials if Neuro status does not improve or clinically Pt declines.
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
## 2020-09-18 NOTE — NURSING TRANSFER
Nursing Transfer Note      9/18/2020     Transferred to 957 A from 9087    Transfer via bed    Transfer with cardiac monitoring    Transported by Ravin, RN and Pattie RN    Medicines sent: Yes (Depacon IVPB)    Chart send with patient: Yes    Patient reassessed at: 1630 9/18/20    Upon arrival to floor: cardiac monitor applied, patient oriented to room, call bell in reach and bed in lowest position

## 2020-09-19 LAB
BACTERIA CSF CULT: NO GROWTH
GRAM STN SPEC: NORMAL
GRAM STN SPEC: NORMAL
POCT GLUCOSE: 107 MG/DL (ref 70–110)
POCT GLUCOSE: 122 MG/DL (ref 70–110)

## 2020-09-19 PROCEDURE — 99232 PR SUBSEQUENT HOSPITAL CARE,LEVL II: ICD-10-PCS | Mod: ,,, | Performed by: HOSPITALIST

## 2020-09-19 PROCEDURE — 25000003 PHARM REV CODE 250: Performed by: PHYSICIAN ASSISTANT

## 2020-09-19 PROCEDURE — 99232 SBSQ HOSP IP/OBS MODERATE 35: CPT | Mod: ,,, | Performed by: HOSPITALIST

## 2020-09-19 PROCEDURE — 25000003 PHARM REV CODE 250: Performed by: STUDENT IN AN ORGANIZED HEALTH CARE EDUCATION/TRAINING PROGRAM

## 2020-09-19 PROCEDURE — 25000003 PHARM REV CODE 250: Performed by: NURSE PRACTITIONER

## 2020-09-19 PROCEDURE — A4216 STERILE WATER/SALINE, 10 ML: HCPCS | Performed by: EMERGENCY MEDICINE

## 2020-09-19 PROCEDURE — 63600175 PHARM REV CODE 636 W HCPCS: Performed by: HOSPITALIST

## 2020-09-19 PROCEDURE — 11000001 HC ACUTE MED/SURG PRIVATE ROOM

## 2020-09-19 PROCEDURE — 25000003 PHARM REV CODE 250: Performed by: HOSPITALIST

## 2020-09-19 PROCEDURE — 27000221 HC OXYGEN, UP TO 24 HOURS

## 2020-09-19 PROCEDURE — 25000003 PHARM REV CODE 250: Performed by: EMERGENCY MEDICINE

## 2020-09-19 PROCEDURE — 94761 N-INVAS EAR/PLS OXIMETRY MLT: CPT

## 2020-09-19 PROCEDURE — A4216 STERILE WATER/SALINE, 10 ML: HCPCS | Performed by: NURSE PRACTITIONER

## 2020-09-19 PROCEDURE — 63600175 PHARM REV CODE 636 W HCPCS: Performed by: STUDENT IN AN ORGANIZED HEALTH CARE EDUCATION/TRAINING PROGRAM

## 2020-09-19 RX ORDER — CALCIUM CARBONATE 200(500)MG
500 TABLET,CHEWABLE ORAL DAILY PRN
Status: DISCONTINUED | OUTPATIENT
Start: 2020-09-19 | End: 2020-09-24 | Stop reason: HOSPADM

## 2020-09-19 RX ADMIN — Medication 3 ML: at 01:09

## 2020-09-19 RX ADMIN — CLOBAZAM 20 MG: 10 TABLET ORAL at 08:09

## 2020-09-19 RX ADMIN — ASPIRIN 81 MG CHEWABLE TABLET 81 MG: 81 TABLET CHEWABLE at 08:09

## 2020-09-19 RX ADMIN — Medication 3 ML: at 02:09

## 2020-09-19 RX ADMIN — ATORVASTATIN CALCIUM 40 MG: 20 TABLET, FILM COATED ORAL at 08:09

## 2020-09-19 RX ADMIN — SIMETHICONE CHEW TAB 80 MG 80 MG: 80 TABLET ORAL at 01:09

## 2020-09-19 RX ADMIN — THERA TABS 1 TABLET: TAB at 08:09

## 2020-09-19 RX ADMIN — SIMETHICONE CHEW TAB 80 MG 80 MG: 80 TABLET ORAL at 08:09

## 2020-09-19 RX ADMIN — Medication 3 ML: at 08:09

## 2020-09-19 RX ADMIN — LEVETIRACETAM INJECTION 1000 MG: 10 INJECTION INTRAVENOUS at 08:09

## 2020-09-19 RX ADMIN — LACOSAMIDE 200 MG: 50 TABLET, FILM COATED ORAL at 08:09

## 2020-09-19 RX ADMIN — ENOXAPARIN SODIUM 40 MG: 40 INJECTION SUBCUTANEOUS at 04:09

## 2020-09-19 RX ADMIN — CALCIUM CARBONATE (ANTACID) CHEW TAB 500 MG 500 MG: 500 CHEW TAB at 04:09

## 2020-09-19 RX ADMIN — LACOSAMIDE 200 MG: 50 TABLET, FILM COATED ORAL at 12:09

## 2020-09-19 RX ADMIN — Medication 10 ML: at 01:09

## 2020-09-19 RX ADMIN — SERTRALINE HYDROCHLORIDE 25 MG: 25 TABLET ORAL at 08:09

## 2020-09-19 RX ADMIN — LACOSAMIDE 200 MG: 50 TABLET, FILM COATED ORAL at 01:09

## 2020-09-19 RX ADMIN — LEVETIRACETAM 500 MG: 500 TABLET ORAL at 08:09

## 2020-09-19 NOTE — NURSING
The patient was transferred to NPU~1700. Vitals and assessment are stable. Will watch for epileptic events.

## 2020-09-19 NOTE — PROGRESS NOTES
Pt awake,alert,verbally responsive.No distress noted,breathing unlabored. No distress noted,breathing unlabored.Denies any pain or discomfort at this time.Pt keeps saying that she is ready to go home.No issues noted this shift.Wll continue to monitor.

## 2020-09-20 LAB
POCT GLUCOSE: 120 MG/DL (ref 70–110)
POCT GLUCOSE: 123 MG/DL (ref 70–110)
POCT GLUCOSE: 143 MG/DL (ref 70–110)

## 2020-09-20 PROCEDURE — 25000003 PHARM REV CODE 250: Performed by: NURSE PRACTITIONER

## 2020-09-20 PROCEDURE — 11000001 HC ACUTE MED/SURG PRIVATE ROOM

## 2020-09-20 PROCEDURE — 63600175 PHARM REV CODE 636 W HCPCS: Performed by: HOSPITALIST

## 2020-09-20 PROCEDURE — 99232 SBSQ HOSP IP/OBS MODERATE 35: CPT | Mod: ,,, | Performed by: HOSPITALIST

## 2020-09-20 PROCEDURE — 25000003 PHARM REV CODE 250: Performed by: HOSPITALIST

## 2020-09-20 PROCEDURE — A4216 STERILE WATER/SALINE, 10 ML: HCPCS | Performed by: NURSE PRACTITIONER

## 2020-09-20 PROCEDURE — 99232 PR SUBSEQUENT HOSPITAL CARE,LEVL II: ICD-10-PCS | Mod: ,,, | Performed by: HOSPITALIST

## 2020-09-20 RX ORDER — ALPRAZOLAM 0.25 MG/1
0.25 TABLET ORAL 2 TIMES DAILY PRN
Status: DISCONTINUED | OUTPATIENT
Start: 2020-09-20 | End: 2020-09-24 | Stop reason: HOSPADM

## 2020-09-20 RX ADMIN — LEVETIRACETAM 500 MG: 500 TABLET ORAL at 09:09

## 2020-09-20 RX ADMIN — ALPRAZOLAM 0.25 MG: 0.25 TABLET ORAL at 11:09

## 2020-09-20 RX ADMIN — LACOSAMIDE 200 MG: 50 TABLET, FILM COATED ORAL at 11:09

## 2020-09-20 RX ADMIN — CLOBAZAM 20 MG: 10 TABLET ORAL at 09:09

## 2020-09-20 RX ADMIN — LACOSAMIDE 200 MG: 50 TABLET, FILM COATED ORAL at 09:09

## 2020-09-20 RX ADMIN — Medication 3 ML: at 09:09

## 2020-09-20 RX ADMIN — Medication 3 ML: at 02:09

## 2020-09-20 RX ADMIN — ENOXAPARIN SODIUM 40 MG: 40 INJECTION SUBCUTANEOUS at 05:09

## 2020-09-20 RX ADMIN — SERTRALINE HYDROCHLORIDE 25 MG: 25 TABLET ORAL at 09:09

## 2020-09-20 NOTE — PLAN OF CARE
Problem: Adult Inpatient Plan of Care  Goal: Plan of Care Review  Outcome: Ongoing, Progressing     Pt repeats herself often, focus problems noted.  Anxious and calls for staff often.  Needs attention and reasurance.  Patient had a 20 second seizure.  MD reordered EEG.  Will continue to monitor

## 2020-09-20 NOTE — NURSING
EMU SEIZURE EVENT NOTE  Time event started: 11:25  Duration: 20 Seconds   Describe symptoms during event and post-ictal symptoms:  While pt was talking to nurse, she got stuck on a word and reapeated like a skipping record player, O2Sat 89% during event.  Pt responded after 20 sec and continued conversation right where she left off. O2 Sat increased  98%.    Who notified: Beverley Phillips MD  Intervention:Standardized neurological assessment completed.Patient response: Pt was unaware of event.  Neurological assessment back at baseline, VSS at this time. See flowsheets for more event information.
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
## 2020-09-21 ENCOUNTER — TELEPHONE (OUTPATIENT)
Dept: PHARMACY | Facility: CLINIC | Age: 60
End: 2020-09-21

## 2020-09-21 LAB
ALBUMIN SERPL BCP-MCNC: 3.2 G/DL (ref 3.5–5.2)
ALP SERPL-CCNC: 77 U/L (ref 55–135)
ALT SERPL W/O P-5'-P-CCNC: 17 U/L (ref 10–44)
ANION GAP SERPL CALC-SCNC: 11 MMOL/L (ref 8–16)
AST SERPL-CCNC: 16 U/L (ref 10–40)
BILIRUB SERPL-MCNC: 0.2 MG/DL (ref 0.1–1)
BUN SERPL-MCNC: 12 MG/DL (ref 6–20)
CALCIUM SERPL-MCNC: 8.9 MG/DL (ref 8.7–10.5)
CHLORIDE SERPL-SCNC: 100 MMOL/L (ref 95–110)
CO2 SERPL-SCNC: 24 MMOL/L (ref 23–29)
CREAT SERPL-MCNC: 0.7 MG/DL (ref 0.5–1.4)
EST. GFR  (AFRICAN AMERICAN): >60 ML/MIN/1.73 M^2
EST. GFR  (NON AFRICAN AMERICAN): >60 ML/MIN/1.73 M^2
GLUCOSE SERPL-MCNC: 111 MG/DL (ref 70–110)
PHOSPHATIDYLETHANOL (PETH): 43 NG/ML
POCT GLUCOSE: 114 MG/DL (ref 70–110)
POTASSIUM SERPL-SCNC: 3.2 MMOL/L (ref 3.5–5.1)
PROT SERPL-MCNC: 6.4 G/DL (ref 6–8.4)
SODIUM SERPL-SCNC: 135 MMOL/L (ref 136–145)
VIT B1 BLD-MCNC: 51 UG/L (ref 38–122)

## 2020-09-21 PROCEDURE — 80053 COMPREHEN METABOLIC PANEL: CPT

## 2020-09-21 PROCEDURE — A4216 STERILE WATER/SALINE, 10 ML: HCPCS | Performed by: NURSE PRACTITIONER

## 2020-09-21 PROCEDURE — 94761 N-INVAS EAR/PLS OXIMETRY MLT: CPT

## 2020-09-21 PROCEDURE — 36415 COLL VENOUS BLD VENIPUNCTURE: CPT

## 2020-09-21 PROCEDURE — 99232 SBSQ HOSP IP/OBS MODERATE 35: CPT | Mod: ,,, | Performed by: HOSPITALIST

## 2020-09-21 PROCEDURE — 97530 THERAPEUTIC ACTIVITIES: CPT

## 2020-09-21 PROCEDURE — 63600175 PHARM REV CODE 636 W HCPCS: Performed by: HOSPITALIST

## 2020-09-21 PROCEDURE — 25000003 PHARM REV CODE 250: Performed by: NURSE PRACTITIONER

## 2020-09-21 PROCEDURE — 99232 PR SUBSEQUENT HOSPITAL CARE,LEVL II: ICD-10-PCS | Mod: ,,, | Performed by: HOSPITALIST

## 2020-09-21 PROCEDURE — 97116 GAIT TRAINING THERAPY: CPT

## 2020-09-21 PROCEDURE — 25000003 PHARM REV CODE 250: Performed by: HOSPITALIST

## 2020-09-21 PROCEDURE — 11000001 HC ACUTE MED/SURG PRIVATE ROOM

## 2020-09-21 RX ORDER — QUETIAPINE FUMARATE 100 MG/1
100 TABLET, FILM COATED ORAL NIGHTLY
Status: ON HOLD | COMMUNITY
Start: 2020-09-08 | End: 2020-09-23

## 2020-09-21 RX ORDER — CLOBAZAM 20 MG/1
20 TABLET ORAL 2 TIMES DAILY
Qty: 60 TABLET | Refills: 2 | Status: SHIPPED | OUTPATIENT
Start: 2020-09-21 | End: 2020-09-22 | Stop reason: HOSPADM

## 2020-09-21 RX ORDER — QUETIAPINE FUMARATE 25 MG/1
100 TABLET, FILM COATED ORAL NIGHTLY
Status: DISCONTINUED | OUTPATIENT
Start: 2020-09-21 | End: 2020-09-22

## 2020-09-21 RX ORDER — ALPRAZOLAM 1 MG/1
1 TABLET ORAL 2 TIMES DAILY
Status: ON HOLD | COMMUNITY
Start: 2020-07-30 | End: 2020-09-24 | Stop reason: HOSPADM

## 2020-09-21 RX ORDER — VENLAFAXINE HYDROCHLORIDE 150 MG/1
150 TABLET, EXTENDED RELEASE ORAL DAILY
COMMUNITY

## 2020-09-21 RX ORDER — DICLOFENAC SODIUM 10 MG/G
2-4 GEL TOPICAL 4 TIMES DAILY
Status: ON HOLD | COMMUNITY
Start: 2020-08-10 | End: 2020-09-24 | Stop reason: SDUPTHER

## 2020-09-21 RX ORDER — LACOSAMIDE 200 MG/1
200 TABLET ORAL EVERY 12 HOURS
Qty: 60 TABLET | Refills: 2 | Status: SHIPPED | OUTPATIENT
Start: 2020-09-21 | End: 2020-09-24 | Stop reason: SDUPTHER

## 2020-09-21 RX ORDER — VENLAFAXINE HYDROCHLORIDE 150 MG/1
150 CAPSULE, EXTENDED RELEASE ORAL DAILY
Status: DISCONTINUED | OUTPATIENT
Start: 2020-09-21 | End: 2020-09-22

## 2020-09-21 RX ORDER — POTASSIUM CHLORIDE 20 MEQ/1
40 TABLET, EXTENDED RELEASE ORAL ONCE
Status: COMPLETED | OUTPATIENT
Start: 2020-09-21 | End: 2020-09-21

## 2020-09-21 RX ADMIN — QUETIAPINE FUMARATE 100 MG: 25 TABLET ORAL at 09:09

## 2020-09-21 RX ADMIN — Medication 3 ML: at 02:09

## 2020-09-21 RX ADMIN — ALPRAZOLAM 0.25 MG: 0.25 TABLET ORAL at 09:09

## 2020-09-21 RX ADMIN — POTASSIUM CHLORIDE 40 MEQ: 1500 TABLET, EXTENDED RELEASE ORAL at 09:09

## 2020-09-21 RX ADMIN — LACOSAMIDE 200 MG: 50 TABLET, FILM COATED ORAL at 10:09

## 2020-09-21 RX ADMIN — LACOSAMIDE 200 MG: 50 TABLET, FILM COATED ORAL at 09:09

## 2020-09-21 RX ADMIN — SERTRALINE HYDROCHLORIDE 25 MG: 25 TABLET ORAL at 09:09

## 2020-09-21 RX ADMIN — ENOXAPARIN SODIUM 40 MG: 40 INJECTION SUBCUTANEOUS at 05:09

## 2020-09-21 RX ADMIN — CLOBAZAM 20 MG: 10 TABLET ORAL at 09:09

## 2020-09-21 RX ADMIN — VENLAFAXINE HYDROCHLORIDE 150 MG: 150 CAPSULE, EXTENDED RELEASE ORAL at 10:09

## 2020-09-21 NOTE — PT/OT/SLP PROGRESS
Physical Therapy Treatment    Patient Name:  Donna Arias   MRN:  [id number]  Admitting Diagnosis: Seizure disorder  Recent Surgery: * No surgery found *      Recommendations:     Discharge Recommendations:  rehabilitation facility   Discharge Equipment Recommendations: (TBD pending progress with mobility)   Barriers to discharge: Inaccessible home and Decreased caregiver support    Assessment:     Donna Arias is a 60 y.o. female admitted with a medical diagnosis of Seizure disorder. Patient tolerated PT treatment well today. She most limited today by decreased balance.  She was able to move from supine to sitting with moderate assistance, then stand, transfer, and ambulate a short distance with minimal assistance. Pt perseverated on certain phrases and repeated herself throughout the session. We had a long discussion about the benefits of going to rehab, and she was eventually in agreement. Focus of treatment was improving upright mobility. Pt is progressing well. See detailed treatment note below:    Problem List: weakness, impaired endurance, impaired self care skills, impaired functional mobilty, gait instability, impaired balance, impaired cognition, decreased safety awareness. weakness, impaired endurance, impaired self care skills, impaired functional mobilty, gait instability, impaired balance, impaired cognition, decreased safety awareness  Rehab Prognosis: Good     GOALS:   Multidisciplinary Problems     Physical Therapy Goals        Problem: Physical Therapy Goal    Goal Priority Disciplines Outcome Goal Variances Interventions   Physical Therapy Goal     PT, PT/OT Ongoing, Progressing     Description: Goals to be met by: 2020    Patient will increase functional independence with mobility by performin. Pt will perform bed mobility (rolling L/R, scooting, and bridging) with Nikole. - goal met 2020   Updated: modified independent  2. Pt will perform supine to/from sit with Nikole. - goal met  "9/16/2020   Updated: modified independent  3. Pt will sit EOB x 15 mins with B UE support with min assistance.  4. Pt will perform sit to stand with min assistance. - goal met 9/16/2020   Updated: modified independent  5. Pt will ambulate 50 feet with min assistance.  6. Pt will perform there-ex from handout x 15 reps to improve strength for functional mobility.  7. Pt will ascend/descend 2 steps with 1 HR and Nikole.                 Plan:     During this hospitalization, patient to be seen 4 x/week to address the listed problems via gait training, therapeutic activities, therapeutic exercises, neuromuscular re-education  · Plan of Care Expires:  10/14/20   Plan of Care Reviewed with: patient    Subjective     Communicated with RN prior to session.  Patient found supine upon PT entry to room.   "that's the thing about seizures, you never know when you will have one"    Pain/Comfort:  · Pain Rating 1: 0/10  · Pain Rating Post-Intervention 1: 0/10    Objective:     Patient found with: SCD, PureWick   Mental Status: Patient is Alert, Cooperative and Confused during session.    General Precautions: Standard, fall, seizure   Orthopedic Precautions:N/A   Braces: N/A   Respiratory Status: room air  Vital Signs (Most Recent):    Temp: 97.9 °F (36.6 °C) (09/21/20 0739)  Pulse: (!) 112 (09/21/20 0739)  Resp: 20 (09/21/20 0739)  BP: (!) 175/85 (09/21/20 0739)  SpO2: 96 % (09/21/20 0739)    Functional Mobility:  Bed Mobility:   · Rolling/Turning to Left: minimum assistance  · Rolling/Turning to Right: minimum assistance  · Supine to Sit: moderate assistance  · Scooting anteriorly to EOB to have both feet planted on floor: minimum assistance    Sitting Balance at Edge of Bed:  · Assistance Level Required: contact guard for safety    Transfers:   · Sit <> Stand Transfer: minimum assistance with hand-held assist   · Bed <> Chair Transfer: Stand Pivot technique with minimum assistance with hand-held assist      Gait:  · Patient " ambulated: 20 feet   · Patient required: minimal assist  · Patient used:  No Assistive Device  · Gait Deviation(s): narrow base of support, flexed posture, decreased safety awareness    Education:  Patient was educated on the following:   Progress of PT goals and plan of care   In room safety and use of call button   Importance of continued upright mobility and exercise   Benefits of inpatient rehab    Patient left up in chair with all lines intact, call button in reach, and RN notified.    AM-PAC 6 CLICK MOBILITY  Turning over in bed (including adjusting bedclothes, sheets and blankets)?: 3  Sitting down on and standing up from a chair with arms (e.g., wheelchair, bedside commode, etc.): 3  Moving from lying on back to sitting on the side of the bed?: 2  Moving to and from a bed to a chair (including a wheelchair)?: 3  Need to walk in hospital room?: 3  Climbing 3-5 steps with a railing?: 2  Basic Mobility Total Score: 16       Time Tracking:     PT Received On: 09/21/20  PT Start Time: 1445     PT Stop Time: 1516  PT Total Time (min): 31 min     Billable Minutes:   · Gait Training 16 and Therapeutic Activity 15    Treatment Type: Treatment  PT/PTA: PT       Leticia Mehta, PT, DPT  09/21/2020
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
## 2020-09-21 NOTE — NURSING
Patient A&O x 4. Appears to be in good spirits but is wanting to be discharged and go home. Dysphemia noted during speech. No focal motor deficits noted otherwise. Overall good night shift with fair to moderate sleep.

## 2020-09-21 NOTE — PLAN OF CARE
Problem: Physical Therapy Goal  Goal: Physical Therapy Goal  Description: Goals to be met by: 2020    Patient will increase functional independence with mobility by performin. Pt will perform bed mobility (rolling L/R, scooting, and bridging) with Nikole. - goal met 2020   Updated: modified independent  2. Pt will perform supine to/from sit with Nikole. - goal met 2020   Updated: modified independent  3. Pt will sit EOB x 15 mins with B UE support with min assistance.  4. Pt will perform sit to stand with min assistance. - goal met 2020   Updated: modified independent  5. Pt will ambulate 50 feet with min assistance.  6. Pt will perform there-ex from handout x 15 reps to improve strength for functional mobility.  7. Pt will ascend/descend 2 steps with 1 HR and Nikole.  Outcome: Ongoing, Progressing     Pt's goals remain appropriate and pt will continue to benefit from skilled PT services to work towards improved functional mobility.    Leticia Mehta, PT, DPT  2020
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
## 2020-09-21 NOTE — PLAN OF CARE
09/21/20 1358   Post-Acute Status   Post-Acute Authorization Placement   Post-Acute Placement Status Referrals Sent       Referrals sent to:    1. [facility]: Sent referral  2. [facility]: Declined  3. [facility]: Called awaiting return call  4. [facility]: declined      Awaiting acceptance.  SW in contact with CM and Medical staff. Will continue to follow and offer support as needed.     Ravindra Bailey, GOMEZ  [facility]   Ext. [phone]

## 2020-09-21 NOTE — PLAN OF CARE
Problem: Adult Inpatient Plan of Care  Goal: Plan of Care Review  Outcome: Ongoing, Progressing     Pt tearful, continually repeats statements.  Very talkative.  Verbalized desire to be discharged multiple times.  Notified MD.  MD wants to continue to monitor until signs of improvement.

## 2020-09-22 PROBLEM — G40.901 STATUS EPILEPTICUS: Status: ACTIVE | Noted: 2020-09-22

## 2020-09-22 LAB
ALBUMIN SERPL BCP-MCNC: 3.1 G/DL (ref 3.5–5.2)
ALP SERPL-CCNC: 70 U/L (ref 55–135)
ALT SERPL W/O P-5'-P-CCNC: 17 U/L (ref 10–44)
AMPHIPHYSIN AB TITR SER: NEGATIVE TITER
ANION GAP SERPL CALC-SCNC: 9 MMOL/L (ref 8–16)
AST SERPL-CCNC: 17 U/L (ref 10–40)
BASOPHILS # BLD AUTO: 0.06 K/UL (ref 0–0.2)
BASOPHILS NFR BLD: 0.7 % (ref 0–1.9)
BILIRUB SERPL-MCNC: 0.2 MG/DL (ref 0.1–1)
BUN SERPL-MCNC: 13 MG/DL (ref 6–20)
CALCIUM SERPL-MCNC: 8.9 MG/DL (ref 8.7–10.5)
CHLORIDE SERPL-SCNC: 102 MMOL/L (ref 95–110)
CO2 SERPL-SCNC: 26 MMOL/L (ref 23–29)
CREAT SERPL-MCNC: 0.6 MG/DL (ref 0.5–1.4)
CV2 IGG TITR SER: NEGATIVE TITER
DIFFERENTIAL METHOD: ABNORMAL
EOSINOPHIL # BLD AUTO: 0.3 K/UL (ref 0–0.5)
EOSINOPHIL NFR BLD: 3.7 % (ref 0–8)
ERYTHROCYTE [DISTWIDTH] IN BLOOD BY AUTOMATED COUNT: 15 % (ref 11.5–14.5)
EST. GFR  (AFRICAN AMERICAN): >60 ML/MIN/1.73 M^2
EST. GFR  (NON AFRICAN AMERICAN): >60 ML/MIN/1.73 M^2
GLIAL NUC TYPE 1 AB TITR SER: NEGATIVE TITER
GLUCOSE SERPL-MCNC: 101 MG/DL (ref 70–110)
HCT VFR BLD AUTO: 34.2 % (ref 37–48.5)
HGB BLD-MCNC: 10.7 G/DL (ref 12–16)
HU1 AB TITR SER: NEGATIVE TITER
HU2 AB TITR SER IF: NEGATIVE TITER
HU3 AB TITR SER: NEGATIVE TITER
IMM GRANULOCYTES # BLD AUTO: 0.03 K/UL (ref 0–0.04)
IMM GRANULOCYTES NFR BLD AUTO: 0.3 % (ref 0–0.5)
IMMUNOLOGIST REVIEW: NORMAL
LYMPHOCYTES # BLD AUTO: 2 K/UL (ref 1–4.8)
LYMPHOCYTES NFR BLD: 22.6 % (ref 18–48)
MCH RBC QN AUTO: 29.5 PG (ref 27–31)
MCHC RBC AUTO-ENTMCNC: 31.3 G/DL (ref 32–36)
MCV RBC AUTO: 94 FL (ref 82–98)
MONOCYTES # BLD AUTO: 0.7 K/UL (ref 0.3–1)
MONOCYTES NFR BLD: 7.5 % (ref 4–15)
NACHR AB SER-SCNC: 0 NMOL/L
NEUTROPHILS # BLD AUTO: 5.6 K/UL (ref 1.8–7.7)
NEUTROPHILS NFR BLD: 65.2 % (ref 38–73)
NRBC BLD-RTO: 0 /100 WBC
PAVAL REFLEX TEST ADDED: NORMAL
PCA-1 AB TITR SER: NEGATIVE TITER
PCA-2 AB TITR SER: NEGATIVE TITER
PCA-TR AB TITR SER: NEGATIVE TITER
PLATELET # BLD AUTO: 358 K/UL (ref 150–350)
PMV BLD AUTO: 9.1 FL (ref 9.2–12.9)
POTASSIUM SERPL-SCNC: 3.7 MMOL/L (ref 3.5–5.1)
PROT SERPL-MCNC: 6.2 G/DL (ref 6–8.4)
RBC # BLD AUTO: 3.63 M/UL (ref 4–5.4)
SODIUM SERPL-SCNC: 137 MMOL/L (ref 136–145)
STRIA MUS AB TITR SER: NEGATIVE TITER
VGCC-N BIND AB SER-SCNC: 0 NMOL/L
VGCC-P/Q BIND AB SER-SCNC: 0 NMOL/L
VGKC AB SER-SCNC: 0 NMOL/L
WBC # BLD AUTO: 8.63 K/UL (ref 3.9–12.7)

## 2020-09-22 PROCEDURE — 94761 N-INVAS EAR/PLS OXIMETRY MLT: CPT

## 2020-09-22 PROCEDURE — 25000003 PHARM REV CODE 250: Performed by: PHYSICIAN ASSISTANT

## 2020-09-22 PROCEDURE — 99233 PR SUBSEQUENT HOSPITAL CARE,LEVL III: ICD-10-PCS | Mod: ,,, | Performed by: PSYCHIATRY & NEUROLOGY

## 2020-09-22 PROCEDURE — 11000001 HC ACUTE MED/SURG PRIVATE ROOM

## 2020-09-22 PROCEDURE — 63600175 PHARM REV CODE 636 W HCPCS: Performed by: HOSPITALIST

## 2020-09-22 PROCEDURE — 36415 COLL VENOUS BLD VENIPUNCTURE: CPT

## 2020-09-22 PROCEDURE — A4216 STERILE WATER/SALINE, 10 ML: HCPCS | Performed by: NURSE PRACTITIONER

## 2020-09-22 PROCEDURE — 25000003 PHARM REV CODE 250: Performed by: NURSE PRACTITIONER

## 2020-09-22 PROCEDURE — 97803 MED NUTRITION INDIV SUBSEQ: CPT

## 2020-09-22 PROCEDURE — 80053 COMPREHEN METABOLIC PANEL: CPT

## 2020-09-22 PROCEDURE — 25000003 PHARM REV CODE 250: Performed by: HOSPITALIST

## 2020-09-22 PROCEDURE — 99233 SBSQ HOSP IP/OBS HIGH 50: CPT | Mod: ,,, | Performed by: PSYCHIATRY & NEUROLOGY

## 2020-09-22 PROCEDURE — 99232 PR SUBSEQUENT HOSPITAL CARE,LEVL II: ICD-10-PCS | Mod: ,,, | Performed by: HOSPITALIST

## 2020-09-22 PROCEDURE — 85025 COMPLETE CBC W/AUTO DIFF WBC: CPT

## 2020-09-22 PROCEDURE — 97530 THERAPEUTIC ACTIVITIES: CPT | Mod: CQ

## 2020-09-22 PROCEDURE — 99232 SBSQ HOSP IP/OBS MODERATE 35: CPT | Mod: ,,, | Performed by: HOSPITALIST

## 2020-09-22 PROCEDURE — 97116 GAIT TRAINING THERAPY: CPT | Mod: CQ

## 2020-09-22 RX ORDER — LEVETIRACETAM 750 MG/1
1500 TABLET ORAL 2 TIMES DAILY
Status: DISCONTINUED | OUTPATIENT
Start: 2020-09-22 | End: 2020-09-22

## 2020-09-22 RX ORDER — TALC
6 POWDER (GRAM) TOPICAL NIGHTLY
Status: DISCONTINUED | OUTPATIENT
Start: 2020-09-22 | End: 2020-09-24 | Stop reason: HOSPADM

## 2020-09-22 RX ORDER — LEVETIRACETAM 750 MG/1
1500 TABLET ORAL 2 TIMES DAILY
Status: DISCONTINUED | OUTPATIENT
Start: 2020-09-22 | End: 2020-09-23

## 2020-09-22 RX ORDER — CLOBAZAM 10 MG/1
20 TABLET ORAL 2 TIMES DAILY
Status: DISCONTINUED | OUTPATIENT
Start: 2020-09-22 | End: 2020-09-24 | Stop reason: HOSPADM

## 2020-09-22 RX ORDER — LEVETIRACETAM 750 MG/1
3000 TABLET ORAL ONCE
Status: DISCONTINUED | OUTPATIENT
Start: 2020-09-22 | End: 2020-09-22

## 2020-09-22 RX ADMIN — Medication 3 ML: at 02:09

## 2020-09-22 RX ADMIN — CLOBAZAM 20 MG: 10 TABLET ORAL at 08:09

## 2020-09-22 RX ADMIN — ALPRAZOLAM 0.25 MG: 0.25 TABLET ORAL at 08:09

## 2020-09-22 RX ADMIN — MELATONIN TAB 3 MG 6 MG: 3 TAB at 08:09

## 2020-09-22 RX ADMIN — Medication 3 ML: at 09:09

## 2020-09-22 RX ADMIN — CLOBAZAM 20 MG: 10 TABLET ORAL at 09:09

## 2020-09-22 RX ADMIN — LEVETIRACETAM 3000 MG: 100 INJECTION, SOLUTION, CONCENTRATE INTRAVENOUS at 01:09

## 2020-09-22 RX ADMIN — LACOSAMIDE 200 MG: 50 TABLET, FILM COATED ORAL at 10:09

## 2020-09-22 RX ADMIN — LACOSAMIDE 200 MG: 50 TABLET, FILM COATED ORAL at 11:09

## 2020-09-22 RX ADMIN — SIMETHICONE CHEW TAB 80 MG 80 MG: 80 TABLET ORAL at 08:09

## 2020-09-22 RX ADMIN — LEVETIRACETAM 1500 MG: 750 TABLET ORAL at 08:09

## 2020-09-22 RX ADMIN — VENLAFAXINE HYDROCHLORIDE 150 MG: 150 CAPSULE, EXTENDED RELEASE ORAL at 09:09

## 2020-09-22 RX ADMIN — ENOXAPARIN SODIUM 40 MG: 40 INJECTION SUBCUTANEOUS at 05:09
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
## 2020-09-22 NOTE — PLAN OF CARE
Recommendations    Recommendation:   1. Continue regular diet, encourage good PO intake at meals    - If PO <50% of meals, add boost plus to increase intake   2. RD to monitor and follow up    Goals: Meet % EEN, EPN by RD f/u date  Nutrition Goal Status: goal met  Communication of RD Recs: other (comment)(POC)

## 2020-09-22 NOTE — SUBJECTIVE & OBJECTIVE
"  Family History     Problem Relation (Age of Onset)    Epilepsy Mother    No Known Problems Father        Tobacco Use    Smoking status: Current Every Day Smoker     Types: Cigarettes   Substance and Sexual Activity    Alcohol use: Yes    Drug use: Never    Sexual activity: Not Currently     Psychotherapeutics (From admission, onward)    Start     Stop Route Frequency Ordered    09/21/20 2100  QUEtiapine tablet 100 mg      -- Oral Nightly 09/21/20 1527    09/21/20 1045  venlafaxine 24 hr capsule 150 mg      -- Oral Daily 09/21/20 0934    09/20/20 1115  ALPRAZolam tablet 0.25 mg      -- Oral 2 times daily PRN 09/20/20 1016    09/13/20 1826  lorazepam injection 2 mg      -- IV Every hour PRN 09/13/20 1726           Review of Systems  Objective:     Vital Signs (Most Recent):  Temp: 97.9 °F (36.6 °C) (09/22/20 0832)  Pulse: 89 (09/22/20 0832)  Resp: 18 (09/22/20 0832)  BP: 129/71 (09/22/20 0832)  SpO2: 96 % (09/22/20 0832) Vital Signs (24h Range):  Temp:  [96 °F (35.6 °C)-97.9 °F (36.6 °C)] 97.9 °F (36.6 °C)  Pulse:  [86-99] 89  Resp:  [16-20] 18  SpO2:  [95 %-98 %] 96 %  BP: (107-131)/(58-79) 129/71     Height: 5' 2" (157.5 cm)  Weight: 74.9 kg (165 lb 2 oz)  Body mass index is 30.2 kg/m².      Intake/Output Summary (Last 24 hours) at 9/22/2020 1128  Last data filed at 9/21/2020 2100  Gross per 24 hour   Intake 120 ml   Output 500 ml   Net -380 ml       Physical Exam  Psychiatric:      Comments:   Mental Status Exam:  Appearance: age appropriate, uncombed hair, wearing hospital gown, lying in bed, breakfast crumbs noted on sheets of bed  Behavior/Cooperation: cooperative, calm, polite pleasant, polite, engaged, no psychomotor agitation or retardation, good eye contact  Speech: normal tone, normal rate, normal volume  Mood: "good"  Affect: mood congruent, appropriate, euthymic  Thought Process: normal and logical  Thought Content: normal, no suicidality, no homicidality, delusions, or paranoia   Orientation: " person, place, situation, day of week, month of year, year  Memory: Intact, Registers 3/3 and recalls 3/3 objects at 5 minutes  Attention Span/Concentration: Normal  Insight: good  Judgment: fair            Significant Labs: All pertinent labs within the past 24 hours have been reviewed.    Significant Imaging: I have reviewed all pertinent imaging results/findings within the past 24 hours.

## 2020-09-22 NOTE — PROGRESS NOTES
"[facility]  Adult Nutrition  Progress Note    SUMMARY       Recommendations    Recommendation:   1. Continue regular diet, encourage good PO intake at meals    - If PO <50% of meals, add boost plus to increase intake   2. RD to monitor and follow up    Goals: Meet % EEN, EPN by RD f/u date  Nutrition Goal Status: goal met  Communication of RD Recs: other (comment)(POC)    Reason for Assessment    Reason For Assessment: RD follow-up  Diagnosis: other (see comments)(Seizure disorder)  Relevant Medical History: HTN, Depression  Interdisciplinary Rounds: did not attend  General Information Comments: Pt reported good PO intake at this time, % of meals and states she is hungry. Pt requesting extra drinks with each meal. Wt stable since admit. No recent wt loss reported PTA. NFPE completed on 9/17 pt with no s/s of malnutrition.  Nutrition Discharge Planning: adequate intake via PO diet    Nutrition Risk Screen    Nutrition Risk Screen: no indicators present    Nutrition/Diet History    Spiritual, Cultural Beliefs, Sikh Practices, Values that Affect Care: no  Food Allergies: NKFA  Factors Affecting Nutritional Intake: None identified at this time    Anthropometrics    Temp: 97.9 °F (36.6 °C)  Height Method: Stated  Height: 5' 2" (157.5 cm)  Height (inches): 62 in  Weight Method: Bed Scale  Weight: 74.9 kg (165 lb 2 oz)  Weight (lb): 165.13 lb  Ideal Body Weight (IBW), Female: 110 lb  % Ideal Body Weight, Female (lb): 150.12 %  BMI (Calculated): 30.2  BMI Grade: 30 - 34.9- obesity - grade I    Lab/Procedures/Meds    Pertinent Labs Reviewed: reviewed  Pertinent Labs Comments: WNL  Pertinent Medications Reviewed: reviewed    Estimated/Assessed Needs    Weight Used For Calorie Calculations: 75 kg (165 lb 5.5 oz)  Energy Calorie Requirements (kcal): 1591 kcal/d  Energy Need Method: [place](1.25 PAL)  Protein Requirements: 75-90 g/d (1-1.2 g/kg)  Weight Used For Protein Calculations: " 75 kg (165 lb 5.5 oz)  Estimated Fluid Requirement Method: other (see comments)(Per MD or 1 mL/kcal)  RDA Method (mL): 1591    Nutrition Prescription Ordered    Current Diet Order: Regular diet    Evaluation of Received Nutrient/Fluid Intake    I/O: -5.9 L since admit  Energy Calories Required: meeting needs  Protein Required: meeting needs  Fluid Required: meeting needs  Comments: LBM 9/19  Tolerance: tolerating  % Intake of Estimated Energy Needs: 75 - 100 %  % Meal Intake: 75 - 100 %    Nutrition Risk    Level of Risk/Frequency of Follow-up: low     Assessment and Plan  Nutrition Problem  Inadequate energy intake     Related to (etiology):   Inability to consume sufficient energy     Signs and Symptoms (as evidenced by):   NPO     Interventions(treatment strategy):  Collaboration of nutrition care w/ other providers  Enteral nutrition      Nutrition Diagnosis Status:   Resolved     Monitor and Evaluation    Food and Nutrient Intake: energy intake, food and beverage intake  Food and Nutrient Adminstration: diet order  Knowledge/Beliefs/Attitudes: food and nutrition knowledge/skill  Physical Activity and Function: nutrition-related ADLs and IADLs  Anthropometric Measurements: weight, weight change  Biochemical Data, Medical Tests and Procedures: electrolyte and renal panel, gastrointestinal profile, glucose/endocrine profile, inflammatory profile, lipid profile  Nutrition-Focused Physical Findings: overall appearance     Malnutrition Assessment  Orbital Region (Subcutaneous Fat Loss): mild depletion  Upper Arm Region (Subcutaneous Fat Loss): well nourished   Scientologist Region (Muscle Loss): well nourished  Clavicle Bone Region (Muscle Loss): well nourished  Clavicle and Acromion Bone Region (Muscle Loss): well nourished  Dorsal Hand (Muscle Loss): mild depletion  Anterior Thigh Region (Muscle Loss): well nourished  Posterior Calf Region (Muscle Loss): well nourished     Nutrition Follow-Up    RD Follow-up?: Yes

## 2020-09-22 NOTE — PLAN OF CARE
09/22/20 1010   Post-Acute Status   Post-Acute Authorization Placement   Post-Acute Placement Status Referrals Sent       Referrals sent to:    1. [facility]: Sent referral  2. [facility]: Declined  3. [facility]: Declined  4. [facility]: declined  5. [facility]:  reviewing      Awaiting acceptance.  SW in contact with CM and Medical staff. Will continue to follow and offer support as needed.     Ravindra Bailey, GOME[facility]   Ext. [phone]
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
## 2020-09-22 NOTE — ASSESSMENT & PLAN NOTE
"- Patient reports intermittent hallucinations prior to current hospitalization, "seeing a person in the next room"  - Follow up CSF results, infectious workup. No further noted hallucinations noted, possibly related to seizures  - Psychiatry following for assistance  "

## 2020-09-22 NOTE — PLAN OF CARE
Problem: Fall Injury Risk  Goal: Absence of Fall and Fall-Related Injury  Outcome: Ongoing, Progressing     Problem: Seizure, Active Management  Goal: Absence of Seizure/Seizure-Related Injury  Outcome: Ongoing, Progressing      Plan of care reviewed with patient. Education provided. Patient verbalizes understanding. Please see flowsheets for full assessment and vitals. Seizure and fall precautions maintained.Bed in lowest position. Call bell placed within reach. [facility]

## 2020-09-22 NOTE — ASSESSMENT & PLAN NOTE
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    Estimated Blood Loss: Less than 5 ml.    Complications: None noted    Specimens Removed: no     "ASSESSMENT      Donna Arias is a 60 y.o. female with a past psychiatric history of Depression and Anxiety, currently presenting with New onset seizure with abnormal neurological exam without head trauma.  Psychiatry was originally consulted to address the patient's symptoms of memory problems, anxiety, and visual hallucinations.  Patient continues to have witnessed seizure activity with patient reporting post-ictal confusion at times.  She has good insight.  Reports being prescribed xanax by her PCP, for anxiety, but moreso for insomnia, per patient.  She denies having hallucinations for "the past week."       IMPRESSION  Psychological factors from a general medical condition     RECOMMENDATION(S)       1. Scheduled Medication(s):  - would recommend discontinuing seroquel 100 mg nightly, which can lower the seizure threshold, in addition to discontinuing effexor  mg daily.  She does not currently meet criteria for MDD or STEPHY.  Would recommend discontinuing above medications to reduce polypharmacy.     2. PRN Medication(s):  - continue xanax 0.5 mg BID PRN for anxiety, as she is compliant at home.     3.  Monitor:  Please obtain daily EKG to monitor QTc; siezure, eeg     4. Legal Status/Precaution(s):  Does not meet criteria     5. Other:  CAM ICU- negative  DELIRIUM BEHAVIOR MANAGEMENT  · PLEASE utilize CHEMICAL restraints with PRN meds first for agitation. Minimize use of PHYSICAL restraints  · Keep window shades open and room lit during day and room dim at night in order to promote normal sleep-wake cycles  · Encourage family at bedside. [place] patient often to situation, location, date.  · Continue to Limit or Discontinue use of Narcotics, Benzos and Anti-cholinergic medications as they may worsen delirium.  · Continue medical workup for causative etiology of Delirium.     Thank you for having psychiatry participate in the care of this patient.  We will sign off.  Please contact us or call us should any " other concerns arise.
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
## 2020-09-22 NOTE — PT/OT/SLP PROGRESS
"Physical Therapy Treatment    Patient Name:  Donna Arias   MRN:  [id number]    Recommendations:     Discharge Recommendations:  rehabilitation facility   Discharge Equipment Recommendations: (TBD)   Barriers to discharge: Inaccessible home and Decreased caregiver support    Assessment:     Donna Arias is a 60 y.o. female admitted with a medical diagnosis of Seizure disorder.  She presents with the following impairments/functional limitations:  weakness, impaired endurance, impaired self care skills, impaired functional mobilty, gait instability, impaired balance, decreased safety awareness, impaired cognition, impaired coordination.    Rehab Prognosis: Good; patient would benefit from acute skilled PT services to address these deficits and reach maximum level of function.    Recent Surgery: * No surgery found *      Plan:     During this hospitalization, patient to be seen 4 x/week to address the identified rehab impairments via gait training, therapeutic activities, therapeutic exercises, neuromuscular re-education and progress toward the following goals:    · Plan of Care Expires:  10/14/20    Subjective     Chief Complaint: desire to return home  Patient/Family Comments/goals: "I'm very anxious to get back home. They seem to have done all they can for me here."  Pain/Comfort:  · Pain Rating 1: 0/10  · Pain Rating Post-Intervention 1: 0/10      Objective:     Communicated with NSG prior to session.  Patient found HOB elevated with PureWick upon PTA entry to room.     General Precautions: Standard, fall, seizure   Orthopedic Precautions:N/A   Braces: N/A     Functional Mobility:  · Bed Mobility:     · Supine to Sit: stand by assistance  · Transfers:     · Sit to Stand:  contact guard assistance with no AD and hand-held assist  · Gait: Pt ambulates ~86 ft with L HHA and Min A. Pt with step to/stop and go lenard. Pt with extremely slow gait speed, decreased B step length, and narrow DENNYS. Pt with lateral instability " throughout gait trial. 3 LOBs noted. 2 laterally and 1 posteriorly.       AM-PAC 6 CLICK MOBILITY  Turning over in bed (including adjusting bedclothes, sheets and blankets)?: 3  Sitting down on and standing up from a chair with arms (e.g., wheelchair, bedside commode, etc.): 3  Moving from lying on back to sitting on the side of the bed?: 3  Moving to and from a bed to a chair (including a wheelchair)?: 3  Need to walk in hospital room?: 3  Climbing 3-5 steps with a railing?: 2  Basic Mobility Total Score: 17       Therapeutic Activities and Exercises:   Pt assisted with functional mobility as noted above.   Pt performs sit<>stand x 3 trials with no AD and CGA. Pt requires cues for hand placement and sequencing.   Pt stands statically x 2 trials with CGA/Min A for balance with postural sway noted. Pt cued on widened DENNYS but pt unsteady and with minor LOB requiring Min A while attempting adjust DENNYS.   Pt educated on assistance needed and calling for assistance.     Patient left up in chair with BLE reclined with all lines intact and call button in reach.    GOALS:   Multidisciplinary Problems     Physical Therapy Goals        Problem: Physical Therapy Goal    Goal Priority Disciplines Outcome Goal Variances Interventions   Physical Therapy Goal     PT, PT/OT Ongoing, Progressing     Description: Goals to be met by: 2020    Patient will increase functional independence with mobility by performin. Pt will perform bed mobility (rolling L/R, scooting, and bridging) with Nikole. - goal met 2020   Updated: modified independent  2. Pt will perform supine to/from sit with Nikole. - goal met 2020   Updated: modified independent  3. Pt will sit EOB x 15 mins with B UE support with min assistance.  4. Pt will perform sit to stand with min assistance. - goal met 2020   Updated: modified independent  5. Pt will ambulate 50 feet with min assistance.  6. Pt will perform there-ex from handout x 15 reps to  improve strength for functional mobility.  7. Pt will ascend/descend 2 steps with 1 HR and Nikole.                   Time Tracking:     PT Received On: 09/22/20  PT Start Time: 0955     PT Stop Time: 1019  PT Total Time (min): 24 min     Billable Minutes: Gait Training 14 and Therapeutic Activity 10    Treatment Type: Treatment  PT/PTA: PTA     PTA Visit Number: 1     Mateo Kruger, PTA  09/22/2020
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
## 2020-09-22 NOTE — SUBJECTIVE & OBJECTIVE
Interval History: Onfi PA denied, plan to restart Keppra in order to ultimately discontinue. No need for EEG at this point.    Current Facility-Administered Medications   Medication Dose Route Frequency Provider Last Rate Last Dose    acetaminophen tablet 500 mg  500 mg Oral Q4H PRN Beverley Phillips MD        ALPRAZolam tablet 0.25 mg  0.25 mg Oral BID PRN Beverley Phillips MD   0.25 mg at 09/21/20 0911    calcium carbonate 200 mg calcium (500 mg) chewable tablet 500 mg  500 mg Oral Daily PRN Beverley Phillips MD   500 mg at 09/19/20 1630    enoxaparin injection 40 mg  40 mg Subcutaneous Q24H Beverley Phillips MD   40 mg at 09/21/20 1716    labetalol 20 mg/4 mL (5 mg/mL) IV syring  10 mg Intravenous Q4H PRN Andres Clements NP        lacosamide tablet 200 mg  200 mg Oral Q12H Nancy Coelho NP   200 mg at 09/22/20 1059    levETIRAcetam (KEPPRA) 3,000 mg in dextrose 5 % 250 mL IVPB  3,000 mg Intravenous Once Beverley Phillips  mL/hr at 09/22/20 1340 3,000 mg at 09/22/20 1340    levETIRAcetam tablet 1,500 mg  1,500 mg Oral BID Beverley Phillips MD        lorazepam injection 2 mg  2 mg Intravenous Q1H PRN Gregory Ortega MD        ondansetron injection 4 mg  4 mg Intravenous Q6H PRN Andres Clements NP   4 mg at 09/16/20 1928    polyethylene glycol packet 17 g  17 g Oral BID PRN Beverley Phillips MD        QUEtiapine tablet 100 mg  100 mg Oral QHS Beverley Phillips MD   100 mg at 09/21/20 2153    simethicone chewable tablet 80 mg  1 tablet Per NG tube TID PRN Cyn Elizalde PA-C   80 mg at 09/19/20 2010    sodium chloride 0.9% flush 10 mL  10 mL Intravenous PRN Ingrid Hughes MD   10 mL at 09/19/20 0119    sodium chloride 0.9% flush 3 mL  3 mL Intravenous Q8H Andres Clements, AMADEO 0 mL/hr at 09/13/20 0924 3 mL at 09/22/20 1400    venlafaxine 24 hr capsule 150 mg  150 mg Oral Daily Beverley Phillips MD   150 mg at 09/22/20 0911     Continuous Infusions:    Review of Systems    Constitutional: Negative for chills and fever.   HENT: Negative for rhinorrhea and sore throat.    Eyes: Negative for photophobia and redness.   Respiratory: Negative for cough and shortness of breath.    Cardiovascular: Negative for chest pain and leg swelling.   Gastrointestinal: Negative for nausea and vomiting.   Musculoskeletal: Positive for neck pain. Negative for neck stiffness.   Skin: Negative for pallor.   Neurological: Positive for seizures. Negative for dizziness, speech difficulty, weakness and headaches.   Psychiatric/Behavioral: Negative for agitation and hallucinations. The patient is not hyperactive.      Objective:     Vital Signs (Most Recent):  Temp: 98.5 °F (36.9 °C) (09/22/20 1208)  Pulse: 94 (09/22/20 1208)  Resp: 18 (09/22/20 1208)  BP: 119/67 (09/22/20 1208)  SpO2: 98 % (09/22/20 1208) Vital Signs (24h Range):  Temp:  [96 °F (35.6 °C)-98.5 °F (36.9 °C)] 98.5 °F (36.9 °C)  Pulse:  [86-99] 94  Resp:  [16-20] 18  SpO2:  [95 %-98 %] 98 %  BP: (107-131)/(58-79) 119/67     Weight: 74.9 kg (165 lb 2 oz)  Body mass index is 30.2 kg/m².    Physical Exam  Vitals signs and nursing note reviewed.   Constitutional:       General: She is not in acute distress.     Appearance: Normal appearance. She is normal weight. She is not diaphoretic.   HENT:      Head: Normocephalic and atraumatic.   Eyes:      Extraocular Movements: Extraocular movements intact.      Conjunctiva/sclera: Conjunctivae normal.      Pupils: Pupils are equal, round, and reactive to light.   Neck:      Musculoskeletal: Normal range of motion and neck supple. No neck rigidity.   Cardiovascular:      Pulses: Normal pulses.   Pulmonary:      Effort: Pulmonary effort is normal. No respiratory distress.   Musculoskeletal:         General: No swelling or deformity.   Skin:     General: Skin is warm and dry.   Neurological:      Mental Status: She is oriented to person, place, and time.   Psychiatric:         Speech: Speech normal.          NEUROLOGICAL EXAMINATION:     MENTAL STATUS   Oriented to person, place, and time.   Speech: speech is normal   Level of consciousness: alert       Alert, oriented to person, place, date. Interactive, participates in exam. Some tangential speech.     CRANIAL NERVES     CN III, IV, VI   Pupils are equal, round, and reactive to light.    CN VII   Facial expression full, symmetric.     CN VIII   CN VIII normal.     CN XI   CN XI normal.     MOTOR EXAM   Muscle bulk: normal  Overall muscle tone: normal       Moves all extremities spontaneously and symmetrically, follows commands        Significant Labs: All pertinent lab results from the past 24 hours have been reviewed.    Significant Studies: I have reviewed all pertinent imaging results/findings within the past 24 hours.

## 2020-09-22 NOTE — PLAN OF CARE
Patient rec is Rehab.  Patient with multiple denials in her home area.  Referrals out in [facility].   09/22/20 1447   Discharge Reassessment   Assessment Type Discharge Planning Reassessment   Provided patient/caregiver education on the expected discharge date and the discharge plan Yes   Do you have any problems affording any of your prescribed medications? No   Discharge Plan A Rehab   Discharge Plan B Home Health   DME Needed Upon Discharge  none   Patient choice form signed by patient/caregiver N/A   Anticipated Discharge Disposition Rehab   Can the patient/caregiver answer the patient profile reliably? Yes, cognitively intact   Describe the patient's ability to walk at the present time. Major restrictions/daily assistance from another person   Post-Acute Status   Post-Acute Authorization Placement   Post-Acute Placement Status Referrals Sent   Discharge Delays None known at this time

## 2020-09-23 PROBLEM — F32.9 MAJOR DEPRESSIVE DISORDER: Status: ACTIVE | Noted: 2020-09-14

## 2020-09-23 PROCEDURE — 25000003 PHARM REV CODE 250: Performed by: PHYSICIAN ASSISTANT

## 2020-09-23 PROCEDURE — 25000003 PHARM REV CODE 250: Performed by: HOSPITALIST

## 2020-09-23 PROCEDURE — 25000003 PHARM REV CODE 250: Performed by: NURSE PRACTITIONER

## 2020-09-23 PROCEDURE — 99222 PR INITIAL HOSPITAL CARE,LEVL II: ICD-10-PCS | Mod: ,,, | Performed by: NURSE PRACTITIONER

## 2020-09-23 PROCEDURE — 99233 PR SUBSEQUENT HOSPITAL CARE,LEVL III: ICD-10-PCS | Mod: 95,,, | Performed by: INTERNAL MEDICINE

## 2020-09-23 PROCEDURE — 25000003 PHARM REV CODE 250: Performed by: INTERNAL MEDICINE

## 2020-09-23 PROCEDURE — 63600175 PHARM REV CODE 636 W HCPCS: Performed by: HOSPITALIST

## 2020-09-23 PROCEDURE — 99222 1ST HOSP IP/OBS MODERATE 55: CPT | Mod: ,,, | Performed by: NURSE PRACTITIONER

## 2020-09-23 PROCEDURE — 97535 SELF CARE MNGMENT TRAINING: CPT

## 2020-09-23 PROCEDURE — 11000001 HC ACUTE MED/SURG PRIVATE ROOM

## 2020-09-23 PROCEDURE — 94761 N-INVAS EAR/PLS OXIMETRY MLT: CPT

## 2020-09-23 PROCEDURE — A4216 STERILE WATER/SALINE, 10 ML: HCPCS | Performed by: NURSE PRACTITIONER

## 2020-09-23 PROCEDURE — 99233 SBSQ HOSP IP/OBS HIGH 50: CPT | Mod: 95,,, | Performed by: INTERNAL MEDICINE

## 2020-09-23 RX ORDER — VENLAFAXINE HYDROCHLORIDE 150 MG/1
150 CAPSULE, EXTENDED RELEASE ORAL DAILY
Status: DISCONTINUED | OUTPATIENT
Start: 2020-09-24 | End: 2020-09-24 | Stop reason: HOSPADM

## 2020-09-23 RX ORDER — LEVETIRACETAM 500 MG/1
500 TABLET ORAL 2 TIMES DAILY
Status: DISCONTINUED | OUTPATIENT
Start: 2020-09-24 | End: 2020-09-24

## 2020-09-23 RX ORDER — LEVETIRACETAM 500 MG/1
1000 TABLET ORAL 2 TIMES DAILY
Status: COMPLETED | OUTPATIENT
Start: 2020-09-23 | End: 2020-09-24

## 2020-09-23 RX ADMIN — SIMETHICONE CHEW TAB 80 MG 80 MG: 80 TABLET ORAL at 07:09

## 2020-09-23 RX ADMIN — ALPRAZOLAM 0.25 MG: 0.25 TABLET ORAL at 08:09

## 2020-09-23 RX ADMIN — Medication 3 ML: at 02:09

## 2020-09-23 RX ADMIN — CLOBAZAM 20 MG: 10 TABLET ORAL at 07:09

## 2020-09-23 RX ADMIN — CLOBAZAM 20 MG: 10 TABLET ORAL at 08:09

## 2020-09-23 RX ADMIN — LEVETIRACETAM 1500 MG: 750 TABLET ORAL at 07:09

## 2020-09-23 RX ADMIN — Medication 3 ML: at 10:09

## 2020-09-23 RX ADMIN — Medication 3 ML: at 06:09

## 2020-09-23 RX ADMIN — ENOXAPARIN SODIUM 40 MG: 40 INJECTION SUBCUTANEOUS at 05:09

## 2020-09-23 RX ADMIN — LEVETIRACETAM 1000 MG: 500 TABLET ORAL at 08:09

## 2020-09-23 RX ADMIN — MELATONIN TAB 3 MG 6 MG: 3 TAB at 08:09

## 2020-09-23 RX ADMIN — LACOSAMIDE 200 MG: 50 TABLET, FILM COATED ORAL at 10:09

## 2020-09-23 RX ADMIN — POLYETHYLENE GLYCOL 3350 17 G: 17 POWDER, FOR SOLUTION ORAL at 09:09

## 2020-09-23 NOTE — HOSPITAL COURSE
9/21/20: Participated w/ PT. Bed mobility min- modA. Ambulated 20 ft Nikole.   9/23/20: Participated w/ OT. Bed mobility CGA- SBA. Sit to stand CGA. Grooming & UBD SBA. LBD Nikole. Toileting CGA.

## 2020-09-23 NOTE — CONSULTS
[facility] Hospital Medicine  Telemedicine Consult Note    Patient Name: Donna Arias  MRN: [id number]  Admission Date: 9/11/2020  Hospital Length of Stay: 11 days  Attending Physician: Beverley Phillips MD   Primary Care Provider: Casey Richmond MD         Donna Arias has been accepted for transfer to [facility] and will be followed through telemedicine services beginning 09/23/20 at 7 AM.          Tiffanie Lauren MD  Department of Hospital Medicine   [facility]

## 2020-09-23 NOTE — ASSESSMENT & PLAN NOTE
- EEG 9/15-9/16 appears to be last seizure activity  - LP completed 9/13, HSV PCR negative- acyclovir stopped.

## 2020-09-23 NOTE — ASSESSMENT & PLAN NOTE
- EEG 9/15-9/16 appears to be last seizure activity.   - Now on Vimpat and Keppra.  - MRI Brain 9/17 revealed Interval resolution of the cortical diffusion restriction present throughout the right frontal lobe, now exhibiting edema like signal in the cortex and subjacent subcortical white matter with mild residual mass effect.

## 2020-09-23 NOTE — CONSULTS
Inpatient consult to Physical Medicine Rehab  Consult performed by: Shelley Gallegos NP  Consult ordered by: Beverley Phillips MD  Reason for consult: Assess rehab needs      Reviewed patient history and current admission.  Rehab team following.  Full consult to follow.    XIAO Valladares, FNP-C  Physical Medicine & Rehabilitation   09/23/2020  Spectralink: [id number]

## 2020-09-23 NOTE — PT/OT/SLP PROGRESS
"Occupational Therapy   Treatment    Name: Donna Arias  MRN: [id number]  Admitting Diagnosis:  Status epilepticus       Recommendations:     Discharge Recommendations: rehabilitation facility  Discharge Equipment Recommendations:  (TBD)  Barriers to discharge:  Decreased caregiver support    Assessment:     Donna Arias is a 60 y.o. female with a medical diagnosis of Status epilepticus.  She presents with the following performance deficits affecting function are weakness, impaired endurance, impaired self care skills, impaired functional mobilty, gait instability, impaired balance, decreased safety awareness, impaired coordination, impaired cognition, pain.     Patient participated well with therapy, required stand by to minimal assist for bed mobility, functional transfers, and completion of ADL's. Patient presenting as highly distractible and with impaired safety awareness; continues to require v/t cues for sequencing, attention to task, and safely navigating room and bathroom. Patient provided reorientation and cues for safety as well as education regarding discharging planning as patient expressing concerns and desire to return home. Patient will continue to benefit from skilled OT services during this admission and placement in rehabilitation facility to address deficits listed above and maximize functional independence prior to retuning home.     Rehab Prognosis:  Good; patient would benefit from acute skilled OT services to address these deficits and reach maximum level of function.       Plan:     Patient to be seen 4 x/week to address the above listed problems via self-care/home management, therapeutic activities, therapeutic exercises, cognitive retraining, neuromuscular re-education  · Plan of Care Expires: 10/13/20  · Plan of Care Reviewed with: patient    Subjective   "Ok well, I feel like I can get enough therapy here to go home..."    Pain/Comfort:  · Pain Rating 1: 7/10  · Location - Orientation 1: " generalized  · Location 1: neck  · Pain Addressed 1: Distraction, Reposition, Nurse notified  · Pain Rating Post-Intervention 1: 0/10    Objective:     Communicated with: Nursing prior to session.  Patient found supine with PureWick upon OT entry to room.    General Precautions: Standard, fall, seizure   Orthopedic Precautions:N/A   Braces: N/A     Occupational Performance:     Bed Mobility:    · Patient completed Rolling/Turning to Right with contact guard assistance  · Patient completed Scooting/Bridging with contact guard assistance  · Patient completed Supine to Sit with stand by assistance  · Patient completed Sit to Supine with stand by assistance     Functional Mobility/Transfers:  · Patient completed Sit <> Stand Transfer with contact guard assistance  with  hand-held assist   · Patient completed Toilet Transfer Step Transfer technique with contact guard assistance with  no AD  · Functional Mobility: Contact guard with hand held assistance x 4 bouts within room, required cues to initiate standing rest breaks   · Small DENNYS, cues for upright posture and scanning environment  · Household distance x 2 completed    Activities of Daily Living:  · Grooming: stand by assistance standing at sink with alternating UE support  · Upper Body Dressing: stand by assistance seated edge of bed  · Lower Body Dressing: minimum assistance seated edge of bed with increased time and min cues for sequencing  · Toileting: contact guard assistance for for hygiene, cues to scan environment for necessary items    [facility] 6 Click ADL: 18    Treatment & Education:  -Patient completed ADL's and transfers as outlined above, required consistent cueing for attention to multi-step tasks, problem solving, and navigating environment safely during functional mobility.  -Patient and family educated on roles/goals of OT and POC.  -White board updated.  -Therapist provided time for questions and answered within scope of practice.  -Patient educated  on importance of EOB/OOB activity to maximize recovery.    Patient left supine with all lines intact, call button in reach and nursing notifiedEducation:      GOALS:   Multidisciplinary Problems     Occupational Therapy Goals        Problem: Occupational Therapy Goal    Goal Priority Disciplines Outcome Interventions   Occupational Therapy Goal     OT, PT/OT Ongoing, Progressing    Description: Goals to be met by: 9/28/2020     Patient will increase functional independence with ADLs by performing:    Grooming while EOB with Minimal Assistance.  Toileting from bedside commode with Minimal Assistance for hygiene and clothing management.   Sitting at edge of bed x10 minutes with Contact Guard Assistance while engaging in functional tasks   Stand pivot transfers with Minimal Assistance.  Toilet transfer to bedside commode with Minimal Assistance.                     Time Tracking:     OT Date of Treatment: 09/23/20  OT Start Time: 0844  OT Stop Time: 0907  OT Total Time (min): 23 min    Billable Minutes:Self Care/Home Management 13  Therapeutic Activity 10    Misty Hearn OT  9/23/2020

## 2020-09-23 NOTE — SUBJECTIVE & OBJECTIVE
Past Medical History:   Diagnosis Date    Anxiety     Depression     Hypertension      History reviewed. No pertinent surgical history.  Review of patient's allergies indicates:   Allergen Reactions    Benadryl [diphenhydramine hcl]     Penicillins Other (See Comments)     unknown    Prednisone        Scheduled Medications:    cloBAZam  20 mg Oral BID    enoxaparin  40 mg Subcutaneous Q24H    lacosamide  200 mg Oral Q12H    levETIRAcetam  1,500 mg Oral BID    melatonin  6 mg Oral Nightly    sodium chloride 0.9%  3 mL Intravenous Q8H       PRN Medications: acetaminophen, ALPRAZolam, calcium carbonate, labetalol, lorazepam, ondansetron, polyethylene glycol, simethicone, sodium chloride 0.9%    Family History     Problem Relation (Age of Onset)    Epilepsy Mother    No Known Problems Father        Tobacco Use    Smoking status: Current Every Day Smoker     Types: Cigarettes   Substance and Sexual Activity    Alcohol use: Yes    Drug use: Never    Sexual activity: Not Currently     Review of Systems   Constitutional: Positive for activity change. Negative for fatigue and fever.   HENT: Negative for sore throat and trouble swallowing.    Eyes: Negative for visual disturbance.   Respiratory: Negative for cough and shortness of breath.    Cardiovascular: Negative for chest pain and leg swelling.   Gastrointestinal: Negative for abdominal distention and abdominal pain.   Genitourinary: Negative for difficulty urinating.   Musculoskeletal: Positive for gait problem. Negative for back pain.   Skin: Negative for color change.   Neurological: Positive for seizures and weakness. Negative for dizziness, light-headedness and headaches.   Psychiatric/Behavioral: Negative for agitation and confusion.     Objective:     Vital Signs (Most Recent):  Temp: 97 °F (36.1 °C) (09/23/20 1106)  Pulse: 83 (09/23/20 1106)  Resp: 18 (09/23/20 1106)  BP: 119/65 (09/23/20 1106)  SpO2: 98 % (09/23/20 1106)    Vital Signs (24h  Range):  Temp:  [97 °F (36.1 °C)-98.9 °F (37.2 °C)] 97 °F (36.1 °C)  Pulse:  [74-94] 83  Resp:  [17-18] 18  SpO2:  [93 %-100 %] 98 %  BP: (108-120)/(54-72) 119/65     Body mass index is 30.2 kg/m².    Physical Exam  Vitals signs and nursing note reviewed.   Constitutional:       Appearance: Normal appearance. She is well-developed.   HENT:      Head: Normocephalic and atraumatic.      Nose: Nose normal.      Mouth/Throat:      Mouth: Mucous membranes are moist.   Eyes:      Pupils: Pupils are equal, round, and reactive to light.   Neck:      Musculoskeletal: Normal range of motion and neck supple.   Pulmonary:      Effort: Pulmonary effort is normal. No respiratory distress.   Musculoskeletal:      Comments: RUE: 5/5.  LUE: 5/5.  RLE: 5/5.  LLE: 5/5.   Skin:     General: Skin is warm and dry.   Neurological:      Mental Status: She is alert.      Comments: Following commands     Psychiatric:      Comments: Calm at bs       NEUROLOGICAL EXAMINATION:     CRANIAL NERVES     CN III, IV, VI   Pupils are equal, round, and reactive to light.      Diagnostic Results:   Labs: Reviewed  ECG: Reviewed  MRI: Reviewed

## 2020-09-23 NOTE — CONSULTS
[facility]  Physical Medicine & Rehab  Consult Note    Patient Name: Donna Arias  MRN: [id number]  Admission Date: 9/11/2020  Hospital Length of Stay: 12 days  Attending Physician: Tiffanie Lauren MD     Inpatient consult to Physical Medicine & Rehabilitation  Consult performed by: Shelley Gallegos NP  Consult requested by:  Tiffanie Lauren MD    Collaborating Physician: Joanne Lott MD  Reason for Consult:  Assess rehabilitation needs     Consults  Subjective:     Principal Problem: Status epilepticus    HPI: Donna Arias is a 60-year-old female with PMHx of Depression, HTN, Anxiety, HTN, and PEC on 9/11/20 at OSH for hallucinations and had seizure activity. [place] [facility] [place] activity. LP completed 9/13, HSV PCR negative- acyclovir stopped. EEG 9/15-9/16 appears to be last seizure activity. Now on Vimpat and Keppra. MRI Brain 9/17 revealed Interval resolution of the cortical diffusion restriction present throughout the right frontal lobe, now exhibiting edema like signal in the cortex and subjacent subcortical white matter with mild residual mass effect. Hospital course complicated by hallucinations (now resolved, Psych managing d/c'd Seroquel and continued Xanax PRN).     Functional History: Patient lives alone in [facility] with 1-2 steps to enter and has support from brother and father per patient. Prior to admission, I. DME: none.    Hospital Course: 9/21/20: Participated w/ PT. Bed mobility min- modA. Ambulated 20 ft Nikole.   9/23/20: Participated w/ OT. Bed mobility CGA- SBA. Sit to stand CGA. Grooming & UBD SBA. LBD Nikole. Toileting CGA.     Past Medical History:   Diagnosis Date    Anxiety     Depression     Hypertension      History reviewed. No pertinent surgical history.  Review of patient's allergies indicates:   Allergen Reactions    Benadryl [diphenhydramine hcl]     Penicillins Other (See Comments)     unknown    Prednisone        Scheduled Medications:     cloBAZam  20 mg Oral BID    enoxaparin  40 mg Subcutaneous Q24H    lacosamide  200 mg Oral Q12H    levETIRAcetam  1,500 mg Oral BID    melatonin  6 mg Oral Nightly    sodium chloride 0.9%  3 mL Intravenous Q8H       PRN Medications: acetaminophen, ALPRAZolam, calcium carbonate, labetalol, lorazepam, ondansetron, polyethylene glycol, simethicone, sodium chloride 0.9%    Family History     Problem Relation (Age of Onset)    Epilepsy Mother    No Known Problems Father        Tobacco Use    Smoking status: Current Every Day Smoker     Types: Cigarettes   Substance and Sexual Activity    Alcohol use: Yes    Drug use: Never    Sexual activity: Not Currently     Review of Systems   Constitutional: Positive for activity change. Negative for fatigue and fever.   HENT: Negative for sore throat and trouble swallowing.    Eyes: Negative for visual disturbance.   Respiratory: Negative for cough and shortness of breath.    Cardiovascular: Negative for chest pain and leg swelling.   Gastrointestinal: Negative for abdominal distention and abdominal pain.   Genitourinary: Negative for difficulty urinating.   Musculoskeletal: Positive for gait problem. Negative for back pain.   Skin: Negative for color change.   Neurological: Positive for seizures and weakness. Negative for dizziness, light-headedness and headaches.   Psychiatric/Behavioral: Negative for agitation and confusion.     Objective:     Vital Signs (Most Recent):  Temp: 97 °F (36.1 °C) (09/23/20 1106)  Pulse: 83 (09/23/20 1106)  Resp: 18 (09/23/20 1106)  BP: 119/65 (09/23/20 1106)  SpO2: 98 % (09/23/20 1106)    Vital Signs (24h Range):  Temp:  [97 °F (36.1 °C)-98.9 °F (37.2 °C)] 97 °F (36.1 °C)  Pulse:  [74-94] 83  Resp:  [17-18] 18  SpO2:  [93 %-100 %] 98 %  BP: (108-120)/(54-72) 119/65     Body mass index is 30.2 kg/m².    Physical Exam  Vitals signs and nursing note reviewed.   Constitutional:       Appearance: Normal appearance. She is well-developed.    HENT:      Head: Normocephalic and atraumatic.      Nose: Nose normal.      Mouth/Throat:      Mouth: Mucous membranes are moist.   Eyes:      Pupils: Pupils are equal, round, and reactive to light.   Neck:      Musculoskeletal: Normal range of motion and neck supple.   Pulmonary:      Effort: Pulmonary effort is normal. No respiratory distress.   Musculoskeletal:      Comments: RUE: 5/5.  LUE: 5/5.  RLE: 5/5.  LLE: 5/5.   Skin:     General: Skin is warm and dry.   Neurological:      Mental Status: She is alert.      Comments: Following commands     Psychiatric:      Comments: Calm at bs     Diagnostic Results:   Labs: Reviewed  ECG: Reviewed  MRI: Reviewed    Assessment/Plan:     * Status epilepticus  - EEG 9/15-9/16 appears to be last seizure activity.   - Now on Vimpat and Keppra.  - MRI Brain 9/17 revealed Interval resolution of the cortical diffusion restriction present throughout the right frontal lobe, now exhibiting edema like signal in the cortex and subjacent subcortical white matter with mild residual mass effect.     Hallucinations- RESOLVED  - now resolved  - Psych managing d/c'd Seroquel and continued Xanax PRN      Cervical disc disease  - Related to prolonged/acute hospital course.     Recommendations  -  Encourage mobility, OOB in chair at least 3 hours per day, and early ambulation as appropriate  -  PT/OT evaluate and treat  -  Pain management  -  Monitor for and prevent skin breakdown and pressure ulcers  · Early mobility, repositioning/weight shifting every 20-30 minutes when sitting, turn patient every 2 hours, proper mattress/overlay and chair cushioning, pressure relief/heel protector boots  -  DVT prophylaxis    -  Reviewed discharge options (IP rehab, SNF, HH therapy, and OP therapy)    Seizure disorder  - EEG 9/15-9/16 appears to be last seizure activity  - LP completed 9/13, HSV PCR negative- acyclovir stopped.     Will follow progress with therapy.     Thank you for your consult.      Shelley Gallegos NP  Department of Physical Medicine & Rehab  [facility]

## 2020-09-23 NOTE — HPI
Donna Arias is a 60-year-old female with PMHx of Depression, HTN, Anxiety, HTN, and PEC on 9/11/20 at OSH for hallucinations and had seizure activity. [place] [facility] [place] activity. LP completed 9/13, HSV PCR negative- acyclovir stopped. EEG 9/15-9/16 appears to be last seizure activity. Now on Vimpat and Keppra. MRI Brain 9/17 revealed Interval resolution of the cortical diffusion restriction present throughout the right frontal lobe, now exhibiting edema like signal in the cortex and subjacent subcortical white matter with mild residual mass effect. Hospital course complicated by hallucinations (now resolved, Psych managing d/c'd Seroquel and continued Xanax PRN).     Functional History: Patient lives alone in [facility] with 1-2 steps to enter and has support from brother and father per patient. Prior to admission, I. DME: none.

## 2020-09-23 NOTE — CONSULTS
Inpatient consult to Physical Medicine Rehab  Consult performed by: Shelley Gallegos NP  Consult ordered by: Beverley Phillips MD  Reason for consult: Assess rehab needs      Reviewed patient history and current admission.  Rehab team following.  Full consult to follow.    XIAO Valladares, FNP-C  Physical Medicine & Rehabilitation   09/23/2020  Spectralink: [id number]

## 2020-09-24 VITALS
DIASTOLIC BLOOD PRESSURE: 62 MMHG | SYSTOLIC BLOOD PRESSURE: 105 MMHG | BODY MASS INDEX: 30.39 KG/M2 | HEIGHT: 62 IN | TEMPERATURE: 99 F | HEART RATE: 97 BPM | RESPIRATION RATE: 18 BRPM | WEIGHT: 165.13 LBS | OXYGEN SATURATION: 96 %

## 2020-09-24 LAB
ALBUMIN SERPL BCP-MCNC: 3.3 G/DL (ref 3.5–5.2)
ALP SERPL-CCNC: 74 U/L (ref 55–135)
ALT SERPL W/O P-5'-P-CCNC: 18 U/L (ref 10–44)
ANION GAP SERPL CALC-SCNC: 12 MMOL/L (ref 8–16)
AST SERPL-CCNC: 20 U/L (ref 10–40)
BASOPHILS # BLD AUTO: 0.06 K/UL (ref 0–0.2)
BASOPHILS NFR BLD: 0.9 % (ref 0–1.9)
BILIRUB SERPL-MCNC: 0.2 MG/DL (ref 0.1–1)
BUN SERPL-MCNC: 10 MG/DL (ref 6–20)
CALCIUM SERPL-MCNC: 9.8 MG/DL (ref 8.7–10.5)
CHLORIDE SERPL-SCNC: 101 MMOL/L (ref 95–110)
CO2 SERPL-SCNC: 27 MMOL/L (ref 23–29)
CREAT SERPL-MCNC: 0.7 MG/DL (ref 0.5–1.4)
DIFFERENTIAL METHOD: ABNORMAL
EOSINOPHIL # BLD AUTO: 0.3 K/UL (ref 0–0.5)
EOSINOPHIL NFR BLD: 4.1 % (ref 0–8)
ERYTHROCYTE [DISTWIDTH] IN BLOOD BY AUTOMATED COUNT: 14.9 % (ref 11.5–14.5)
EST. GFR  (AFRICAN AMERICAN): >60 ML/MIN/1.73 M^2
EST. GFR  (NON AFRICAN AMERICAN): >60 ML/MIN/1.73 M^2
GLUCOSE SERPL-MCNC: 101 MG/DL (ref 70–110)
HCT VFR BLD AUTO: 35.1 % (ref 37–48.5)
HGB BLD-MCNC: 11 G/DL (ref 12–16)
IMM GRANULOCYTES # BLD AUTO: 0.02 K/UL (ref 0–0.04)
IMM GRANULOCYTES NFR BLD AUTO: 0.3 % (ref 0–0.5)
LYMPHOCYTES # BLD AUTO: 1.9 K/UL (ref 1–4.8)
LYMPHOCYTES NFR BLD: 27.3 % (ref 18–48)
MAGNESIUM SERPL-MCNC: 1.9 MG/DL (ref 1.6–2.6)
MCH RBC QN AUTO: 29.4 PG (ref 27–31)
MCHC RBC AUTO-ENTMCNC: 31.3 G/DL (ref 32–36)
MCV RBC AUTO: 94 FL (ref 82–98)
MONOCYTES # BLD AUTO: 0.5 K/UL (ref 0.3–1)
MONOCYTES NFR BLD: 6.9 % (ref 4–15)
NEUTROPHILS # BLD AUTO: 4.2 K/UL (ref 1.8–7.7)
NEUTROPHILS NFR BLD: 60.5 % (ref 38–73)
NRBC BLD-RTO: 0 /100 WBC
PLATELET # BLD AUTO: 434 K/UL (ref 150–350)
PMV BLD AUTO: 8.5 FL (ref 9.2–12.9)
POTASSIUM SERPL-SCNC: 4 MMOL/L (ref 3.5–5.1)
PROT SERPL-MCNC: 6.7 G/DL (ref 6–8.4)
RBC # BLD AUTO: 3.74 M/UL (ref 4–5.4)
SODIUM SERPL-SCNC: 140 MMOL/L (ref 136–145)
WBC # BLD AUTO: 7 K/UL (ref 3.9–12.7)

## 2020-09-24 PROCEDURE — 25000003 PHARM REV CODE 250: Performed by: PHYSICIAN ASSISTANT

## 2020-09-24 PROCEDURE — 25000003 PHARM REV CODE 250: Performed by: INTERNAL MEDICINE

## 2020-09-24 PROCEDURE — 97116 GAIT TRAINING THERAPY: CPT

## 2020-09-24 PROCEDURE — 80053 COMPREHEN METABOLIC PANEL: CPT

## 2020-09-24 PROCEDURE — 99232 PR SUBSEQUENT HOSPITAL CARE,LEVL II: ICD-10-PCS | Mod: ,,, | Performed by: NURSE PRACTITIONER

## 2020-09-24 PROCEDURE — 85025 COMPLETE CBC W/AUTO DIFF WBC: CPT

## 2020-09-24 PROCEDURE — 99232 SBSQ HOSP IP/OBS MODERATE 35: CPT | Mod: ,,, | Performed by: NURSE PRACTITIONER

## 2020-09-24 PROCEDURE — 99239 HOSP IP/OBS DSCHRG MGMT >30: CPT | Mod: ,,, | Performed by: INTERNAL MEDICINE

## 2020-09-24 PROCEDURE — 25000003 PHARM REV CODE 250: Performed by: HOSPITALIST

## 2020-09-24 PROCEDURE — 25000003 PHARM REV CODE 250: Performed by: NURSE PRACTITIONER

## 2020-09-24 PROCEDURE — 36415 COLL VENOUS BLD VENIPUNCTURE: CPT

## 2020-09-24 PROCEDURE — 99222 PR INITIAL HOSPITAL CARE,LEVL II: ICD-10-PCS | Mod: ,,, | Performed by: PHYSICAL MEDICINE & REHABILITATION

## 2020-09-24 PROCEDURE — A4216 STERILE WATER/SALINE, 10 ML: HCPCS | Performed by: NURSE PRACTITIONER

## 2020-09-24 PROCEDURE — 99239 PR HOSPITAL DISCHARGE DAY,>30 MIN: ICD-10-PCS | Mod: ,,, | Performed by: INTERNAL MEDICINE

## 2020-09-24 PROCEDURE — 99222 1ST HOSP IP/OBS MODERATE 55: CPT | Mod: ,,, | Performed by: PHYSICAL MEDICINE & REHABILITATION

## 2020-09-24 PROCEDURE — 83735 ASSAY OF MAGNESIUM: CPT

## 2020-09-24 PROCEDURE — 97530 THERAPEUTIC ACTIVITIES: CPT

## 2020-09-24 RX ORDER — LEVETIRACETAM 750 MG/1
1500 TABLET ORAL 2 TIMES DAILY
Qty: 120 TABLET | Refills: 11
Start: 2020-09-24 | End: 2021-09-24

## 2020-09-24 RX ORDER — ENOXAPARIN SODIUM 100 MG/ML
40 INJECTION SUBCUTANEOUS DAILY
Start: 2020-09-24

## 2020-09-24 RX ORDER — DICLOFENAC SODIUM 10 MG/G
2-4 GEL TOPICAL 3 TIMES DAILY PRN
Start: 2020-09-24

## 2020-09-24 RX ORDER — POLYETHYLENE GLYCOL 3350 17 G/17G
17 POWDER, FOR SOLUTION ORAL 2 TIMES DAILY PRN
Refills: 0
Start: 2020-09-24

## 2020-09-24 RX ORDER — ALPRAZOLAM 0.25 MG/1
0.25 TABLET ORAL 2 TIMES DAILY PRN
Start: 2020-09-24 | End: 2020-10-24

## 2020-09-24 RX ORDER — SUCRALFATE 1 G/1
1 TABLET ORAL
Status: DISCONTINUED | OUTPATIENT
Start: 2020-09-24 | End: 2020-09-24 | Stop reason: HOSPADM

## 2020-09-24 RX ORDER — LEVETIRACETAM 750 MG/1
1500 TABLET ORAL 2 TIMES DAILY
Status: DISCONTINUED | OUTPATIENT
Start: 2020-09-24 | End: 2020-09-24 | Stop reason: HOSPADM

## 2020-09-24 RX ORDER — CLOBAZAM 20 MG/1
20 TABLET ORAL 2 TIMES DAILY
Qty: 60 TABLET | Refills: 5
Start: 2020-09-24 | End: 2021-03-23

## 2020-09-24 RX ORDER — TALC
6 POWDER (GRAM) TOPICAL NIGHTLY
Refills: 0 | COMMUNITY
Start: 2020-09-24

## 2020-09-24 RX ORDER — LACOSAMIDE 200 MG/1
200 TABLET ORAL EVERY 12 HOURS
Qty: 60 TABLET | Refills: 2
Start: 2020-09-24 | End: 2020-10-24

## 2020-09-24 RX ORDER — LEVETIRACETAM 500 MG/1
1000 TABLET ORAL 2 TIMES DAILY
Status: DISCONTINUED | OUTPATIENT
Start: 2020-09-24 | End: 2020-09-24

## 2020-09-24 RX ORDER — LEVETIRACETAM 500 MG/1
500 TABLET ORAL ONCE
Status: COMPLETED | OUTPATIENT
Start: 2020-09-24 | End: 2020-09-24

## 2020-09-24 RX ORDER — SUCRALFATE 1 G/1
1 TABLET ORAL
Start: 2020-09-24

## 2020-09-24 RX ORDER — LEVETIRACETAM 500 MG/1
1000 TABLET ORAL ONCE
Status: DISCONTINUED | OUTPATIENT
Start: 2020-09-24 | End: 2020-09-24

## 2020-09-24 RX ORDER — ACETAMINOPHEN 500 MG
1000 TABLET ORAL EVERY 8 HOURS PRN
Refills: 0
Start: 2020-09-24

## 2020-09-24 RX ADMIN — CLOBAZAM 20 MG: 10 TABLET ORAL at 08:09

## 2020-09-24 RX ADMIN — LEVETIRACETAM 500 MG: 500 TABLET ORAL at 12:09

## 2020-09-24 RX ADMIN — ALPRAZOLAM 0.25 MG: 0.25 TABLET ORAL at 08:09

## 2020-09-24 RX ADMIN — POLYETHYLENE GLYCOL 3350 17 G: 17 POWDER, FOR SOLUTION ORAL at 11:09

## 2020-09-24 RX ADMIN — VENLAFAXINE HYDROCHLORIDE 150 MG: 150 CAPSULE, EXTENDED RELEASE ORAL at 08:09

## 2020-09-24 RX ADMIN — Medication 3 ML: at 05:09

## 2020-09-24 RX ADMIN — SUCRALFATE 1 G: 1 TABLET ORAL at 12:09

## 2020-09-24 RX ADMIN — LACOSAMIDE 200 MG: 50 TABLET, FILM COATED ORAL at 11:09

## 2020-09-24 RX ADMIN — Medication 3 ML: at 12:09

## 2020-09-24 RX ADMIN — LEVETIRACETAM 1000 MG: 500 TABLET ORAL at 08:09

## 2020-09-24 RX ADMIN — ALPRAZOLAM 0.25 MG: 0.25 TABLET ORAL at 04:09

## 2020-09-24 RX ADMIN — SUCRALFATE 1 G: 1 TABLET ORAL at 04:09

## 2020-09-24 NOTE — PLAN OF CARE
[facility]  Facility Transfer Orders        Admit to:  Inpatient Rehab    Diagnoses:   Active Hospital Problems    Diagnosis  POA    *Status epilepticus [G40.901]  Yes    Major depressive disorder [F32.9]  Yes    Hallucinations- RESOLVED [R44.3]  Yes    Seizure disorder [G40.909]  Yes    Hypokalemia [E87.6]  Yes    Cervical disc disease [M50.90]  Yes    Severe episode of recurrent major depressive disorder, with psychotic features [F33.3]  Yes    Bilateral sciatica [M54.31, M54.32]  Yes    Abnormal CT of brain [R90.89]  Yes    Delirium [R41.0]  Yes      Resolved Hospital Problems   No resolved problems to display.     Allergies:   Review of patient's allergies indicates:   Allergen Reactions    Benadryl [diphenhydramine hcl]     Penicillins Other (See Comments)     unknown    Prednisone        Code Status: Full    Vitals: Every shift       Diet: regular diet    Activity: Activity as tolerated    Nursing Precautions: Aspiration , Fall, Seizure and Pressure ulcer prevention    Bed/Surface: Low Air Loss    Consults: PT to evaluate and treat-   times a week, OT to evaluate and treat-   times a week and Nutrition to evaluate and recommend diet    Oxygen: room air    Dialysis: Patient is not on dialysis.     Labs: cbc, cmp, mag, phos twice weekly      Pending Diagnostic Studies:     None        Miscellaneous Care:   Routine Skin for Bedridden Patients:  Apply moisture barrier cream to all    IV Access: Peripheral     Medications: Discontinue all previous medication orders, if any. See new list below.  Current Discharge Medication List      START taking these medications    Details   acetaminophen (TYLENOL) 500 MG tablet Take 2 tablets (1,000 mg total) by mouth every 8 (eight) hours as needed for Pain or Temperature greater than (100.4).  Refills: 0      cloBAZam (ONFI) 20 mg Tab Take 1 tablet (20 mg total) by mouth 2 (two) times a day. DISCONTINUE ON DISCHARGE FROM REHAB.  Qty: 60 tablet,  Refills: 5      enoxaparin (LOVENOX) 40 mg/0.4 mL Syrg Inject 0.4 mLs (40 mg total) into the skin once daily.      lacosamide (VIMPAT) 200 mg Tab tablet Take 1 tablet (200 mg total) by mouth every 12 (twelve) hours.  Qty: 60 tablet, Refills: 2      levETIRAcetam (KEPPRA) 750 MG Tab Take 2 tablets (1,500 mg total) by mouth 2 (two) times daily.  Qty: 120 tablet, Refills: 11      melatonin (MELATIN) 3 mg tablet Take 2 tablets (6 mg total) by mouth nightly.  Refills: 0      polyethylene glycol (GLYCOLAX) 17 gram PwPk Take 17 g by mouth 2 (two) times daily as needed (CONSTIPATION).  Qty:  , Refills: 0      sucralfate (CARAFATE) 1 gram tablet Take 1 tablet (1 g total) by mouth 4 (four) times daily before meals and nightly.  Qty:           CONTINUE these medications which have CHANGED    Details   ALPRAZolam (XANAX) 0.25 MG tablet Take 1 tablet (0.25 mg total) by mouth 2 (two) times daily as needed for Anxiety.  Qty:        diclofenac sodium (VOLTAREN) 1 % Gel Apply 2-4 g topically 3 (three) times daily as needed.         CONTINUE these medications which have NOT CHANGED    Details   venlafaxine 150 mg TR24 Take 150 mg by mouth once daily.         STOP taking these medications       QUEtiapine (SEROQUEL) 100 MG Tab Comments:   Reason for Stopping:             Follow up:   Follow-up Information     Casey Richmond MD.    Specialty: Family Medicine  Why: Outpatient Services  Contact information:  [address]  [place] [address]  [phone]                 Future Appointments   Date Time Provider Department Center   10/19/2020  1:00 PM Elizabeth Funes MD PhD [facility] NE[facility]O [facility]       Sara Harkins MD  Hospital Medicine [facility]  [phone]

## 2020-09-24 NOTE — PLAN OF CARE
Patient to be discharged to [facility].  Care deferred to [facility].  Patient to be transported via wheelchair van provided per [facility].  Neurosurgery clinic to schedule follow up appointment.    Future Appointments   Date Time Provider Department Center   10/19/2020  1:00 PM Elizabeth Funes MD PhD [facility] NEURO [facility]       09/24/20 1217   Final Note   Assessment Type Final Discharge Note   Anticipated Discharge Disposition Rehab   Hospital Follow Up  Appt(s) scheduled?   (CM not requested to make any follow up appointments.)   Discharge plans and expectations educations in teach back method with documentation complete? Yes   Right Care Referral Info   Post Acute Recommendation IRF   Facility Name [facility]   Post-Acute Status   Post-Acute Authorization Placement   Post-Acute Placement Status Set-up Complete   Discharge Delays None known at this time

## 2020-09-24 NOTE — DISCHARGE SUMMARY
[facility] Hospital Medicine  Discharge Summary      Patient Name: Donna Arias  MRN: [id number]  Admission Date: 9/11/2020  Hospital Length of Stay: 13 days  Discharge Date and Time: 9/24/2020  5:46 PM  Attending Physician: Sara Harkins MD   Discharging Provider: Sara Harkins MD  Primary Care Provider: Casey Richmond MD      This service was provided through telemedicine.  Patient was transferred to the telemedicine service on:   9/23/2020  The patient consents to virtual visits.   The patient location is: [address] [place] [address] A  Chief complaint:  Status epilepticus   Start time:1022a    End time:  1048a    Total time spent with patient: 26 min  Present with the patient at the time of the telemed/virtual assessment: Telemed presenter  I have assessed these findings virtually using a telemed platform and with assistance of bedside nurse.  The attending portion of this evaluation, treatment, and documentation was performed per Sara Harkins MD via audiovisual modality.      HPI:   9/11/2020: Donna Arias is a 60 year old female with medical history of The patient has medical history of depression with anxiety, sciatica, and cervical disc disease. She presented to the [facility] via EMS with report of continuous seizures that were first noted this morning at approximately 0815 this morning.  Patient unable to provide history and history gotten from [facility].  Per medical record, the patient was admitted to [facility] as PEC from  [facility] ([place], [place]) after being found with hallucinations and altered mental status.  Report states that patient sustained fall approximately September 3rd, but no other information was available regarding possible head injury.  Per [facility] ED report, EMS treated patient en route with versed and ativan.  Initial imaging in ED with CT head with hypoattenuation involving the superior right frontal lobe with involvement  of cortex and underlying white matter, but no evidence of hemorrhage.  MRI with changes involving the right frontal lobe increased signal without ACD correlation. Patient was admitted to Hospital Medicine for further management and evaluation of seizure activity.  Potassium 2.9 and was treated with IV potassium chloride 10 mEp for one dose.       After admission, General Neurology was consulted and the patient was loaded with Keppra 50 mg/kg (4gm).  Urine toxicology pending.  Case discussed with Neurocritical Care and agree with transfer to [facility] for continuous EEG monitoring in Neuro ICU.       9/12/2020Neurocritical care: Ms Arias is a 61 yo female with sig pmhx of depression with noncompliance of meds for a month, Anxiety, and HTN who presents to [facility] for a higher level of care for right CESILIA and R/O seizure. Patient also PEC on 9/11/20 at OSH for Hallucinations. Per OSH note pt had continued seizure activity occurring every 5 min and was treated with Keppra and Vimpat. Neurology attempted lumbar puncture x 2, but was unsuccessful. CT scan and MRI brain revealed increased signal in the right frontal lobe that Neurology thought might be consistent with seizure activity. Patient is currently AAO x 3 and has no neurological deficits on exam.     * No surgery found *      Hospital Course:   Status epilepticus  New onset seizure with abnormal neurological exam without head trauma  Seizure disorder  - Off vEEG, no rehook needed at this time. Low threshold to rehook if clinical events suspicious for seizure - prominent head turn to left   - MRI brain completed 9/17 showing interval resolution of the cortical diffusion restriction present throughout the right frontal lobe, now exhibiting edema like signal in the cortex and subjacent subcortical white matter with mild residual mass effect  - was on Keppra taper  - Discontinued VPA 9/16; restarted 9/18  - LP completed 9/13, Protein 167, HSV PCR negative-  "acyclovir stopped  - per Epilepsy - "Clinically, her seizures consist of a prominent sustained left head turn lasting 30 sec to several minutes.  She is able to talk through these but has no idea they are occurring.  If you see any abnormal left head turn movements, please have a low threshold to reconnect her to EEG. "  - statin and ASA 81 stopped as patient had no evidence of stroke   - had a 20 sec seizure episode on 9/20 - possible breakthrough seizure        - Vimpat 200 mg BID   - Continue Onfi 20 mg BID - PA denied for this medication, however would like to continue during admission and SNF/Rehab stay while patient is still actively recovering from status epilepticus. Can discontinue Onfi on discharge home and continue keppra and Vimpat.   -Tapering Keppra to discontinue.   - no further need for EEG, patient requires further clinical monitoring and rehab placement.   -patient to continue on Onfi, keppra and vimpat on discharge to inpatient rehab; on discharge from rehab, she should continue Keppra 1500 mg BID and vimpat 200 mg BID and Onfi should be stopped.  Keep f/u with epileptology as scheduled.      Psychological factors from a general medical condition  MDD  STEPHY  - Psych consulted on 9/22:   · CONTINUE home med of Effexor 150mg qd   · CONTINUE home med Xanax 0.25 PRN BID for anxiety and insomnia  · DISCONTINUE Seroquel due to side effect profile of lowering seizure threshold and increases risk of falls in Geriatric pts.     Hallucinations- resolved   - Patient reports intermittent hallucinations prior to current hospitalization, "seeing a person in the next room"  - Psychiatry consulted on 9/22: hallucinations which are resolved now were related to her seizure disorder and NOT due to any psychiatric condition.      Abnormal CT of brain- resolved   - MRI brain 9/12/20 showed restricted diffusion and edema throughout the right frontal lobe with sulcal effacement.  Findings concerning for acute infarction " in the right CESILIA territory  - Repeat MRI brain 9/17 showing interval resolution of the cortical diffusion restriction present throughout the right frontal lobe    Consults:   Consults (From admission, onward)        Status Ordering Provider     Inpatient consult to Midline team  Once     Provider:  (Not yet assigned)    Completed MAXIMO ELLIS     Inpatient consult to Neurology  Once     Provider:  Patria Evans MD    Completed IASEL KRAMER     Inpatient consult to Physical Medicine Rehab  Once     Provider:  (Not yet assigned)    Completed LAWRENCE BRIONES     Inpatient consult to Physical Medicine Rehab  Once     Provider:  (Not yet assigned)    Completed LAWRENCE BRIONES     Inpatient consult to Psychiatry  Once     Provider:  (Not yet assigned)    Completed TJ STEVENS PJuan Carlos     Inpatient consult to Psychiatry  Once     Provider:  (Not yet assigned)    Completed LAWRENCE BRIONES     Inpatient consult to Registered Dietitian/Nutritionist  Once     Provider:  (Not yet assigned)    Completed JD MCDANIEL     Inpatient consult to Social Work/Case Management  Once     Provider:  (Not yet assigned)    Completed TJ STEVENS PJuan Carlos     Inpatient consult to Vascular (Stroke) Neurology  Once     Provider:  (Not yet assigned)    Completed TJ STEVENS PJuan Carlos     Inpatient virtual consult to Hospital Medicine  Once     Provider:  (Not yet assigned)    Completed LAWRENCE BRIONES          Final Active Diagnoses:    Diagnosis Date Noted POA    PRINCIPAL PROBLEM:  Status epilepticus [G40.901] 09/22/2020 Yes    Impaired mobility and ADLs [Z74.09]  No    Major depressive disorder [F32.9] 09/14/2020 Yes    Hallucinations- RESOLVED [R44.3] 09/12/2020 Yes    Seizure disorder [G40.909] 09/11/2020 Yes    Hypokalemia [E87.6] 09/11/2020 Yes    Cervical disc disease [M50.90] 09/11/2020 Yes    Severe episode of recurrent major depressive disorder, with psychotic features [F33.3] 09/11/2020 Yes    Bilateral  sciatica [M54.31, M54.32] 09/11/2020 Yes    Abnormal CT of brain [R90.89]  Yes    Delirium [R41.0]  Yes      Problems Resolved During this Admission:      Discharged Condition: stable    Disposition: Rehab Facility    Follow Up:  Follow-up Information     Casey Richmond MD.    Specialty: Family Medicine  Why: Outpatient Services  Contact information:  [address]  [place] [address]  [phone]                 Patient Instructions:      Activity as tolerated     Medications:  Reconciled Home Medications:      Medication List      START taking these medications    acetaminophen 500 MG tablet  Commonly known as: TYLENOL  Take 2 tablets (1,000 mg total) by mouth every 8 (eight) hours as needed for Pain or Temperature greater than (100.4).     cloBAZam 20 mg Tab  Commonly known as: ONFI  Take 1 tablet (20 mg total) by mouth 2 (two) times a day. DISCONTINUE ON DISCHARGE FROM REHAB.     enoxaparin 40 mg/0.4 mL Syrg  Commonly known as: LOVENOX  Inject 0.4 mLs (40 mg total) into the skin once daily.     lacosamide 200 mg Tab tablet  Commonly known as: VIMPAT  Take 1 tablet (200 mg total) by mouth every 12 (twelve) hours.     levETIRAcetam 750 MG Tab  Commonly known as: KEPPRA  Take 2 tablets (1,500 mg total) by mouth 2 (two) times daily.     melatonin 3 mg tablet  Commonly known as: MELATIN  Take 2 tablets (6 mg total) by mouth nightly.     polyethylene glycol 17 gram Pwpk  Commonly known as: GLYCOLAX  Take 17 g by mouth 2 (two) times daily as needed (CONSTIPATION).     sucralfate 1 gram tablet  Commonly known as: CARAFATE  Take 1 tablet (1 g total) by mouth 4 (four) times daily before meals and nightly.        CHANGE how you take these medications    ALPRAZolam 0.25 MG tablet  Commonly known as: XANAX  Take 1 tablet (0.25 mg total) by mouth 2 (two) times daily as needed for Anxiety.  What changed:   · medication strength  · how much to take  · when to take this  · reasons to take this      diclofenac sodium 1 % Gel  Commonly known as: VOLTAREN  Apply 2-4 g topically 3 (three) times daily as needed.  What changed:   · when to take this  · reasons to take this        CONTINUE taking these medications    venlafaxine 150 mg Tr24  Take 150 mg by mouth once daily.        STOP taking these medications    SEROqueL 100 MG Tab  Generic drug: QUEtiapine            Significant Diagnostic Studies: as above    Pending Diagnostic Studies:     None        Indwelling Lines/Drains at time of discharge:   Lines/Drains/Airways     Drain            Female External Urinary Catheter 09/13/20 1700 10 days                Time spent on the discharge of patient: 40 minutes  Patient was seen and examined on the date of discharge and determined to be suitable for discharge.         Sara Harkins MD  Department of Hospital Medicine  [facility]

## 2020-09-24 NOTE — NURSING
Patient discharged to [facility]. Report given to facility. Patient stable. Family notified. No issues at time of entry.

## 2020-09-24 NOTE — ASSESSMENT & PLAN NOTE
-EEG 9/15-9/16 appears to be last seizure activity, no further EEG required  -LP completed 9/13, HSV PCR negative- acyclovir stopped  -on clobazam and vimpat, tapering keppra to discontinue

## 2020-09-24 NOTE — PLAN OF CARE
09/24/20 1436   Post-Acute Status   Post-Acute Authorization Placement   Post-Acute Placement Status Set-up Complete       Pt has been accepted by [facility]. Nurse can call report to [phone] at 5pm.  Transport setup with a W/C for 5:30. SW in contact with CM and Medical staff. Will continue to follow and offer support as needed.     Ravindra Bailey, GOMEZ  [facility]   Ext. [phone]

## 2020-09-24 NOTE — PLAN OF CARE
Problem: Adult Inpatient Plan of Care  Goal: Plan of Care Review  Outcome: Ongoing, Progressing   Plan of care reviewed with pt. Pt voiced understanding. Pt AAOx4. Pt denies any c/o during the shift. No apparent distress noted. Fall precautions maintained. Bed in lowest position, and locked. Call light within reach and advised to call for assistance. Side rails x 2 and slip resistant socks on at this time. [facility].

## 2020-09-24 NOTE — PT/OT/SLP PROGRESS
Physical Therapy Treatment    Patient Name:  Donna Arias   MRN:  [id number]  Admitting Diagnosis: Status epilepticus  Recent Surgery: * No surgery found *      Recommendations:     Discharge Recommendations:  rehabilitation facility   Discharge Equipment Recommendations: (TBD based on progress with mobility)   Barriers to discharge: Inaccessible home and Decreased caregiver support    Assessment:     Donna Arias is a 60 y.o. female admitted with a medical diagnosis of Status epilepticus. Patient tolerated PT treatment well today. She most limited today by weakness and decreased endurance.  She was able to stand, transfer, and ambulate in the room with minimal assistance. She participated well, but demonstrated repetition of certain phrases and information throughout the session. Focus of treatment was improving functional mobility. Pt is progressing well. See detailed treatment note below:    Problem List: weakness, impaired endurance, impaired self care skills, impaired functional mobilty, gait instability, impaired balance, decreased upper extremity function, decreased lower extremity function, decreased safety awareness. weakness, impaired endurance, impaired self care skills, impaired functional mobilty, gait instability, impaired balance, decreased upper extremity function, decreased lower extremity function, decreased safety awareness  Rehab Prognosis: Good     GOALS:   Multidisciplinary Problems     Physical Therapy Goals        Problem: Physical Therapy Goal    Goal Priority Disciplines Outcome Goal Variances Interventions   Physical Therapy Goal     PT, PT/OT Ongoing, Progressing     Description: Goals to be met by: 2020    Patient will increase functional independence with mobility by performin. Pt will perform bed mobility (rolling L/R, scooting, and bridging) with Nikole. - goal met 2020   Updated: modified independent  2. Pt will perform supine to/from sit with Nikole. - goal met  "9/16/2020   Updated: modified independent  3. Pt will sit EOB x 15 mins with B UE support with min assistance.  4. Pt will perform sit to stand with min assistance. - goal met 9/16/2020   Updated: modified independent  5. Pt will ambulate 50 feet with min assistance.  6. Pt will perform there-ex from handout x 15 reps to improve strength for functional mobility.  7. Pt will ascend/descend 2 steps with 1 HR and Nikole.                 Plan:     During this hospitalization, patient to be seen 4 x/week to address the listed problems via gait training, therapeutic activities, therapeutic exercises, neuromuscular re-education  · Plan of Care Expires:  10/14/20   Plan of Care Reviewed with: patient    Subjective     Communicated with RN prior to session.  Patient found seated in bed upon PT entry to room.   "I've been in this bed for 10 days"    Pain/Comfort:  · Pain Rating 1: 0/10  · Pain Rating Post-Intervention 1: 0/10    Objective:     Patient found with: CYDNEY Duenas   Mental Status: Patient is Alert and Cooperative during session.    General Precautions: Standard, fall, seizure   Orthopedic Precautions:N/A   Braces: N/A   Respiratory Status: room air  Vital Signs (Most Recent):    Temp: 98.8 °F (37.1 °C) (09/24/20 1528)  Pulse: 97 (09/24/20 1528)  Resp: 18 (09/24/20 1528)  BP: 105/62 (09/24/20 1528)  SpO2: 96 % (09/24/20 1528)    Functional Mobility:  Bed Mobility:   · Supine to Sit: minimum assistance  · Scooting anteriorly to EOB to have both feet planted on floor: stand by assistance    Sitting Balance at Edge of Bed:  · Assistance Level Required: standy by assistance    Transfers:   · Sit <> Stand Transfer: contact guard assistance with no assistive device   · Bed <> Chair Transfer: Stand Pivot technique with minimum assistance with no assistive device      Gait:  · Patient ambulated: 45 feet   · Patient required: minimal assist  · Patient used:  No Assistive Device  · Gait Deviation(s): decreased speed, lateral " swaying    Education:  Patient was educated on the following:   Progress of PT goals and plan of care   In room safety and use of call button   Importance of continued upright mobility and exercise   Benefits of inpatient rehab    Patient left up in chair with all lines intact, call button in reach, and RN notified.    AM-PAC 6 CLICK MOBILITY  Turning over in bed (including adjusting bedclothes, sheets and blankets)?: 3  Sitting down on and standing up from a chair with arms (e.g., wheelchair, bedside commode, etc.): 3  Moving from lying on back to sitting on the side of the bed?: 3  Moving to and from a bed to a chair (including a wheelchair)?: 3  Need to walk in hospital room?: 3  Climbing 3-5 steps with a railing?: 2  Basic Mobility Total Score: 17       Time Tracking:     PT Received On: 09/24/20  PT Start Time: 1420     PT Stop Time: 1443  PT Total Time (min): 23 min   Billable Minutes:   · Gait Training 13 and Therapeutic Activity 10    Treatment Type: Treatment  PT/PTA: PT       Letciia Mehta PT, DPT  09/24/2020

## 2020-09-24 NOTE — SUBJECTIVE & OBJECTIVE
Interval History 9/24/2020:  Patient is seen for follow-up PM&R evaluation and recommendations: Hallucinations resolved.  Participating with therapy.  Really wants to go home.    HPI, Past Medical, Family, and Social History remains the same as documented in the initial encounter.    Scheduled Medications:    cloBAZam  20 mg Oral BID    enoxaparin  40 mg Subcutaneous Q24H    lacosamide  200 mg Oral Q12H    levETIRAcetam  1,500 mg Oral BID    levETIRAcetam  500 mg Oral Once    melatonin  6 mg Oral Nightly    sodium chloride 0.9%  3 mL Intravenous Q8H    venlafaxine  150 mg Oral Daily       Diagnostic Results: Labs: Reviewed    PRN Medications: acetaminophen, ALPRAZolam, calcium carbonate, labetalol, lorazepam, ondansetron, polyethylene glycol, simethicone, sodium chloride 0.9%    Review of Systems   Constitutional: Positive for activity change. Negative for chills and fatigue.   HENT: Negative for trouble swallowing and voice change.    Eyes: Negative for pain and visual disturbance.   Respiratory: Negative for cough and shortness of breath.    Cardiovascular: Negative for chest pain and palpitations.   Gastrointestinal: Negative for abdominal distention, nausea and vomiting.   Genitourinary: Negative for difficulty urinating and flank pain.   Musculoskeletal: Positive for gait problem. Negative for back pain and neck pain.   Skin: Negative for rash and wound.   Neurological: Positive for weakness. Negative for dizziness, numbness and headaches.   Psychiatric/Behavioral: Negative for agitation and hallucinations. The patient is not nervous/anxious.      Objective:     Vital Signs (Most Recent):  Temp: 98.2 °F (36.8 °C) (09/24/20 1127)  Pulse: 87 (09/24/20 1127)  Resp: 18 (09/24/20 1127)  BP: (!) 119/57 (09/24/20 1127)  SpO2: 98 % (09/24/20 1127)    Vital Signs (24h Range):  Temp:  [97.7 °F (36.5 °C)-100.1 °F (37.8 °C)] 98.2 °F (36.8 °C)  Pulse:  [84-88] 87  Resp:  [18] 18  SpO2:  [96 %-98 %] 98 %  BP:  (115-136)/(57-80) 119/57     Physical Exam  Vitals signs reviewed.   Constitutional:       General: She is not in acute distress.     Appearance: She is well-developed.   HENT:      Head: Normocephalic and atraumatic.      Right Ear: External ear normal.      Left Ear: External ear normal.      Nose: Nose normal.   Eyes:      General: No scleral icterus.        Right eye: No discharge.         Left eye: No discharge.   Neck:      Musculoskeletal: Normal range of motion.   Cardiovascular:      Rate and Rhythm: Normal rate.   Pulmonary:      Effort: Pulmonary effort is normal. No respiratory distress.   Abdominal:      General: There is no distension.      Palpations: Abdomen is soft.      Tenderness: There is no abdominal tenderness.   Musculoskeletal: Normal range of motion.         General: No tenderness.   Skin:     General: Skin is warm and dry.      Findings: No rash.   Neurological:      Mental Status: She is alert.      Motor: Weakness present.   Psychiatric:         Mood and Affect: Mood normal.         Behavior: Behavior normal.

## 2020-09-24 NOTE — PROGRESS NOTES
[facility]  Physical Medicine & Rehab  Progress Note    Patient Name: Donna Arias  MRN: [id number]  Admission Date: 9/11/2020  Length of Stay: 13 days  Attending Physician: Sara Harkins MD    Subjective:     Principal Problem:Status epilepticus    Hospital Course:   9/21/20: Participated w/ PT. Bed mobility min- modA. Ambulated 20 ft Nikole.   9/23/20: Participated w/ OT. Bed mobility CGA- SBA. Sit to stand CGA. Grooming & UBD SBA. LBD Nikole. Toileting CGA.     Interval History 9/24/2020:  Patient is seen for follow-up PM&R evaluation and recommendations: Hallucinations resolved.  Participating with therapy.  Really wants to go home.    HPI, Past Medical, Family, and Social History remains the same as documented in the initial encounter.    Scheduled Medications:    cloBAZam  20 mg Oral BID    enoxaparin  40 mg Subcutaneous Q24H    lacosamide  200 mg Oral Q12H    levETIRAcetam  1,500 mg Oral BID    levETIRAcetam  500 mg Oral Once    melatonin  6 mg Oral Nightly    sodium chloride 0.9%  3 mL Intravenous Q8H    venlafaxine  150 mg Oral Daily       Diagnostic Results: Labs: Reviewed    PRN Medications: acetaminophen, ALPRAZolam, calcium carbonate, labetalol, lorazepam, ondansetron, polyethylene glycol, simethicone, sodium chloride 0.9%    Review of Systems   Constitutional: Positive for activity change. Negative for chills and fatigue.   HENT: Negative for trouble swallowing and voice change.    Eyes: Negative for pain and visual disturbance.   Respiratory: Negative for cough and shortness of breath.    Cardiovascular: Negative for chest pain and palpitations.   Gastrointestinal: Positive for abdominal distention, negative for nausea and vomiting.   Genitourinary: Negative for difficulty urinating and flank pain.   Musculoskeletal: Positive for gait problem. Negative for back pain and neck pain.   Skin: Negative for rash and wound.   Neurological: Positive for weakness. Negative for  dizziness, numbness and headaches.   Psychiatric/Behavioral: Negative for agitation and hallucinations. The patient is not nervous/anxious.      Objective:     Vital Signs (Most Recent):  Temp: 98.2 °F (36.8 °C) (09/24/20 1127)  Pulse: 87 (09/24/20 1127)  Resp: 18 (09/24/20 1127)  BP: (!) 119/57 (09/24/20 1127)  SpO2: 98 % (09/24/20 1127)    Vital Signs (24h Range):  Temp:  [97.7 °F (36.5 °C)-100.1 °F (37.8 °C)] 98.2 °F (36.8 °C)  Pulse:  [84-88] 87  Resp:  [18] 18  SpO2:  [96 %-98 %] 98 %  BP: (115-136)/(57-80) 119/57     Physical Exam  Vitals signs reviewed.   Constitutional:       General: She is not in acute distress.     Appearance: She is well-developed.   HENT:      Head: Normocephalic and atraumatic.      Right Ear: External ear normal.      Left Ear: External ear normal.      Nose: Nose normal.   Eyes:      General: No scleral icterus.        Right eye: No discharge.         Left eye: No discharge.   Neck:      Musculoskeletal: Normal range of motion.   Cardiovascular:      Rate and Rhythm: Normal rate.   Pulmonary:      Effort: Pulmonary effort is normal. No respiratory distress.   Abdominal:      General: There is distension.      Palpations: Abdomen is soft.      Tenderness: There is no abdominal tenderness.   Musculoskeletal: Normal range of motion.         General: No tenderness.   Skin:     General: Skin is warm and dry.      Findings: No rash.   Neurological:      Mental Status: She is alert.      Motor: Weakness present.   Psychiatric:         Mood and Affect: Mood normal.         Behavior: Behavior normal.       Assessment/Plan:      * Status epilepticus  Seizure disorder  -MRI Brain 9/17 revealed Interval resolution of the cortical diffusion restriction present throughout the right frontal lobe, now exhibiting edema like signal in the cortex and subjacent subcortical white matter with mild residual mass effect  -EEG 9/15-9/16 appears to be last seizure activity, no further EEG required  -LP  completed 9/13, HSV PCR negative- acyclovir stopped  -on clobazam and vimpat, tapering keppra to discontinue     Hallucinations- RESOLVED  -now resolved  -Psych managing--> discontinued Seroquel, continued Xanax PRN    Impaired mobility and ADLs  - Related to prolonged/acute hospital course  See hospital course for functional, cognitive/speech/language, and nutrition/swallow status.    Recommendations  -  Monitor sleep disturbances and establish consistent sleep-wake cycle  -  Environmental modifications to limit agitation/confusion   -  Avoid restraints, may benefit from 24/7 supervision  -  Avoid/limit medications that can worsen delirium  -  Encourage mobility, OOB in chair, and early ambulation as appropriate  -  PT, OT, SLP evaluate and treat  -  Monitor for bowel and bladder dysfunction  -  Pain management   -  DVT prophylaxis if appropriate  -  Monitor for and prevent skin breakdown and pressure ulcers    Post-acute recommendation: Inpatient Rehab    OZ Al  Department of Physical Medicine & Rehab   [facility]

## 2022-03-10 ENCOUNTER — HISTORICAL (OUTPATIENT)
Dept: RADIOLOGY | Facility: HOSPITAL | Age: 62
End: 2022-03-10

## 2022-03-10 ENCOUNTER — HISTORICAL (OUTPATIENT)
Dept: ADMINISTRATIVE | Facility: HOSPITAL | Age: 62
End: 2022-03-10

## 2022-04-10 ENCOUNTER — HISTORICAL (OUTPATIENT)
Dept: ADMINISTRATIVE | Facility: HOSPITAL | Age: 62
End: 2022-04-10
Payer: COMMERCIAL

## 2022-04-26 VITALS
HEIGHT: 62 IN | SYSTOLIC BLOOD PRESSURE: 117 MMHG | DIASTOLIC BLOOD PRESSURE: 80 MMHG | WEIGHT: 158.75 LBS | BODY MASS INDEX: 29.21 KG/M2

## 2022-06-02 DIAGNOSIS — Z00.00 WELLNESS EXAMINATION: Primary | ICD-10-CM

## 2023-04-04 NOTE — ED NOTES
NP Paluche at bedside during seizure activity. Pt seizure lasted 19 seconds. Will continue to monitor.   Expiration Date (Optional): 06/2024

## 2023-05-03 ENCOUNTER — HOSPITAL ENCOUNTER (OUTPATIENT)
Dept: RADIOLOGY | Facility: HOSPITAL | Age: 63
Discharge: HOME | End: 2023-05-03
Attending: STUDENT IN AN ORGANIZED HEALTH CARE EDUCATION/TRAINING PROGRAM
Payer: COMMERCIAL

## 2023-05-03 DIAGNOSIS — Z12.31 ENCOUNTER FOR SCREENING MAMMOGRAM FOR BREAST CANCER: ICD-10-CM

## 2023-05-03 PROCEDURE — 77067 MAMMO DIGITAL SCREENING BILAT WITH TOMO: ICD-10-PCS | Mod: 26,,, | Performed by: RADIOLOGY

## 2023-05-03 PROCEDURE — 77067 SCR MAMMO BI INCL CAD: CPT | Mod: 26,,, | Performed by: RADIOLOGY

## 2023-05-03 PROCEDURE — 77067 SCR MAMMO BI INCL CAD: CPT | Mod: TC

## 2023-05-03 PROCEDURE — 77063 PR MAMMO SCREEN DIGITAL BREAST TOMOSYNTHESIS BIL: ICD-10-PCS | Mod: 26,,, | Performed by: RADIOLOGY

## 2023-05-03 PROCEDURE — 77063 BREAST TOMOSYNTHESIS BI: CPT | Mod: 26,,, | Performed by: RADIOLOGY
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
## 2024-10-21 ENCOUNTER — HOSPITAL ENCOUNTER (OUTPATIENT)
Dept: RADIOLOGY | Facility: HOSPITAL | Age: 64
Discharge: HOME | End: 2024-10-21
Attending: STUDENT IN AN ORGANIZED HEALTH CARE EDUCATION/TRAINING PROGRAM
Payer: MEDICARE

## 2024-10-21 DIAGNOSIS — Z12.31 ENCOUNTER FOR SCREENING MAMMOGRAM FOR BREAST CANCER: ICD-10-CM

## 2024-10-21 PROCEDURE — 77067 SCR MAMMO BI INCL CAD: CPT | Mod: TC

## 2024-10-21 PROCEDURE — 77067 SCR MAMMO BI INCL CAD: CPT | Mod: 26,,, | Performed by: RADIOLOGY

## 2024-10-21 PROCEDURE — 77063 BREAST TOMOSYNTHESIS BI: CPT | Mod: 26,,, | Performed by: RADIOLOGY

## 2024-10-21 PROCEDURE — 77063 BREAST TOMOSYNTHESIS BI: CPT | Mod: TC
